# Patient Record
Sex: FEMALE | Race: WHITE | NOT HISPANIC OR LATINO | Employment: OTHER | URBAN - METROPOLITAN AREA
[De-identification: names, ages, dates, MRNs, and addresses within clinical notes are randomized per-mention and may not be internally consistent; named-entity substitution may affect disease eponyms.]

---

## 2022-09-01 ENCOUNTER — MEDICAL CORRESPONDENCE (OUTPATIENT)
Dept: HEALTH INFORMATION MANAGEMENT | Facility: CLINIC | Age: 82
End: 2022-09-01

## 2022-10-28 ENCOUNTER — HOSPITAL ENCOUNTER (EMERGENCY)
Facility: CLINIC | Age: 82
Discharge: NURSING, REHAB OR LONG-TERM CARE | End: 2022-10-28
Attending: EMERGENCY MEDICINE | Admitting: EMERGENCY MEDICINE
Payer: COMMERCIAL

## 2022-10-28 VITALS
OXYGEN SATURATION: 98 % | HEART RATE: 73 BPM | RESPIRATION RATE: 19 BRPM | DIASTOLIC BLOOD PRESSURE: 84 MMHG | TEMPERATURE: 98.8 F | SYSTOLIC BLOOD PRESSURE: 136 MMHG

## 2022-10-28 DIAGNOSIS — F02.818 DEMENTIA DUE TO MEDICAL CONDITION WITH BEHAVIORAL DISTURBANCE (H): ICD-10-CM

## 2022-10-28 DIAGNOSIS — R41.82 ALTERED MENTAL STATUS, UNSPECIFIED ALTERED MENTAL STATUS TYPE: ICD-10-CM

## 2022-10-28 LAB
ALBUMIN SERPL BCG-MCNC: 3.6 G/DL (ref 3.5–5.2)
ALBUMIN UR-MCNC: 30 MG/DL
ALP SERPL-CCNC: 78 U/L (ref 35–104)
ALT SERPL W P-5'-P-CCNC: 28 U/L (ref 10–35)
ANION GAP SERPL CALCULATED.3IONS-SCNC: 10 MMOL/L (ref 7–15)
APPEARANCE UR: CLEAR
AST SERPL W P-5'-P-CCNC: 27 U/L (ref 10–35)
BASOPHILS # BLD AUTO: 0 10E3/UL (ref 0–0.2)
BASOPHILS NFR BLD AUTO: 0 %
BILIRUB SERPL-MCNC: 0.4 MG/DL
BILIRUB UR QL STRIP: NEGATIVE
BUN SERPL-MCNC: 28 MG/DL (ref 8–23)
CALCIUM SERPL-MCNC: 10.4 MG/DL (ref 8.8–10.2)
CHLORIDE SERPL-SCNC: 107 MMOL/L (ref 98–107)
COLOR UR AUTO: YELLOW
CREAT SERPL-MCNC: 1 MG/DL (ref 0.51–0.95)
DEPRECATED HCO3 PLAS-SCNC: 24 MMOL/L (ref 22–29)
EOSINOPHIL # BLD AUTO: 0 10E3/UL (ref 0–0.7)
EOSINOPHIL NFR BLD AUTO: 0 %
ERYTHROCYTE [DISTWIDTH] IN BLOOD BY AUTOMATED COUNT: 12.5 % (ref 10–15)
GFR SERPL CREATININE-BSD FRML MDRD: 56 ML/MIN/1.73M2
GLUCOSE SERPL-MCNC: 112 MG/DL (ref 70–99)
GLUCOSE UR STRIP-MCNC: NEGATIVE MG/DL
HCT VFR BLD AUTO: 36.6 % (ref 35–47)
HGB BLD-MCNC: 12.1 G/DL (ref 11.7–15.7)
HGB UR QL STRIP: NEGATIVE
IMM GRANULOCYTES # BLD: 0 10E3/UL
IMM GRANULOCYTES NFR BLD: 0 %
KETONES UR STRIP-MCNC: ABNORMAL MG/DL
LEUKOCYTE ESTERASE UR QL STRIP: ABNORMAL
LYMPHOCYTES # BLD AUTO: 0.9 10E3/UL (ref 0.8–5.3)
LYMPHOCYTES NFR BLD AUTO: 7 %
MCH RBC QN AUTO: 29.4 PG (ref 26.5–33)
MCHC RBC AUTO-ENTMCNC: 33.1 G/DL (ref 31.5–36.5)
MCV RBC AUTO: 89 FL (ref 78–100)
MONOCYTES # BLD AUTO: 1.7 10E3/UL (ref 0–1.3)
MONOCYTES NFR BLD AUTO: 13 %
MUCOUS THREADS #/AREA URNS LPF: PRESENT /LPF
NEUTROPHILS # BLD AUTO: 10 10E3/UL (ref 1.6–8.3)
NEUTROPHILS NFR BLD AUTO: 80 %
NITRATE UR QL: NEGATIVE
NRBC # BLD AUTO: 0 10E3/UL
NRBC BLD AUTO-RTO: 0 /100
PH UR STRIP: 5 [PH] (ref 5–7)
PLATELET # BLD AUTO: 149 10E3/UL (ref 150–450)
POTASSIUM SERPL-SCNC: 4.5 MMOL/L (ref 3.4–5.3)
PROT SERPL-MCNC: 6.2 G/DL (ref 6.4–8.3)
RBC # BLD AUTO: 4.11 10E6/UL (ref 3.8–5.2)
RBC URINE: 2 /HPF
SODIUM SERPL-SCNC: 141 MMOL/L (ref 136–145)
SP GR UR STRIP: 1.03 (ref 1–1.03)
TROPONIN T SERPL HS-MCNC: 26 NG/L
UROBILINOGEN UR STRIP-MCNC: NORMAL MG/DL
WBC # BLD AUTO: 12.7 10E3/UL (ref 4–11)
WBC URINE: 5 /HPF

## 2022-10-28 PROCEDURE — 84484 ASSAY OF TROPONIN QUANT: CPT | Performed by: EMERGENCY MEDICINE

## 2022-10-28 PROCEDURE — 96361 HYDRATE IV INFUSION ADD-ON: CPT | Performed by: EMERGENCY MEDICINE

## 2022-10-28 PROCEDURE — 80053 COMPREHEN METABOLIC PANEL: CPT | Performed by: EMERGENCY MEDICINE

## 2022-10-28 PROCEDURE — 258N000003 HC RX IP 258 OP 636: Performed by: EMERGENCY MEDICINE

## 2022-10-28 PROCEDURE — 36415 COLL VENOUS BLD VENIPUNCTURE: CPT | Performed by: EMERGENCY MEDICINE

## 2022-10-28 PROCEDURE — 87086 URINE CULTURE/COLONY COUNT: CPT | Performed by: EMERGENCY MEDICINE

## 2022-10-28 PROCEDURE — 85025 COMPLETE CBC W/AUTO DIFF WBC: CPT | Performed by: EMERGENCY MEDICINE

## 2022-10-28 PROCEDURE — 99283 EMERGENCY DEPT VISIT LOW MDM: CPT | Performed by: EMERGENCY MEDICINE

## 2022-10-28 PROCEDURE — 99283 EMERGENCY DEPT VISIT LOW MDM: CPT | Mod: 25 | Performed by: EMERGENCY MEDICINE

## 2022-10-28 PROCEDURE — 96360 HYDRATION IV INFUSION INIT: CPT | Performed by: EMERGENCY MEDICINE

## 2022-10-28 PROCEDURE — 81001 URINALYSIS AUTO W/SCOPE: CPT | Performed by: EMERGENCY MEDICINE

## 2022-10-28 RX ADMIN — SODIUM CHLORIDE 500 ML: 9 INJECTION, SOLUTION INTRAVENOUS at 10:16

## 2022-10-28 ASSESSMENT — ACTIVITIES OF DAILY LIVING (ADL)
ADLS_ACUITY_SCORE: 35

## 2022-10-28 NOTE — ED NOTES
Received a call from LPN at care facility asking for a status update.  Informed him staff will call when have more information.  Callback number [phone].  Primary RN updated.
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
## 2022-10-28 NOTE — ED NOTES
Bed: [facility]  Expected date: 10/28/22  Expected time: 9:34 AM  Means of arrival: Ambulance  Comments:  Sue [id number]    83 y/o Female    Increased confusion    Triaged:  YELLOW

## 2022-10-28 NOTE — DISCHARGE INSTRUCTIONS
Push fluids and eat well  Await urine culture results  Primary care in 1 week  Return if worsening fevers, vomiting, discomfort.

## 2022-10-28 NOTE — ED TRIAGE NOTES
Kim was BIBA today from [facility] for evaluation of increased confusion.  She is mildly confused at baseline however the aid noticed this morning that she was taking off her clothes and repeating statements.  Patient is alert and calm.  She voiced no complaints at this time.       Triage Assessment     Row Name 10/28/22 0948       Triage Assessment (Adult)    Airway WDL WDL       Respiratory WDL    Respiratory WDL WDL       Skin Circulation/Temperature WDL    Skin Circulation/Temperature WDL WDL       Cardiac WDL    Cardiac WDL WDL       Peripheral/Neurovascular WDL    Peripheral Neurovascular WDL WDL       Cognitive/Neuro/Behavioral WDL    Cognitive/Neuro/Behavioral WDL X    Level of Consciousness confused    Orientation disoriented to;place;time       Abdoulaye Coma Scale    Best Eye Response 4-->(E4) spontaneous    Best Verbal Response 4-->(V4) confused

## 2022-10-30 LAB — BACTERIA UR CULT: NORMAL

## 2022-10-31 NOTE — RESULT ENCOUNTER NOTE
Final urine culture report is negative.  Adult Negative Urine culture parameters per protocol: Any # Urogenital single or mixed organism, <10,000 col/ml single organism (cath/midstream), and > 3 organisms (No susceptibilities performed).  [facility] Emergency Dept discharge antibiotic prescribed (If applicable): None  Treatment recommendations per [facility] ED Lab Result Urine Culture protocol.

## 2023-03-11 ENCOUNTER — APPOINTMENT (OUTPATIENT)
Dept: CT IMAGING | Facility: CLINIC | Age: 83
End: 2023-03-11
Attending: EMERGENCY MEDICINE
Payer: COMMERCIAL

## 2023-03-11 ENCOUNTER — APPOINTMENT (OUTPATIENT)
Dept: GENERAL RADIOLOGY | Facility: CLINIC | Age: 83
End: 2023-03-11
Attending: EMERGENCY MEDICINE
Payer: COMMERCIAL

## 2023-03-11 ENCOUNTER — HOSPITAL ENCOUNTER (OUTPATIENT)
Facility: CLINIC | Age: 83
Setting detail: OBSERVATION
Discharge: HOME | End: 2023-03-13
Attending: EMERGENCY MEDICINE | Admitting: PHYSICIAN ASSISTANT
Payer: COMMERCIAL

## 2023-03-11 DIAGNOSIS — E86.0 DEHYDRATION: ICD-10-CM

## 2023-03-11 DIAGNOSIS — N39.0 URINARY TRACT INFECTION WITHOUT HEMATURIA, SITE UNSPECIFIED: Primary | ICD-10-CM
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
## 2023-03-11 DIAGNOSIS — M62.81 GENERALIZED MUSCLE WEAKNESS: ICD-10-CM

## 2023-03-11 DIAGNOSIS — N17.9 ACUTE KIDNEY INJURY (H): ICD-10-CM

## 2023-03-11 LAB
ALBUMIN SERPL BCG-MCNC: 4 G/DL (ref 3.5–5.2)
ALBUMIN UR-MCNC: 20 MG/DL
ALP SERPL-CCNC: 88 U/L (ref 35–104)
ALT SERPL W P-5'-P-CCNC: 25 U/L (ref 10–35)
ANION GAP SERPL CALCULATED.3IONS-SCNC: 13 MMOL/L (ref 7–15)
APPEARANCE UR: ABNORMAL
AST SERPL W P-5'-P-CCNC: 28 U/L (ref 10–35)
BACTERIA #/AREA URNS HPF: ABNORMAL /HPF
BASOPHILS # BLD AUTO: 0.1 10E3/UL (ref 0–0.2)
BASOPHILS NFR BLD AUTO: 1 %
BILIRUB SERPL-MCNC: 0.2 MG/DL
BILIRUB UR QL STRIP: NEGATIVE
BUN SERPL-MCNC: 51.1 MG/DL (ref 8–23)
CALCIUM SERPL-MCNC: 10.1 MG/DL (ref 8.8–10.2)
CHLORIDE SERPL-SCNC: 103 MMOL/L (ref 98–107)
COLOR UR AUTO: YELLOW
CREAT SERPL-MCNC: 1.76 MG/DL (ref 0.51–0.95)
DEPRECATED HCO3 PLAS-SCNC: 22 MMOL/L (ref 22–29)
EOSINOPHIL # BLD AUTO: 0 10E3/UL (ref 0–0.7)
EOSINOPHIL NFR BLD AUTO: 0 %
ERYTHROCYTE [DISTWIDTH] IN BLOOD BY AUTOMATED COUNT: 13.5 % (ref 10–15)
FLUAV RNA SPEC QL NAA+PROBE: NEGATIVE
FLUBV RNA RESP QL NAA+PROBE: NEGATIVE
GFR SERPL CREATININE-BSD FRML MDRD: 29 ML/MIN/1.73M2
GLUCOSE SERPL-MCNC: 118 MG/DL (ref 70–99)
GLUCOSE UR STRIP-MCNC: NEGATIVE MG/DL
HCT VFR BLD AUTO: 40.7 % (ref 35–47)
HGB BLD-MCNC: 12.9 G/DL (ref 11.7–15.7)
HGB UR QL STRIP: NEGATIVE
HOLD SPECIMEN: NORMAL
HYALINE CASTS: 38 /LPF
IMM GRANULOCYTES # BLD: 0 10E3/UL
IMM GRANULOCYTES NFR BLD: 0 %
KETONES UR STRIP-MCNC: 10 MG/DL
LEUKOCYTE ESTERASE UR QL STRIP: ABNORMAL
LYMPHOCYTES # BLD AUTO: 1.4 10E3/UL (ref 0.8–5.3)
LYMPHOCYTES NFR BLD AUTO: 14 %
MAGNESIUM SERPL-MCNC: 2.4 MG/DL (ref 1.7–2.3)
MCH RBC QN AUTO: 28.7 PG (ref 26.5–33)
MCHC RBC AUTO-ENTMCNC: 31.7 G/DL (ref 31.5–36.5)
MCV RBC AUTO: 90 FL (ref 78–100)
MONOCYTES # BLD AUTO: 0.9 10E3/UL (ref 0–1.3)
MONOCYTES NFR BLD AUTO: 10 %
MUCOUS THREADS #/AREA URNS LPF: PRESENT /LPF
NEUTROPHILS # BLD AUTO: 7.3 10E3/UL (ref 1.6–8.3)
NEUTROPHILS NFR BLD AUTO: 75 %
NITRATE UR QL: NEGATIVE
NRBC # BLD AUTO: 0 10E3/UL
NRBC BLD AUTO-RTO: 0 /100
PH UR STRIP: 5 [PH] (ref 5–7)
PLATELET # BLD AUTO: 192 10E3/UL (ref 150–450)
POTASSIUM SERPL-SCNC: 4.6 MMOL/L (ref 3.4–5.3)
PROT SERPL-MCNC: 6.5 G/DL (ref 6.4–8.3)
RBC # BLD AUTO: 4.5 10E6/UL (ref 3.8–5.2)
RBC URINE: 2 /HPF
RSV RNA SPEC NAA+PROBE: NEGATIVE
SARS-COV-2 RNA RESP QL NAA+PROBE: NEGATIVE
SODIUM SERPL-SCNC: 138 MMOL/L (ref 136–145)
SP GR UR STRIP: 1.03 (ref 1–1.03)
SQUAMOUS EPITHELIAL: 1 /HPF
TRANSITIONAL EPI: 1 /HPF
TROPONIN T SERPL HS-MCNC: 38 NG/L
TROPONIN T SERPL HS-MCNC: 46 NG/L
UROBILINOGEN UR STRIP-MCNC: NORMAL MG/DL
WBC # BLD AUTO: 9.7 10E3/UL (ref 4–11)
WBC URINE: 17 /HPF

## 2023-03-11 PROCEDURE — 36415 COLL VENOUS BLD VENIPUNCTURE: CPT | Performed by: EMERGENCY MEDICINE

## 2023-03-11 PROCEDURE — 81001 URINALYSIS AUTO W/SCOPE: CPT | Performed by: EMERGENCY MEDICINE

## 2023-03-11 PROCEDURE — 87086 URINE CULTURE/COLONY COUNT: CPT | Performed by: EMERGENCY MEDICINE

## 2023-03-11 PROCEDURE — 72125 CT NECK SPINE W/O DYE: CPT | Mod: 26 | Performed by: RADIOLOGY

## 2023-03-11 PROCEDURE — 85004 AUTOMATED DIFF WBC COUNT: CPT | Performed by: EMERGENCY MEDICINE

## 2023-03-11 PROCEDURE — 250N000013 HC RX MED GY IP 250 OP 250 PS 637: Performed by: PHYSICIAN ASSISTANT

## 2023-03-11 PROCEDURE — 80053 COMPREHEN METABOLIC PANEL: CPT | Performed by: EMERGENCY MEDICINE

## 2023-03-11 PROCEDURE — 99285 EMERGENCY DEPT VISIT HI MDM: CPT | Mod: 25,CS | Performed by: EMERGENCY MEDICINE

## 2023-03-11 PROCEDURE — 250N000011 HC RX IP 250 OP 636: Performed by: EMERGENCY MEDICINE

## 2023-03-11 PROCEDURE — 93010 ELECTROCARDIOGRAM REPORT: CPT | Performed by: EMERGENCY MEDICINE

## 2023-03-11 PROCEDURE — G0378 HOSPITAL OBSERVATION PER HR: HCPCS

## 2023-03-11 PROCEDURE — 96361 HYDRATE IV INFUSION ADD-ON: CPT | Performed by: EMERGENCY MEDICINE

## 2023-03-11 PROCEDURE — 258N000003 HC RX IP 258 OP 636: Performed by: EMERGENCY MEDICINE

## 2023-03-11 PROCEDURE — 71046 X-RAY EXAM CHEST 2 VIEWS: CPT

## 2023-03-11 PROCEDURE — 87637 SARSCOV2&INF A&B&RSV AMP PRB: CPT | Performed by: EMERGENCY MEDICINE

## 2023-03-11 PROCEDURE — 70450 CT HEAD/BRAIN W/O DYE: CPT | Mod: 26 | Performed by: RADIOLOGY

## 2023-03-11 PROCEDURE — 99285 EMERGENCY DEPT VISIT HI MDM: CPT | Mod: 25 | Performed by: EMERGENCY MEDICINE

## 2023-03-11 PROCEDURE — 83735 ASSAY OF MAGNESIUM: CPT | Performed by: EMERGENCY MEDICINE

## 2023-03-11 PROCEDURE — 96365 THER/PROPH/DIAG IV INF INIT: CPT | Performed by: EMERGENCY MEDICINE

## 2023-03-11 PROCEDURE — 71046 X-RAY EXAM CHEST 2 VIEWS: CPT | Mod: 26 | Performed by: RADIOLOGY

## 2023-03-11 PROCEDURE — C9803 HOPD COVID-19 SPEC COLLECT: HCPCS | Performed by: EMERGENCY MEDICINE

## 2023-03-11 PROCEDURE — 84484 ASSAY OF TROPONIN QUANT: CPT | Performed by: EMERGENCY MEDICINE

## 2023-03-11 PROCEDURE — 93005 ELECTROCARDIOGRAM TRACING: CPT | Performed by: EMERGENCY MEDICINE

## 2023-03-11 PROCEDURE — 70450 CT HEAD/BRAIN W/O DYE: CPT

## 2023-03-11 PROCEDURE — 72125 CT NECK SPINE W/O DYE: CPT

## 2023-03-11 RX ORDER — LOSARTAN POTASSIUM 50 MG/1
50 TABLET ORAL DAILY
Status: ON HOLD | COMMUNITY
Start: 2022-08-24 | End: 2023-06-12

## 2023-03-11 RX ORDER — SODIUM CHLORIDE 9 MG/ML
INJECTION, SOLUTION INTRAVENOUS CONTINUOUS
Status: DISCONTINUED | OUTPATIENT
Start: 2023-03-11 | End: 2023-03-11

## 2023-03-11 RX ORDER — SIMVASTATIN 20 MG
40 TABLET ORAL AT BEDTIME
Status: DISCONTINUED | OUTPATIENT
Start: 2023-03-11 | End: 2023-03-13 | Stop reason: HOSPADM

## 2023-03-11 RX ORDER — LOSARTAN POTASSIUM 25 MG/1
50 TABLET ORAL DAILY
Status: DISCONTINUED | OUTPATIENT
Start: 2023-03-12 | End: 2023-03-13 | Stop reason: HOSPADM

## 2023-03-11 RX ORDER — MULTIPLE VITAMINS W/ MINERALS TAB 9MG-400MCG
1 TAB ORAL DAILY
Status: ON HOLD | COMMUNITY
End: 2023-06-12

## 2023-03-11 RX ORDER — SODIUM CHLORIDE 9 MG/ML
INJECTION, SOLUTION INTRAVENOUS CONTINUOUS
Status: DISCONTINUED | OUTPATIENT
Start: 2023-03-11 | End: 2023-03-13 | Stop reason: HOSPADM

## 2023-03-11 RX ORDER — CALCIUM POLYCARBOPHIL 625 MG
625 TABLET ORAL DAILY
Status: ON HOLD | COMMUNITY
Start: 2022-11-30 | End: 2023-06-12

## 2023-03-11 RX ORDER — SIMVASTATIN 40 MG
40 TABLET ORAL DAILY
Status: ON HOLD | COMMUNITY
Start: 2021-12-15 | End: 2023-06-12

## 2023-03-11 RX ORDER — CALCIUM POLYCARBOPHIL 625 MG 625 MG/1
625 TABLET ORAL DAILY
Status: DISCONTINUED | OUTPATIENT
Start: 2023-03-12 | End: 2023-03-13 | Stop reason: HOSPADM

## 2023-03-11 RX ORDER — ACETAMINOPHEN 500 MG
1000 TABLET ORAL 2 TIMES DAILY
Status: DISCONTINUED | OUTPATIENT
Start: 2023-03-11 | End: 2023-03-13 | Stop reason: HOSPADM

## 2023-03-11 RX ORDER — LISINOPRIL 5 MG/1
5 TABLET ORAL
COMMUNITY
Start: 2021-03-23 | End: 2023-03-11

## 2023-03-11 RX ORDER — CEFTRIAXONE 1 G/1
1 INJECTION, POWDER, FOR SOLUTION INTRAMUSCULAR; INTRAVENOUS EVERY 24 HOURS
Status: DISCONTINUED | OUTPATIENT
Start: 2023-03-11 | End: 2023-03-13

## 2023-03-11 RX ADMIN — SODIUM CHLORIDE: 9 INJECTION, SOLUTION INTRAVENOUS at 22:09

## 2023-03-11 RX ADMIN — SODIUM CHLORIDE 1000 ML: 9 INJECTION, SOLUTION INTRAVENOUS at 17:58

## 2023-03-11 RX ADMIN — SODIUM CHLORIDE: 9 INJECTION, SOLUTION INTRAVENOUS at 19:24

## 2023-03-11 RX ADMIN — CEFTRIAXONE SODIUM 1 G: 1 INJECTION, POWDER, FOR SOLUTION INTRAMUSCULAR; INTRAVENOUS at 19:24

## 2023-03-11 RX ADMIN — SODIUM CHLORIDE 1000 ML: 9 INJECTION, SOLUTION INTRAVENOUS at 20:40

## 2023-03-11 RX ADMIN — ACETAMINOPHEN 1000 MG: 500 TABLET ORAL at 22:13

## 2023-03-11 RX ADMIN — SIMVASTATIN 40 MG: 40 TABLET, FILM COATED ORAL at 22:13

## 2023-03-11 ASSESSMENT — ACTIVITIES OF DAILY LIVING (ADL)
ADLS_ACUITY_SCORE: 35

## 2023-03-11 NOTE — DISCHARGE INSTRUCTIONS
TODAY'S VISIT:  You were seen today for fall  -   - If you had any labs or imaging/radiology tests performed today, you should also discuss these tests with your usual provider.     FOLLOW-UP:  Please make an appointment to follow up with:  - Your Primary Care Provider. If you do not have a PCP, please call the Primary Care Center (phone: [phone] for an appointment    - Have your provider review the results from today's visit with you again to make sure no further follow-up or additional testing is needed based on those results.     RETURN TO THE EMERGENCY DEPARTMENT  Return to the Emergency Department at any time for any new or worsening symptoms or any concerns.

## 2023-03-11 NOTE — ED PROVIDER NOTES
History     Chief Complaint   Patient presents with     Fall     HPI  Kim Ybarra is a 82 year old female with a past medical history of dementia, UTI, dehydration who presents to the emergency department with a chief complaint of fall.  The patient was brought in by private ambulance from her assisted living facility after being found on the floor.  Patient unable to report what happened.  The patient normally ambulates with a walker.  No definite injury or head trauma.  However, the patient was noted to be more weak and with slightly altered mental status compared to her baseline.  She is not on blood thinners.  No focal deficits noted.  Blood sugar 143 per EMS.  The patient denies any pain at this time.    History from the patient is limited due to her history of dementia, however, patient's daughter is also present in the emergency department and provides additional history at this time.  She reports that this is the third time the patient has been seen in the emergency department with similar symptoms.  She reports that in the past she has been found to be dehydrated or have a urinary tract infection.  She states that other than these episodes, the patient has generally been doing well at her assisted living facility.    I have reviewed the Medications, Allergies, Past Medical and Surgical History, and Social History in the Epic system.    No past medical history on file.  No past surgical history on file.  No current facility-administered medications for this encounter.     No current outpatient medications on file.     Allergies   Allergen Reactions     Other Food Allergy Unknown     Beef     Past medical history, past surgical history, medications, and allergies were reviewed with the patient. Additional pertinent items: None    Social History     Socioeconomic History     Marital status: Single     Spouse name: Not on file     Number of children: Not on file     Years of education: Not on file     Highest  education level: Not on file   Occupational History     Not on file   Tobacco Use     Smoking status: Never     Smokeless tobacco: Never   Substance and Sexual Activity     Alcohol use: Not Currently     Drug use: Never     Sexual activity: Not on file   Other Topics Concern     Not on file   Social History Narrative     Not on file     Social Determinants of Health     Financial Resource Strain: Not on file   Food Insecurity: Not on file   Transportation Needs: Not on file   Physical Activity: Not on file   Stress: Not on file   Social Connections: Not on file   Intimate Partner Violence: Not on file   Housing Stability: Not on file     Social history was reviewed with the patient. Additional pertinent items: None    Review of Systems  A medically appropriate review of systems was performed with pertinent positives and negatives noted in the HPI, and all other systems negative.    Physical Exam   BP: 126/70  Pulse: 94  Temp: 98.4  F (36.9  C)  Resp: 17  SpO2: 96 %      General: Well nourished, well developed, NAD  HEENT: EOMI, anicteric. NCAT, MMM  Neck: no jugular venous distension, supple, nl ROM, no midline tenderness or step-off  Cardiac: Regular rate and rhythm. No murmurs, rubs, or gallops. Normal S1, S2.  Intact peripheral pulses  Pulm: CTAB, no stridor, wheezes, rales, rhonchi  Abd: Soft, nontender, nondistended.  No masses palpated.    Skin: Warm and dry to the touch.  No rash  Extremities: No LE edema, no cyanosis, w/w/p  Neuro: Alert, oriented at baseline, moving all extremities    ED Course        Procedures                 ED Course Selections:        EKG Interpretation:      Interpreted by Cheyenne Lara MD  Time reviewed: 1645  Symptoms at time of EKG: weakness   Rhythm: normal sinus   Rate: normal, 87 bpm   Axis: normal  Ectopy: none  Conduction: normal  ST Segments/ T Waves: No ST-T wave changes  Q Waves: septal  Comparison to prior: No old EKG available    Clinical Impression: abnormal  EKG                Labs Ordered and Resulted from Time of ED Arrival to Time of ED Departure   COMPREHENSIVE METABOLIC PANEL - Abnormal       Result Value    Sodium 138      Potassium 4.6      Chloride 103      Carbon Dioxide (CO2) 22      Anion Gap 13      Urea Nitrogen 51.1 (*)     Creatinine 1.76 (*)     Calcium 10.1      Glucose 118 (*)     Alkaline Phosphatase 88      AST 28      ALT 25      Protein Total 6.5      Albumin 4.0      Bilirubin Total 0.2      GFR Estimate 29 (*)    ROUTINE UA WITH MICROSCOPIC REFLEX TO CULTURE - Abnormal    Color Urine Yellow      Appearance Urine Slightly Cloudy (*)     Glucose Urine Negative      Bilirubin Urine Negative      Ketones Urine 10 (*)     Specific Gravity Urine 1.026      Blood Urine Negative      pH Urine 5.0      Protein Albumin Urine 20 (*)     Urobilinogen Urine Normal      Nitrite Urine Negative      Leukocyte Esterase Urine Moderate (*)     Bacteria Urine Moderate (*)     Mucus Urine Present (*)     RBC Urine 2      WBC Urine 17 (*)     Squamous Epithelials Urine 1      Transitional Epithelials Urine 1      Hyaline Casts Urine 38 (*)    MAGNESIUM - Abnormal    Magnesium 2.4 (*)    TROPONIN T, HIGH SENSITIVITY - Abnormal    Troponin T, High Sensitivity 46 (*)    TROPONIN T, HIGH SENSITIVITY - Abnormal    Troponin T, High Sensitivity 38 (*)    INFLUENZA A/B, RSV, & SARS-COV2 PCR - Normal    Influenza A PCR Negative      Influenza B PCR Negative      RSV PCR Negative      SARS CoV2 PCR Negative     CBC WITH PLATELETS AND DIFFERENTIAL    WBC Count 9.7      RBC Count 4.50      Hemoglobin 12.9      Hematocrit 40.7      MCV 90      MCH 28.7      MCHC 31.7      RDW 13.5      Platelet Count 192      % Neutrophils 75      % Lymphocytes 14      % Monocytes 10      % Eosinophils 0      % Basophils 1      % Immature Granulocytes 0      NRBCs per 100 WBC 0      Absolute Neutrophils 7.3      Absolute Lymphocytes 1.4      Absolute Monocytes 0.9      Absolute Eosinophils 0.0       Absolute Basophils 0.1      Absolute Immature Granulocytes 0.0      Absolute NRBCs 0.0     URINE CULTURE            Results for orders placed or performed during the hospital encounter of 03/11/23 (from the past 24 hour(s))   [facility] Draw    Narrative    The following orders were created for panel order [facility] Draw.  Procedure                               Abnormality         Status                     ---------                               -----------         ------                     Extra Blue Top Tube[[id number]]                              Final result               Extra Red Top Tube[[id number]]                               Final result               Extra Green Top (Lithium...[[id number]]                      Final result               Extra Purple Top Tube[[id number]]                            Final result                 Please view results for these tests on the individual orders.   Extra Purple Top Tube   Result Value Ref Range    Hold Specimen JI    CBC with platelets differential    Narrative    The following orders were created for panel order CBC with platelets differential.  Procedure                               Abnormality         Status                     ---------                               -----------         ------                     CBC with platelets and d...[[id number]]                      Final result                 Please view results for these tests on the individual orders.   CBC with platelets and differential   Result Value Ref Range    WBC Count 9.7 4.0 - 11.0 10e3/uL    RBC Count 4.50 3.80 - 5.20 10e6/uL    Hemoglobin 12.9 11.7 - 15.7 g/dL    Hematocrit 40.7 35.0 - 47.0 %    MCV 90 78 - 100 fL    MCH 28.7 26.5 - 33.0 pg    MCHC 31.7 31.5 - 36.5 g/dL    RDW 13.5 10.0 - 15.0 %    Platelet Count 192 150 - 450 10e3/uL    % Neutrophils 75 %    % Lymphocytes 14 %    % Monocytes 10 %    % Eosinophils 0 %    % Basophils 1 %    % Immature Granulocytes 0 %    NRBCs per 100 WBC 0  <1 /100    Absolute Neutrophils 7.3 1.6 - 8.3 10e3/uL    Absolute Lymphocytes 1.4 0.8 - 5.3 10e3/uL    Absolute Monocytes 0.9 0.0 - 1.3 10e3/uL    Absolute Eosinophils 0.0 0.0 - 0.7 10e3/uL    Absolute Basophils 0.1 0.0 - 0.2 10e3/uL    Absolute Immature Granulocytes 0.0 <=0.4 10e3/uL    Absolute NRBCs 0.0 10e3/uL   Extra Blue Top Tube   Result Value Ref Range    Hold Specimen JIC    Extra Red Top Tube   Result Value Ref Range    Hold Specimen JIC    Extra Green Top (Lithium Heparin) Tube   Result Value Ref Range    Hold Specimen JIC    Comprehensive metabolic panel   Result Value Ref Range    Sodium 138 136 - 145 mmol/L    Potassium 4.6 3.4 - 5.3 mmol/L    Chloride 103 98 - 107 mmol/L    Carbon Dioxide (CO2) 22 22 - 29 mmol/L    Anion Gap 13 7 - 15 mmol/L    Urea Nitrogen 51.1 (H) 8.0 - 23.0 mg/dL    Creatinine 1.76 (H) 0.51 - 0.95 mg/dL    Calcium 10.1 8.8 - 10.2 mg/dL    Glucose 118 (H) 70 - 99 mg/dL    Alkaline Phosphatase 88 35 - 104 U/L    AST 28 10 - 35 U/L    ALT 25 10 - 35 U/L    Protein Total 6.5 6.4 - 8.3 g/dL    Albumin 4.0 3.5 - 5.2 g/dL    Bilirubin Total 0.2 <=1.2 mg/dL    GFR Estimate 29 (L) >60 mL/min/1.73m2   Magnesium   Result Value Ref Range    Magnesium 2.4 (H) 1.7 - 2.3 mg/dL   Troponin T, High Sensitivity   Result Value Ref Range    Troponin T, High Sensitivity 46 (H) <=14 ng/L   EKG 12-lead, tracing only   Result Value Ref Range    Systolic Blood Pressure  mmHg    Diastolic Blood Pressure  mmHg    Ventricular Rate 87 BPM    Atrial Rate 87 BPM    TX Interval 140 ms    QRS Duration 82 ms     ms    QTc 433 ms    P Axis 51 degrees    R AXIS 22 degrees    T Axis 48 degrees    Interpretation ECG       Sinus rhythm  Septal infarct , age undetermined  Abnormal ECG     CT Head w/o Contrast    Narrative    EXAM: CT HEAD W/O CONTRAST  3/11/2023 5:17 PM     HISTORY:  fall       COMPARISON:  None available.    TECHNIQUE: Using multidetector thin collimation helical acquisition  technique, axial,  coronal and sagittal CT images from the skull base  to the vertex were obtained without intravenous contrast.   (topogram) image(s) also obtained and reviewed.    FINDINGS:  No intracranial hemorrhage, mass effect, or midline shift. No acute  loss of gray-white matter differentiation in the cerebral hemispheres.  Ventricles are proportionate to the cerebral sulci. Clear basal  cisterns. 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     The bony calvaria and the bones of the skull base are normal. The  visualized portions of the paranasal sinuses and mastoid air cells are  clear. Grossly normal orbits. Bilateral pseudophakia. Vascular  calcifications.      Impression    IMPRESSION:   1. No acute intracranial pathology.   2. 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    I have personally reviewed the examination and initial interpretation  and I agree with the findings.    MEGHANN FAJARDO MD         SYSTEM ID:  [id number]   CT Cervical Spine w/o Contrast    Narrative    EXAM: CT CERVICAL SPINE W/O CONTRAST  3/11/2023 5:18 PM     HISTORY:  fall       COMPARISON:  None available.    TECHNIQUE: Using multidetector thin collimation helical acquisition  technique, axial, coronal and sagittal CT images through the cervical  spine were obtained without intravenous contrast.    FINDINGS:  Trace anterolisthesis of C3 on C4. No acute fracture or traumatic  subluxation. Multilevel disc space narrowing, which is most notable at  C4-5, C5-6 and C6-7. Ankylosis of the right C3-4 facets. Disc  osteophyte complex at C4-5 and C5-6 likely resulting in mild spinal  canal stenosis at C4-5. Moderate right neural foraminal narrowing at  C3-4, otherwise no high-grade neural foraminal narrowing. No  prevertebral edema. No abnormality of the paraspinous soft tissues.  Atrophic appearance of the right thyroid lobe.      Impression    IMPRESSION:  1. No acute fracture or traumatic subluxation.  2. Multilevel cervical spondylosis, which is greatest at C4-5,  C5-6  and C6-7. No high-grade spinal canal or neural foraminal narrowing.    I have personally reviewed the examination and initial interpretation  and I agree with the findings.    MEGHANN FAJARDO MD         SYSTEM ID:  [id number]   Symptomatic Influenza A/B, RSV, & SARS-CoV2 PCR (COVID-19) Nasopharyngeal    Specimen: Nasopharyngeal; Swab   Result Value Ref Range    Influenza A PCR Negative Negative    Influenza B PCR Negative Negative    RSV PCR Negative Negative    SARS CoV2 PCR Negative Negative    Narrative    Testing was performed using the Xpert Xpress CoV2/Flu/RSV Assay on the Cepheid GeneXpert Instrument. This test should be ordered for the detection of SARS-CoV-2, influenza, and RSV viruses in individuals who meet clinical and/or epidemiological criteria. Test performance is unknown in asymptomatic patients. This test is for in vitro diagnostic use under the FDA EUA for laboratories certified under CLIA to perform high or moderate complexity testing. This test has not been FDA cleared or approved. A negative result does not rule out the presence of PCR inhibitors in the specimen or target RNA in concentration below the limit of detection for the assay. If only one viral target is positive but coinfection with multiple targets is suspected, the sample should be re-tested with another FDA cleared, approved, or authorized test, if coinfection would change clinical management. This test was validated by the [facility] indoo.rs. These laboratories are certified under the Clinical Laboratory Improvement Amendments of 1988 (CLIA-88) as qualified to perform high complexity laboratory testing.   XR Chest 2 Views    Narrative    EXAM: XR CHEST 2 VIEWS  3/11/2023 5:23 PM      HISTORY: fall, weakness    COMPARISON: None available.    FINDINGS: Two views of the chest. No significant tracheal deviation.  Normal cardiomediastinal silhouette. No pleural effusion,  consolidation or pneumothorax. No acute osseous  abnormalities.  Atherosclerotic calcification of the aortic knob. Scoliotic curvature  of the thoracic spine.      Impression    IMPRESSION: No focal pulmonary opacity.    I have personally reviewed the examination and initial interpretation  and I agree with the findings.    DERRICK RAMOS MD         SYSTEM ID:  [id number]   UA with Microscopic reflex to Culture    Specimen: Urine, Midstream   Result Value Ref Range    Color Urine Yellow Colorless, Straw, Light Yellow, Yellow    Appearance Urine Slightly Cloudy (A) Clear    Glucose Urine Negative Negative mg/dL    Bilirubin Urine Negative Negative    Ketones Urine 10 (A) Negative mg/dL    Specific Gravity Urine 1.026 1.003 - 1.035    Blood Urine Negative Negative    pH Urine 5.0 5.0 - 7.0    Protein Albumin Urine 20 (A) Negative mg/dL    Urobilinogen Urine Normal Normal, 2.0 mg/dL    Nitrite Urine Negative Negative    Leukocyte Esterase Urine Moderate (A) Negative    Bacteria Urine Moderate (A) None Seen /HPF    Mucus Urine Present (A) None Seen /LPF    RBC Urine 2 <=2 /HPF    WBC Urine 17 (H) <=5 /HPF    Squamous Epithelials Urine 1 <=1 /HPF    Transitional Epithelials Urine 1 <=1 /HPF    Hyaline Casts Urine 38 (H) <=2 /LPF    Narrative    Urine Culture ordered based on laboratory criteria   Troponin T, High Sensitivity   Result Value Ref Range    Troponin T, High Sensitivity 38 (H) <=14 ng/L       Labs, vital signs, and imaging studies were reviewed by me.    Medications   0.9% sodium chloride BOLUS (0 mLs Intravenous Stopped 3/11/23 1905)     Followed by   sodium chloride 0.9% infusion (0 mLs Intravenous Stopped 3/11/23 2040)   cefTRIAXone (ROCEPHIN) 1 g vial to attach to  mL bag for ADULTS or NS 50 mL bag for PEDS (1 g Intravenous $New Bag 3/11/23 1924)   0.9% sodium chloride BOLUS (1,000 mLs Intravenous $New Bag 3/11/23 2040)     Followed by   sodium chloride 0.9% infusion (has no administration in time range)       Assessments & Plan (with Medical  Decision Making)   Kim Ybarra is a 82 year old female who presents to the emergency department with generalized weakness and altered mental status after a fall.  No obvious traumatic injuries on exam, however, given unclear circumstances of the fall, CT head, CT C-spine, and chest x-ray were ordered to rule out acute traumatic injury.  Differential diagnosis for patient's AMS/weakness would include ICH, CVA, electrolyte abnormality, underlying infectious process (UTI, COVID, flu, pneumonia, etc.), dehydration, ACS.  Labs, EKG, chest x-ray ordered to further evaluate the patient.  IV fluids were ordered.    EKG shows normal sinus rhythm    Laboratory work-up is remarkable for elevated creatinine compared to patient's baseline (1.76 today compared to 1.04 months ago), consistent with dehydration.  IV fluids were given.    Patient's troponin level was also slightly elevated at 46, stable at 38 on 2-hour recheck.  It does appear patient's troponin has been elevated in the past (4 months ago it was 26)    Urinalysis is consistent with UTI.  Antibiotics were ordered.  Most recent urine culture on 10/28/2022 showed urogenital magi only.    COVID-19/influenza/RSV testing is negative.    Option of admission to ED observation unit for further laboratory monitoring and IV fluids/antibiotic administration versus discharge home with close follow-up was discussed with the patient and her daughter.  Patient's daughter is concerned that she will have difficulty arranging outpatient follow-up for the patient as the physician who visits her care facility comes on a schedule of every 3 months and was just at their facility.  She does not think that there is a way to recheck labs at her facility and she is uncertain if she would be able to get her to an appointment/lab draw outside of her care facility within the next week or so.  We will plan to admit patient to ED observation unit for further monitoring and to ensure that patient's  creatinine improves with IV hydration.    Critical care was not performed.     Medical Decision Making  The patient's presentation was of moderate complexity (a chronic illness mild to moderate exacerbation, progression, or side effect of treatment).    The patient's evaluation involved:  an assessment requiring an independent historian (pt's daughter)  ordering and/or review of 3+ test(s) in this encounter (see separate area of note for details)  review of 3+ test result(s) ordered prior to this encounter (prior labs for comparison)  independent interpretation of testing performed by another health professional (CTs, CXR)  discussion of management or test interpretation with another health professional (see separate area of note for details)    The patient's management necessitated moderate risk (prescription drug management including medications given in the ED) and high risk (a decision regarding hospitalization)      I have reviewed the nursing notes.    I have reviewed the findings, diagnosis, plan and need for follow up with the patient.    Patient discussed with ED observation unit, to be admitted to their service for further management. Plan was discussed with patient who understands and agrees with plan.    New Prescriptions    No medications on file       Final diagnoses:   Fall at nursing home, initial encounter   Acute kidney injury (H)   Dehydration   Generalized muscle weakness       Cheyenne Lara MD  3/11/2023   [facility] EMERGENCY DEPARTMENT     Cheyenne Lara MD  03/11/23 2055

## 2023-03-11 NOTE — ED TRIAGE NOTES
travisa from NH for fall.  Normally ambulates with FWW, aides found her this morning on the ground. Unsure of head strike. More altered and weak. Not on thinners  No focal deficits  Denies pain.     VSS  Dementia at baseline     Triage Assessment     Row Name 03/11/23 1522       Triage Assessment (Adult)    Airway WDL WDL       Respiratory WDL    Respiratory WDL WDL       Skin Circulation/Temperature WDL    Skin Circulation/Temperature WDL WDL       Cardiac WDL    Cardiac WDL WDL       Peripheral/Neurovascular WDL    Peripheral Neurovascular WDL WDL       Cognitive/Neuro/Behavioral WDL    Cognitive/Neuro/Behavioral WDL X    Level of Consciousness confused    Orientation disoriented to;place;time;situation       Pupils (CN II)    Pupil Size Left 2 mm    Pupil Size Right 2 mm       [place] Coma Scale    Best Eye Response 4-->(E4) spontaneous    Best Motor Response 6-->(M6) obeys commands    Best Verbal Response 5-->(V5) oriented    [place] Coma Scale Score 15

## 2023-03-11 NOTE — LETTER
[facility] [address] OBSERVATION [address] [address]  [phone]    FACSIMILE TRANSMITTAL SHEET    TO: Admissions/Nursing  COMPANY: [facility]  FAX NUMBER: [phone]  PHONE NUMBER: [phone]     FROM: SHAHRZAD Prakash, MONIQUE  ED/OBS   [facility]  PHONE: [phone]  FAX: [phone]  DATE: 3/13/2023     _____URGENT _____REVIEW ONLY _____PLEASE COMMENT____PLEASE REPLY    NOTES/COMMENTS: Discharge orders for [facility] ( 1940)                              IF YOU DID NOT RECEIVE THE CORRECT NUMBER OF PAGES OR THE FAX DID NOT COME THROUGH CLEARLY, PLEASE CALL THE SENDER     CONFIDENTIALITY STATEMENT: Confidential information that may accompany this transmission contains protected health information under state and federal law and is legally privileged. This information is intended only for the use of the individual or entity named above and may be used only for carrying out treatment, payment or other healthcare operations. The recipient or person responsible for delivering this information is prohibited by law from disclosing this information without proper authorization to any other party, unless required to do so by law or regulation. If you are not the intended recipient, you are hereby notified that any review, dissemination, distribution, or copying of this message is strictly prohibited. If you have received this communication in error, please destroy the materials and contact us immediately by calling the number listed above. No response indicates that the information was received by the appropriate authorized party

## 2023-03-12 ENCOUNTER — APPOINTMENT (OUTPATIENT)
Dept: GENERAL RADIOLOGY | Facility: CLINIC | Age: 83
End: 2023-03-12
Attending: NURSE PRACTITIONER
Payer: COMMERCIAL

## 2023-03-12 LAB
ALBUMIN SERPL BCG-MCNC: 3.1 G/DL (ref 3.5–5.2)
ALP SERPL-CCNC: 69 U/L (ref 35–104)
ALT SERPL W P-5'-P-CCNC: 16 U/L (ref 10–35)
ANION GAP SERPL CALCULATED.3IONS-SCNC: 7 MMOL/L (ref 7–15)
AST SERPL W P-5'-P-CCNC: 20 U/L (ref 10–35)
BASOPHILS # BLD AUTO: 0.1 10E3/UL (ref 0–0.2)
BASOPHILS NFR BLD AUTO: 1 %
BILIRUB SERPL-MCNC: 0.3 MG/DL
BUN SERPL-MCNC: 30.3 MG/DL (ref 8–23)
CALCIUM SERPL-MCNC: 8.6 MG/DL (ref 8.8–10.2)
CHLORIDE SERPL-SCNC: 115 MMOL/L (ref 98–107)
CREAT SERPL-MCNC: 0.95 MG/DL (ref 0.51–0.95)
CRP SERPL-MCNC: <3 MG/L
CRP SERPL-MCNC: <3 MG/L
DEPRECATED HCO3 PLAS-SCNC: 19 MMOL/L (ref 22–29)
EOSINOPHIL # BLD AUTO: 0.1 10E3/UL (ref 0–0.7)
EOSINOPHIL NFR BLD AUTO: 2 %
ERYTHROCYTE [DISTWIDTH] IN BLOOD BY AUTOMATED COUNT: 13.5 % (ref 10–15)
GFR SERPL CREATININE-BSD FRML MDRD: 60 ML/MIN/1.73M2
GLUCOSE SERPL-MCNC: 87 MG/DL (ref 70–99)
HCT VFR BLD AUTO: 36.1 % (ref 35–47)
HGB BLD-MCNC: 11.1 G/DL (ref 11.7–15.7)
IMM GRANULOCYTES # BLD: 0 10E3/UL
IMM GRANULOCYTES NFR BLD: 0 %
LYMPHOCYTES # BLD AUTO: 1.4 10E3/UL (ref 0.8–5.3)
LYMPHOCYTES NFR BLD AUTO: 27 %
MCH RBC QN AUTO: 28.5 PG (ref 26.5–33)
MCHC RBC AUTO-ENTMCNC: 30.7 G/DL (ref 31.5–36.5)
MCV RBC AUTO: 93 FL (ref 78–100)
MONOCYTES # BLD AUTO: 0.6 10E3/UL (ref 0–1.3)
MONOCYTES NFR BLD AUTO: 12 %
NEUTROPHILS # BLD AUTO: 3.1 10E3/UL (ref 1.6–8.3)
NEUTROPHILS NFR BLD AUTO: 58 %
NRBC # BLD AUTO: 0 10E3/UL
NRBC BLD AUTO-RTO: 0 /100
PLATELET # BLD AUTO: 142 10E3/UL (ref 150–450)
POTASSIUM SERPL-SCNC: 4.4 MMOL/L (ref 3.4–5.3)
PROT SERPL-MCNC: 5.1 G/DL (ref 6.4–8.3)
RBC # BLD AUTO: 3.9 10E6/UL (ref 3.8–5.2)
SODIUM SERPL-SCNC: 141 MMOL/L (ref 136–145)
TROPONIN T SERPL HS-MCNC: 41 NG/L
WBC # BLD AUTO: 5.3 10E3/UL (ref 4–11)

## 2023-03-12 PROCEDURE — 36415 COLL VENOUS BLD VENIPUNCTURE: CPT | Performed by: PHYSICIAN ASSISTANT

## 2023-03-12 PROCEDURE — 73560 X-RAY EXAM OF KNEE 1 OR 2: CPT | Mod: 50

## 2023-03-12 PROCEDURE — 250N000011 HC RX IP 250 OP 636: Performed by: EMERGENCY MEDICINE

## 2023-03-12 PROCEDURE — 73560 X-RAY EXAM OF KNEE 1 OR 2: CPT | Mod: 26 | Performed by: RADIOLOGY

## 2023-03-12 PROCEDURE — 84484 ASSAY OF TROPONIN QUANT: CPT | Performed by: PHYSICIAN ASSISTANT

## 2023-03-12 PROCEDURE — 250N000013 HC RX MED GY IP 250 OP 250 PS 637: Performed by: PHYSICIAN ASSISTANT

## 2023-03-12 PROCEDURE — 86140 C-REACTIVE PROTEIN: CPT | Performed by: PHYSICIAN ASSISTANT

## 2023-03-12 PROCEDURE — G0378 HOSPITAL OBSERVATION PER HR: HCPCS

## 2023-03-12 PROCEDURE — 258N000003 HC RX IP 258 OP 636: Performed by: EMERGENCY MEDICINE

## 2023-03-12 PROCEDURE — 80053 COMPREHEN METABOLIC PANEL: CPT | Performed by: PHYSICIAN ASSISTANT

## 2023-03-12 PROCEDURE — 96376 TX/PRO/DX INJ SAME DRUG ADON: CPT

## 2023-03-12 PROCEDURE — 93005 ELECTROCARDIOGRAM TRACING: CPT

## 2023-03-12 PROCEDURE — 85004 AUTOMATED DIFF WBC COUNT: CPT | Performed by: PHYSICIAN ASSISTANT

## 2023-03-12 PROCEDURE — 93010 ELECTROCARDIOGRAM REPORT: CPT | Performed by: INTERNAL MEDICINE

## 2023-03-12 RX ORDER — ONDANSETRON 2 MG/ML
4 INJECTION INTRAMUSCULAR; INTRAVENOUS EVERY 6 HOURS PRN
Status: DISCONTINUED | OUTPATIENT
Start: 2023-03-12 | End: 2023-03-13 | Stop reason: HOSPADM

## 2023-03-12 RX ORDER — ONDANSETRON 4 MG/1
4 TABLET, ORALLY DISINTEGRATING ORAL EVERY 6 HOURS PRN
Status: DISCONTINUED | OUTPATIENT
Start: 2023-03-12 | End: 2023-03-13 | Stop reason: HOSPADM

## 2023-03-12 RX ADMIN — SODIUM CHLORIDE: 9 INJECTION, SOLUTION INTRAVENOUS at 01:50

## 2023-03-12 RX ADMIN — CALCIUM POLYCARBOPHIL 625 MG: 625 TABLET, FILM COATED ORAL at 08:15

## 2023-03-12 RX ADMIN — SIMVASTATIN 40 MG: 40 TABLET, FILM COATED ORAL at 22:10

## 2023-03-12 RX ADMIN — SODIUM CHLORIDE: 9 INJECTION, SOLUTION INTRAVENOUS at 10:44

## 2023-03-12 RX ADMIN — SODIUM CHLORIDE: 9 INJECTION, SOLUTION INTRAVENOUS at 18:53

## 2023-03-12 RX ADMIN — ACETAMINOPHEN 1000 MG: 500 TABLET ORAL at 19:57

## 2023-03-12 RX ADMIN — ACETAMINOPHEN 1000 MG: 500 TABLET ORAL at 08:14

## 2023-03-12 RX ADMIN — CEFTRIAXONE SODIUM 1 G: 1 INJECTION, POWDER, FOR SOLUTION INTRAMUSCULAR; INTRAVENOUS at 19:57

## 2023-03-12 ASSESSMENT — ACTIVITIES OF DAILY LIVING (ADL)
ADLS_ACUITY_SCORE: 40

## 2023-03-12 NOTE — PROGRESS NOTES
[facility]    Medicine Progress Note - Emergency Department Observation Unit  Date of Admission:  3/11/2023    Assessment & Plan      Kim Ybarra is a 82 year old female admitted on 3/11/2023. She has a past medical history of Alzheimer's dementia, cerebral amyloid angiopathy, HTN, HLD, osteoporosis, osteoarthritis who presented to the [facility] ED via EMS after being found down in her [facility] ([facility]) suite.     ##. Weakness  ##. UTI  ##. JAYLEN  ##. H/o Gait Impairment  ##. Chronic bilateral knee pain Left >right.  Patient's daughter visited with patient on 3/11 at 12:00PM, left ~ 1:30PM, received a call ~ 3:00PM stating patient was found down by her couch. Patients daughter states she sat with patient for lunch on 3/11 and noticed patients gait was very slow with her walker, dragging her feet; in retrospect patient had changed from her baseline when she saw her 2 days ago. Patient unable to report what happened. Patient was noted to be more weak and slightly altered mental status compared to her baseline. No definite head trauma.  Patient denies any pain. Per patients daughter, this is the third time patient has been seen in the ED with similar episode. Was at [facility] [place] 12/29/22 with similar event, found to have JAYLEN/UTI.  UA with 10 ketones, 20 protein, moderate LE, 17 WBC, moderate bacteria, 38 hyaline casts. Urine culture pending Covid 19 PCR/Flu A/B/RSV negative. Troponin HS 46 -> 38. EKG NSR. CXR negative. CT Head negative. CT C Spine reports  no acute fracture or traumatic subluxation; multilevel cervical spondylosis, greatest at C4-5, C5-6 and C6-7.  CRP <5.00 Xrays of both knees negative. Suspect patient's difficulty ambulating likely due to acute exacerbation of chronic osteoarthritis pain.   -Continue Rocephin 1gm IV daily  -Follow UCx  -NS 125ml/hr  -Avoid Nephrotoxic drugs  -PT consult     ##. Troponemia: Troponin HS 46 -> 38. EKG NSR. Denies chest pain.  Previously elevated at previous admission. EKG normal.   -Telemetry monitoring    ##. Alzheimer's dementia  ##. Cerebral Amyloid Angiopathy  Patient was seen at  Neurology clinic 6/8/22. Noted MoCA 15/30. FAQ= 28 and met criteria for dementia. She was started on Aricept 6/8/22 however patients daughter states this was discontinued as there was no noticeable benefit considering side effects patient was experiencing. Per patients daughter patients cognitive function continues to decline noticeably  - OT assessment for repeat testing. Concerned patient will need a higher level of care at facility if there is ongoing decline.  - SW/CC consult    ##. HTN: 's-140's/60's-80's  - continue PTA Losartan      ##. HLD: Continue PTA Simvastatin 40mg daily    ##. Osteoarthritis: -Continue Tylenol 1000mg BID    ##. Osteoporosis: No known fractures. Per primary Geriatrician note, discussed possible Dexa and consideration of repeat treatment as she failed previous bisphosphonate d/t gastritis but unclear benefit after initial treatment, more if she is very high risk for fracture. Per primary, Dexa to be considered if continues to have falls. Ordered Vitamin D level but not done yet.         Diet: Regular Diet Adult    DVT Prophylaxis: Ambulate every shift  Dooley Catheter: Not present  Lines: None     Cardiac Monitoring: ACTIVE order. Indication: Chest pain/ ACS rule out (24 hours)  Code Status: No CPR- Do NOT Intubate      Clinically Significant Risk Factors Present on Admission                  # Hypertension: home medication list includes antihypertensive(s)              Disposition Plan      Expected Discharge Date: 03/12/2023                The patient's care was discussed with the Attending Physician, Dr. Trevizo, Bedside Nurse and Patient.    JERONIMO Guerrero CNP  ______________________________________________________________________    Interval History   Admitted overnight     Physical Exam   Vital Signs:  Temp: 98.6  F (37  C) Temp src: Oral BP: (!) 153/80 Pulse: 84   Resp: 12 SpO2: 97 % O2 Device: None (Room air)    Weight: 147 lbs 14.86 oz    Constitutional: awake, alert, cooperative, no apparent distress, and appears stated age  Eyes: Lids and lashes normal, pupils equal, round and reactive to light, extra ocular muscles intact, sclera clear, conjunctiva normal  ENT: Normocephalic, without obvious abnormality, atraumatic, sinuses nontender on palpation, external ears without lesions, oral pharynx with moist mucous membranes, tonsils without erythema or exudates, gums normal and good dentition.  Hematologic / Lymphatic: no cervical lymphadenopathy  Respiratory: No increased work of breathing, good air exchange, clear to auscultation bilaterally, no crackles or wheezing  Cardiovascular: Normal apical impulse, regular rate and rhythm, normal S1 and S2, no S3 or S4, and no murmur noted  GI: No scars, normal bowel sounds, soft, non-distended, non-tender, no masses palpated, no hepatosplenomegally  Skin: no bruising or bleeding  Musculoskeletal: There is no redness, warmth, or swelling of the joints.  Full range of motion noted.  Motor strength is 5 out of 5 all extremities bilaterally.  Tone is normal.  Neurologic: Awake, alert, oriented to name, place; no oriented to time or situation. Is able to state the president; knows her children/grandchildren and names but misses details.  Cranial nerves II-XII are grossly intact.  Motor is 5 out of 5 bilaterally. Sensory is intact.  Babinski down going.  Neuropsychiatric: General: normal eye contact; tangential conversation and perseverates on one issue.     Medical Decision Making       30 MINUTES SPENT BY ME on the date of service doing chart review, history, exam, documentation & further activities per the note.      Data   ------------------------- PAST 24 HR DATA REVIEWED -----------------------------------------------

## 2023-03-12 NOTE — PROGRESS NOTES
Patient interviewed and examined. Labs, imaging and chart notes reviewed.  Kim Ybarra presented to the ED to the ED after being found on the floor at her [facility] center. She was found to have elevated creatinine and possible UTI. She has had a couple of similar episodes in the past with dehydration and UTI.. She has history of dementia, cerebral amyloid angiopathy, UTI, HTN osteoporosis and osteoarthritis. She had been last seen well about 1.5 hours prior to being found on the floor next to her couch and had been appearing weaker than normal for a couple of days. In the ED troponin was mildly elevated (chronic), blood sugar was normal, EKG has NSR with rate of 87, vital signs were normal. Electrolytes were notable for creatinine of 1.76 with baseline around 1. CBC was normal. Covid, RSV and influenza were negative. CT of the head had no acute findings. CT of the cervical spine had multilevel spondylosis without stenosis or acute traumatic injury. CXR was normal. AMS and weakness was felt to be related to dehydration and possible UTI. She was treated with IV hydration and antibiotics and admitted to ED observation.       PMH:  Alzheimer's dementia.  Cerebral amyloid angiopathy  HTN  Osteoarthritis  UTIs    No past surgical history on file.    No family history on file.    Social History     Tobacco Use     Smoking status: Never     Smokeless tobacco: Never   Substance Use Topics     Alcohol use: Not Currently   Lives at [facility].    Physical Exam:  Elderly female in NAD.  /72 (BP Location: Left arm)   Pulse 71   Temp 98.6  F (37  C) (Oral)   Resp 16   Wt 67.1 kg (147 lb 14.9 oz)   SpO2 97%   HEENT: PERRLA . EOMI. Anicteric.  Neck: No mass or bruit. Non tender.  Back: Non tender.  Lungs: Clear.   Cardiac: RRR. Normal S1 and S2. No JVD.  Abdomen: Soft, non tender.  Extrem: No edema. Mild tender both knees. No deformity.  Neuro: Alert. Oriented to person. CN intact. Motor strength  intact.     Results for orders placed or performed during the hospital encounter of 03/11/23 (from the past 48 hour(s))   [place] Draw    Narrative    The following orders were created for panel order [place] Draw.  Procedure                               Abnormality         Status                     ---------                               -----------         ------                     Extra Blue Top Tube[[id number]]                              Final result               Extra Red Top Tube[[id number]]                               Final result               Extra Green Top (Lithium...[[id number]]                      Final result               Extra Purple Top Tube[[id number]]                            Final result                 Please view results for these tests on the individual orders.   Extra Purple Top Tube   Result Value Ref Range    Hold Specimen JI    CBC with platelets differential    Narrative    The following orders were created for panel order CBC with platelets differential.  Procedure                               Abnormality         Status                     ---------                               -----------         ------                     CBC with platelets and d...[[id number]]                      Final result                 Please view results for these tests on the individual orders.   CBC with platelets and differential   Result Value Ref Range    WBC Count 9.7 4.0 - 11.0 10e3/uL    RBC Count 4.50 3.80 - 5.20 10e6/uL    Hemoglobin 12.9 11.7 - 15.7 g/dL    Hematocrit 40.7 35.0 - 47.0 %    MCV 90 78 - 100 fL    MCH 28.7 26.5 - 33.0 pg    MCHC 31.7 31.5 - 36.5 g/dL    RDW 13.5 10.0 - 15.0 %    Platelet Count 192 150 - 450 10e3/uL    % Neutrophils 75 %    % Lymphocytes 14 %    % Monocytes 10 %    % Eosinophils 0 %    % Basophils 1 %    % Immature Granulocytes 0 %    NRBCs per 100 WBC 0 <1 /100    Absolute Neutrophils 7.3 1.6 - 8.3 10e3/uL    Absolute Lymphocytes 1.4 0.8 - 5.3 10e3/uL     Absolute Monocytes 0.9 0.0 - 1.3 10e3/uL    Absolute Eosinophils 0.0 0.0 - 0.7 10e3/uL    Absolute Basophils 0.1 0.0 - 0.2 10e3/uL    Absolute Immature Granulocytes 0.0 <=0.4 10e3/uL    Absolute NRBCs 0.0 10e3/uL   Extra Blue Top Tube   Result Value Ref Range    Hold Specimen JIC    Extra Red Top Tube   Result Value Ref Range    Hold Specimen JIC    Extra Green Top (Lithium Heparin) Tube   Result Value Ref Range    Hold Specimen JIC    Comprehensive metabolic panel   Result Value Ref Range    Sodium 138 136 - 145 mmol/L    Potassium 4.6 3.4 - 5.3 mmol/L    Chloride 103 98 - 107 mmol/L    Carbon Dioxide (CO2) 22 22 - 29 mmol/L    Anion Gap 13 7 - 15 mmol/L    Urea Nitrogen 51.1 (H) 8.0 - 23.0 mg/dL    Creatinine 1.76 (H) 0.51 - 0.95 mg/dL    Calcium 10.1 8.8 - 10.2 mg/dL    Glucose 118 (H) 70 - 99 mg/dL    Alkaline Phosphatase 88 35 - 104 U/L    AST 28 10 - 35 U/L    ALT 25 10 - 35 U/L    Protein Total 6.5 6.4 - 8.3 g/dL    Albumin 4.0 3.5 - 5.2 g/dL    Bilirubin Total 0.2 <=1.2 mg/dL    GFR Estimate 29 (L) >60 mL/min/1.73m2   Magnesium   Result Value Ref Range    Magnesium 2.4 (H) 1.7 - 2.3 mg/dL   Troponin T, High Sensitivity   Result Value Ref Range    Troponin T, High Sensitivity 46 (H) <=14 ng/L   EKG 12-lead, tracing only   Result Value Ref Range    Systolic Blood Pressure  mmHg    Diastolic Blood Pressure  mmHg    Ventricular Rate 87 BPM    Atrial Rate 87 BPM    OR Interval 140 ms    QRS Duration 82 ms     ms    QTc 433 ms    P Axis 51 degrees    R AXIS 22 degrees    T Axis 48 degrees    Interpretation ECG       Sinus rhythm  Septal infarct , age undetermined  Abnormal ECG     CT Head w/o Contrast    Narrative    EXAM: CT HEAD W/O CONTRAST  3/11/2023 5:17 PM     HISTORY:  fall       COMPARISON:  None available.    TECHNIQUE: Using multidetector thin collimation helical acquisition  technique, axial, coronal and sagittal CT images from the skull base  to the vertex were obtained without intravenous  contrast.   (topogram) image(s) also obtained and reviewed.    FINDINGS:  No intracranial hemorrhage, mass effect, or midline shift. No acute  loss of gray-white matter differentiation in the cerebral hemispheres.  Ventricles are proportionate to the cerebral sulci. Clear basal  cisterns. 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     The bony calvaria and the bones of the skull base are normal. The  visualized portions of the paranasal sinuses and mastoid air cells are  clear. Grossly normal orbits. Bilateral pseudophakia. Vascular  calcifications.      Impression    IMPRESSION:   1. No acute intracranial pathology.   2. 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    I have personally reviewed the examination and initial interpretation  and I agree with the findings.    MEGHANN FAJARDO MD         SYSTEM ID:  [id number]   CT Cervical Spine w/o Contrast    Narrative    EXAM: CT CERVICAL SPINE W/O CONTRAST  3/11/2023 5:18 PM     HISTORY:  fall       COMPARISON:  None available.    TECHNIQUE: Using multidetector thin collimation helical acquisition  technique, axial, coronal and sagittal CT images through the cervical  spine were obtained without intravenous contrast.    FINDINGS:  Trace anterolisthesis of C3 on C4. No acute fracture or traumatic  subluxation. Multilevel disc space narrowing, which is most notable at  C4-5, C5-6 and C6-7. Ankylosis of the right C3-4 facets. Disc  osteophyte complex at C4-5 and C5-6 likely resulting in mild spinal  canal stenosis at C4-5. Moderate right neural foraminal narrowing at  C3-4, otherwise no high-grade neural foraminal narrowing. No  prevertebral edema. No abnormality of the paraspinous soft tissues.  Atrophic appearance of the right thyroid lobe.      Impression    IMPRESSION:  1. No acute fracture or traumatic subluxation.  2. Multilevel cervical spondylosis, which is greatest at C4-5, C5-6  and C6-7. No high-grade spinal canal or neural foraminal narrowing.    I have personally reviewed  the examination and initial interpretation  and I agree with the findings.    MEGHANN FAJARDO MD         SYSTEM ID:  [id number]   Symptomatic Influenza A/B, RSV, & SARS-CoV2 PCR (COVID-19) Nasopharyngeal    Specimen: Nasopharyngeal; Swab   Result Value Ref Range    Influenza A PCR Negative Negative    Influenza B PCR Negative Negative    RSV PCR Negative Negative    SARS CoV2 PCR Negative Negative    Narrative    Testing was performed using the Xpert Xpress CoV2/Flu/RSV Assay on the [employer] Gene[employer]pert Instrument. This test should be ordered for the detection of SARS-CoV-2, influenza, and RSV viruses in individuals who meet clinical and/or epidemiological criteria. Test performance is unknown in asymptomatic patients. This test is for in vitro diagnostic use under the FDA EUA for laboratories certified under CLIA to perform high or moderate complexity testing. This test has not been FDA cleared or approved. A negative result does not rule out the presence of PCR inhibitors in the specimen or target RNA in concentration below the limit of detection for the assay. If only one viral target is positive but coinfection with multiple targets is suspected, the sample should be re-tested with another FDA cleared, approved, or authorized test, if coinfection would change clinical management. This test was validated by the [facility]. These laboratories are certified under the Clinical Laboratory Improvement Amendments of 1988 (CLIA-88) as qualified to perform high complexity laboratory testing.   XR Chest 2 Views    Narrative    EXAM: XR CHEST 2 VIEWS  3/11/2023 5:23 PM      HISTORY: fall, weakness    COMPARISON: None available.    FINDINGS: Two views of the chest. No significant tracheal deviation.  Normal cardiomediastinal silhouette. No pleural effusion,  consolidation or pneumothorax. No acute osseous abnormalities.  Atherosclerotic calcification of the aortic knob. Scoliotic curvature  of the thoracic  spine.      Impression    IMPRESSION: No focal pulmonary opacity.    I have personally reviewed the examination and initial interpretation  and I agree with the findings.    DERRICK RAMOS MD         SYSTEM ID:  [id number]   UA with Microscopic reflex to Culture    Specimen: Urine, Midstream   Result Value Ref Range    Color Urine Yellow Colorless, Straw, Light Yellow, Yellow    Appearance Urine Slightly Cloudy (A) Clear    Glucose Urine Negative Negative mg/dL    Bilirubin Urine Negative Negative    Ketones Urine 10 (A) Negative mg/dL    Specific Gravity Urine 1.026 1.003 - 1.035    Blood Urine Negative Negative    pH Urine 5.0 5.0 - 7.0    Protein Albumin Urine 20 (A) Negative mg/dL    Urobilinogen Urine Normal Normal, 2.0 mg/dL    Nitrite Urine Negative Negative    Leukocyte Esterase Urine Moderate (A) Negative    Bacteria Urine Moderate (A) None Seen /HPF    Mucus Urine Present (A) None Seen /LPF    RBC Urine 2 <=2 /HPF    WBC Urine 17 (H) <=5 /HPF    Squamous Epithelials Urine 1 <=1 /HPF    Transitional Epithelials Urine 1 <=1 /HPF    Hyaline Casts Urine 38 (H) <=2 /LPF    Narrative    Urine Culture ordered based on laboratory criteria   Troponin T, High Sensitivity   Result Value Ref Range    Troponin T, High Sensitivity 38 (H) <=14 ng/L   CRP inflammation   Result Value Ref Range    CRP Inflammation <3.00 <5.00 mg/L   EKG 12-lead, complete   Result Value Ref Range    Systolic Blood Pressure  mmHg    Diastolic Blood Pressure  mmHg    Ventricular Rate 76 BPM    Atrial Rate 76 BPM    AZ Interval 154 ms    QRS Duration 90 ms     ms    QTc 429 ms    P Axis 44 degrees    R AXIS 0 degrees    T Axis 28 degrees    Interpretation ECG       Sinus rhythm  Normal ECG  When compared with ECG of 11-MAR-2023 16:41, (unconfirmed)  Criteria for Septal infarct are no longer Present     CBC with Platelets & Differential    Narrative    The following orders were created for panel order CBC with Platelets &  Differential.  Procedure                               Abnormality         Status                     ---------                               -----------         ------                     CBC with platelets and d...[[id number]]  Abnormal            Final result                 Please view results for these tests on the individual orders.   Troponin T, High Sensitivity   Result Value Ref Range    Troponin T, High Sensitivity 41 (H) <=14 ng/L   Comprehensive metabolic panel   Result Value Ref Range    Sodium 141 136 - 145 mmol/L    Potassium 4.4 3.4 - 5.3 mmol/L    Chloride 115 (H) 98 - 107 mmol/L    Carbon Dioxide (CO2) 19 (L) 22 - 29 mmol/L    Anion Gap 7 7 - 15 mmol/L    Urea Nitrogen 30.3 (H) 8.0 - 23.0 mg/dL    Creatinine 0.95 0.51 - 0.95 mg/dL    Calcium 8.6 (L) 8.8 - 10.2 mg/dL    Glucose 87 70 - 99 mg/dL    Alkaline Phosphatase 69 35 - 104 U/L    AST 20 10 - 35 U/L    ALT 16 10 - 35 U/L    Protein Total 5.1 (L) 6.4 - 8.3 g/dL    Albumin 3.1 (L) 3.5 - 5.2 g/dL    Bilirubin Total 0.3 <=1.2 mg/dL    GFR Estimate 60 (L) >60 mL/min/1.73m2   CRP inflammation   Result Value Ref Range    CRP Inflammation <3.00 <5.00 mg/L   CBC with platelets and differential   Result Value Ref Range    WBC Count 5.3 4.0 - 11.0 10e3/uL    RBC Count 3.90 3.80 - 5.20 10e6/uL    Hemoglobin 11.1 (L) 11.7 - 15.7 g/dL    Hematocrit 36.1 35.0 - 47.0 %    MCV 93 78 - 100 fL    MCH 28.5 26.5 - 33.0 pg    MCHC 30.7 (L) 31.5 - 36.5 g/dL    RDW 13.5 10.0 - 15.0 %    Platelet Count 142 (L) 150 - 450 10e3/uL    % Neutrophils 58 %    % Lymphocytes 27 %    % Monocytes 12 %    % Eosinophils 2 %    % Basophils 1 %    % Immature Granulocytes 0 %    NRBCs per 100 WBC 0 <1 /100    Absolute Neutrophils 3.1 1.6 - 8.3 10e3/uL    Absolute Lymphocytes 1.4 0.8 - 5.3 10e3/uL    Absolute Monocytes 0.6 0.0 - 1.3 10e3/uL    Absolute Eosinophils 0.1 0.0 - 0.7 10e3/uL    Absolute Basophils 0.1 0.0 - 0.2 10e3/uL    Absolute Immature Granulocytes 0.0 <=0.4 10e3/uL     Absolute NRBCs 0.0 10e3/uL     Impression:  Elderly woman with Alzheimer's dementia presents with unwitnessed fall at her assisted living facility. Her creatinine was significantly elevated consistent with dehydration. She has no significant traumatic injuries. UA is suggestive of  Possible UTI. EKG was normal. Vital signs remain stable. CT of head and cervical spine were negative for stroke, hemorrhage or acute traumatic injuries. Creatinine has improved to 0.95 from 1.76 with hydration. UC is pending. Knee XR pending. Exam suggests DJD but will check XR given recent fall. It appears that dehydration triggering AMS changes has become a recurring problem. She will need ongoing encouragement/reminders at her residence to keep hydrated and encouraged to drink water.     Plan:  Check bilateral knee XR and await preliminary UC.   Anticipate discharge back to current residence with daughter in AM.    Mikael Trevizo MD

## 2023-03-12 NOTE — PLAN OF CARE
Goal Outcome Evaluation:    -diagnostic tests and consults completed and resulted. Not met    -vital signs normal or at patient baseline. Met. /76 (BP Location: Left arm, Patient Position: Supine, Cuff Size: Adult Regular)   Pulse 79   Temp 98  F (36.7  C) (Oral)   Resp 15   SpO2 98%    -tolerating oral intake to maintain hydration. Not met, infusing 125 mL/hr NS  -adequate pain control on oral analgesics. Met    -tolerating oral antibiotics or has plans for home infusion setup. In progress   -infection is improving. In progress   -returns to baseline functional status. Not met    -safe disposition plan has been identified. In progress    -OT/PT evaluation completed. Not met

## 2023-03-12 NOTE — PLAN OF CARE
Outpatient/Observation goals to be met before discharge home:  Goal Outcome Evaluation:  -diagnostic tests and consults completed and resulted. Not met    -vital signs normal or at patient baseline. Met.  BP (!) 153/80 (BP Location: Left arm, Patient Position: Prone, Cuff Size: Adult Regular)   Pulse 84   Temp 98.6  F (37  C) (Oral)   Resp 12   Wt 67.1 kg (147 lb 14.9 oz)   SpO2 97%   -tolerating oral intake to maintain hydration. Not met, IVF NS infusing @ 125 mL/hr  -adequate pain control on oral analgesics. Met, pt on scheduled Tylenol, denies pain at this time  -tolerating oral antibiotics or has plans for home infusion setup. In progress   -infection is improving. In progress, pt on IV Rocephin Q24hrs  -returns to baseline functional status. Not met    -safe disposition plan has been identified. In progress    -OT/PT evaluation completed. Not met, walker ordered and at bedside

## 2023-03-12 NOTE — PROGRESS NOTES
Care Management Follow Up    Length of Stay (days): 0    Expected Discharge Date: 03/12/2023     Concerns to be Addressed:  Faustin document/ Discharge planning.     Patient plan of care discussed at interdisciplinary rounds: Yes    Anticipated Discharge Disposition:  TBD     Anticipated Discharge Services:    Anticipated Discharge DME:      Patient/family educated on Medicare website which has current facility and service quality ratings:  N/A  Education Provided on the Discharge Plan:  N/A  Patient/Family in Agreement with the Plan:  N/A    Referrals Placed by CM/SW:  None  Private pay costs discussed: insurance costs out of pocket expenses, co-pays and deductibles.     Additional Information:  Chart reviewed. Case discussed with bedside RN and medical provider. Pt is from [facility]. PT will meet with pt this afternoon.     0980  Due to pt's Observation status, SW met with patient at bedside and introduced self. SW explain role and reviewed the Medicare Outpatient Observation Notice (MOON) with pt. SW addressed questions and concerns. SW encouraged patient to follow up with their insurance plan directly to receive the most accurate information. Patient signed FAUSTIN form. SW provide a copy for pt and placed the signed form in patient's chart.    3550  SW call pt's daughter, Alex to confirm address of [facility] facility: [address] [place] [address]. Alex report pt has been there for 6 months. Pt lives in their care suite section of the facility which provides a higher level of care and wellness checks are done every couple of hours. Alex believes it's safe for pt to return back to her apartment. Alex has thought about placing pt in SNF facility where pt can get a higher level of cares to meet her needs.     SW called [facility] [phone] and left VM for Elva Herzog in housing and .     SW found information from pt's previous hospitalization discharge note. SHAY Stacy, ([phone]) works  closely with patient from [facility]. SW called the number and reached Vanesa an aid on the floor. Confirm pt is from facility. HEIDE asked who to call if pt discharge from hospital. Do they need discharge orders? Vanesa report if there are any new medication discharge orders can just be sent with pt. Otherwise there is a nurse line [phone] to call and report any changes.      available and will continue to follow for discharge planning and supports as needed.     _______________________    BRANDEN Childers, LSW  ED/OBS   [facility]  Phone: [phone]  Pager: [phone]  Fax: [phone]     On-call pager, [phone], 4:00 pm to midnight

## 2023-03-12 NOTE — H&P
[facility]    History and Physical - ED Observation Service      Date of Admission:  3/11/2023    Assessment & Plan      Kim Ybarra is a 82 year old female admitted on 3/11/2023. She has a past medical history of Alzheimer's dementia, cerebral amyloid angiopathy, HTN, HLD, osteoporosis, osteoarthritis who presented to the [facility] ED via EMS after being found down in her correction ([facility]) suite.     ##. Weakness  ##. UTI  ##. JAYLEN  ##. H/o Gait Impairment  Patient's daughter visited with patient today ~ 12:00PM, left ~ 1:30PM, received a call ~ 3:00PM stating patient was found down by her couch. Patients daughter states she sat with patient for lunch and noticed patients gait was very slow today with her walker, dragging her feet; in retrospect patient had changed from her baseline when she saw her 2 days ago. Patient unable to report what happened. Patient was noted to be more weak and slightly altered mental status compared to her baseline. No definite head trauma. Blood sugar 143 per EMS. Patient denies any pain. Per patients daughter, this is the third time patient has been seen in the ED with similar episode. [place] [facility] [place] similar event, found to have JAYLEN/UTI. Today in ED, HR 70's-90's, 's-140's/60's-80's, RR 15-17, SaO2 96-98% on RA, Temp 98.4  F. Labs show CMP with BUN 51, Cr 1.76, GFR 29 (10/2022- BUN 28, Cr 1.0, GFR 56) otherwise unremarkable. Normal CBC. UA with 10 ketones, 20 protein, moderate LE, 17 WBC, moderate bacteria, 38 hyaline casts. Urine culture sent and pending. Covid 19 PCR/Flu A/B/RSV negative. Troponin HS 46 -> 38. EKG NSR. CXR negative. CT Head negative. CT C Spine reports  no acute fracture or traumatic subluxation; multilevel cervical spondylosis, greatest at C4-5, C5-6 and C6-7. In ED patient was given 2L NS bolus, NS 125ml/hr, rocephin 1gm IV x 1.   -Continue Rocephin 1gm IV daily  -Follow UCx  -Add CRP to labs  -Repeat  CBC, CMP, CRP, Troponin HS in AM  -NS 125ml/hr  -Avoid Nephrotoxic drugs  -PT consult    ##. Troponemia: Troponin HS 46 -> 38. EKG NSR. Denies chest pain. Previously elevated at previous admission. Tele Lead II appears different from EKG.   -Repeat EKG in AM  -Repeat Troponin HS in AM  -Telemetry monitoring    ##. Alzheimer's dementia  ##. Cerebral Amyloid Angiopathy  Patient was seen at  Neurology clinic 6/8/22. Noted MoCA 15/30. FAQ= 28 and met criteria for dementia. She was started on Aricept 6/8/22 however patients daughter states this was discontinued as there was no noticeable benefit considering side effects patient was experiencing. Per patients daughter patients cognitive function continues to decline noticeably  - OT assessment for repeat testing. Concerned patient will need a higher level of care at facility if there is ongoing decline.  - SW/CC consult    ##. HTN: 's-140's/60's-80's  - Hold PTA Losartan 50mg daily due to JAYLEN. If Cr improved in AM then resume.     ##. HLD: Continue PTA Simvastatin 40mg daily    ##. Osteoarthritis: -Continue Tylenol 1000mg BID    ##. Osteoporosis: No known fractures. Per primary Geriatrician note, discussed possible Dexa and consideration of repeat treatment as she failed previous bisphosphonate d/t gastritis but unclear benefit after initial treatment, more if she is very high risk for fracture. Per primary, Dexa to be considered if continues to have falls. Ordered Vitamin D level but not done yet.         Diet: Regular Diet Adult    DVT Prophylaxis: Pneumatic Compression Devices  Dooley Catheter: Not present  Lines: None     Cardiac Monitoring: None  Code Status:   DNR/DNI    Clinically Significant Risk Factors Present on Admission                  # Hypertension: home medication list includes antihypertensive(s)              Disposition Plan      Expected Discharge Date: 03/12/2023                The patient's care was discussed with the Attending Physician,   Santhosh.    VIANNEY Hsu   ED Observation Service   [facility]  Securely message with [employer] (more info)  Text page via [facility] Paging/Directory     ______________________________________________________________________    Chief Complaint   Fall    History is obtained from the patient's daughter    History of Present Illness   Kim Ybarra is a 82 year old female with a past medical history of Alzheimer's dementia, cerebral amyloid angiopathy, HTN, HLD, osteoporosis, osteoarthritis who presented to the [facility] ED via EMS after being found down. Patient's daughter states patient lives at [facility] in a Care Suite which provides a higher level of care where patient is checked on every 2 hours, toileted every 2 hours and has assistance with ADLs and medications; meals brought to her or assisted to the dining area. Patient's daughter states she had just visited with patient today ~ 12:00PM and left at ~ 1:30PM. She received a call at ~ 3:00PM stating patient was found down by her couch and confused. Patients daughters states she sat with patient for lunch. She noticed that patients gait was very slow today with her walker and was dragging her feet and in retrospect patient had changed from her baseline when she saw her 2 days ago. Patient unable to report what happened. Patient was noted to be more weak and slightly altered mental status compared to her baseline. No definite head trauma. Blood sugar 143 per EMS. Patient denies any pain anywhere. Per patients daughter, this is the third time patient has been seen in the emergency department with similar symptoms. She reports that in the past she has been found to be dehydrated or have a UTI. Per chart review patient was at [facility] [place] with similar event. Suspected metabolic encephalopathy due to dehydration and/or UTI. UCx returned with 3k E. Coli. She was started on Rocephin. She was found to have JAYLEN  suspected to be due to dehydration as patient had not been drinking fluids and eating like she should. Per daughter, patient uses a walker and walks independently. Patient was seen by her Geriatrician on 3/9/23 and no changes were made to her management.     In the ED, HR 70's-90's, 's-140's/60's-80's, RR 15-17, SaO2 96-98% on RA, Temp 98.4  F. Labs show CMP with BUN 51, Cr 1.76, GFR 29 otherwise unremarkable. Normal CBC. UA with 10 ketones, 20 protein, moderate LE, 17 WBC, moderate bacteria, 38 hyaline casts. Urine culture sent and pending. Covid 19 PCR/Flu A/B/RSV negative. Troponin HS 46 -> 38. EKG NSR. CXR negative. CT Head negative. CT C Spine reports  no acute fracture or traumatic subluxation; multilevel cervical spondylosis, greatest at C4-5, C5-6 and C6-7. In the ED the patient was given 2L NS bolus, NS 125ml/hr, rocephin 1gm IV x 1. Patient is being admitted for further monitoring and to ensure that patient's creatinine improves with IV hydration.      Past Medical History    No past medical history on file.    Past Surgical History   No past surgical history on file.    Prior to Admission Medications   Prior to Admission Medications   Prescriptions Last Dose Informant Patient Reported? Taking?   Calcium Polycarbophil (FIBER) 625 MG tablet   Yes Yes   Sig: Take 625 mg by mouth daily   losartan (COZAAR) 50 MG tablet   Yes Yes   Sig: Take 50 mg by mouth daily   multivitamin w/minerals (CERTAVITE/ANTIOXIDANTS) tablet   Yes No   Sig: Take 1 tablet by mouth daily   simvastatin (ZOCOR) 80 MG tablet   Yes Yes   Sig: Take 40 mg by mouth daily      Facility-Administered Medications: None        Review of Systems    All other ROS negative except those mentioned in above note.        Physical Exam   Vital Signs: Temp: 98.4  F (36.9  C) Temp src: Oral BP: 126/70 Pulse: 94   Resp: 17 SpO2: 97 % O2 Device: None (Room air)    Weight: 0 lbs 0 oz    Constitutional: awake, alert, cooperative, no apparent distress, and  appears stated age  Eyes: Lids and lashes normal, pupils equal, round and reactive to light, extra ocular muscles intact, sclera clear, conjunctiva normal  ENT: Normocephalic, without obvious abnormality, atraumatic, sinuses nontender on palpation, external ears without lesions, oral pharynx with moist mucous membranes, tonsils without erythema or exudates, gums normal and good dentition.  Hematologic / Lymphatic: no cervical lymphadenopathy  Respiratory: No increased work of breathing, good air exchange, clear to auscultation bilaterally, no crackles or wheezing  Cardiovascular: Normal apical impulse, regular rate and rhythm, normal S1 and S2, no S3 or S4, and no murmur noted  GI: No scars, normal bowel sounds, soft, non-distended, non-tender, no masses palpated, no hepatosplenomegally  Skin: no bruising or bleeding  Musculoskeletal: There is no redness, warmth, or swelling of the joints.  Full range of motion noted.  Motor strength is 5 out of 5 all extremities bilaterally.  Tone is normal.  Neurologic: Awake, alert, oriented to name, place; no oriented to time or situation. Is able to state the president; knows her children/grandchildren and names but misses details.  Cranial nerves II-XII are grossly intact.  Motor is 5 out of 5 bilaterally. Sensory is intact.  Babinski down going.  Neuropsychiatric: General: normal eye contact; tangential conversation and perseverates on one issue.     Medical Decision Making       **CLEAR ALL SELECTIONS**      Data     I have personally reviewed the following data over the past 24 hrs:    9.7  \   12.9   / 192     138 103 51.1 (H) /  118 (H)   4.6 22 1.76 (H) \       ALT: 25 AST: 28 AP: 88 TBILI: 0.2   ALB: 4.0 TOT PROTEIN: 6.5 LIPASE: N/A       Trop: 38 (H) BNP: N/A       Imaging results reviewed over the past 24 hrs:   Recent Results (from the past 24 hour(s))   CT Head w/o Contrast    Narrative    EXAM: CT HEAD W/O CONTRAST  3/11/2023 5:17 PM     HISTORY:  fall        COMPARISON:  None available.    TECHNIQUE: Using multidetector thin collimation helical acquisition  technique, axial, coronal and sagittal CT images from the skull base  to the vertex were obtained without intravenous contrast.   (topogram) image(s) also obtained and reviewed.    FINDINGS:  No intracranial hemorrhage, mass effect, or midline shift. No acute  loss of gray-white matter differentiation in the cerebral hemispheres.  Ventricles are proportionate to the cerebral sulci. Clear basal  cisterns. 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     The bony calvaria and the bones of the skull base are normal. The  visualized portions of the paranasal sinuses and mastoid air cells are  clear. Grossly normal orbits. Bilateral pseudophakia. Vascular  calcifications.      Impression    IMPRESSION:   1. No acute intracranial pathology.   2. 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    I have personally reviewed the examination and initial interpretation  and I agree with the findings.    MEGHANN FAJARDO MD         SYSTEM ID:  [id number]   CT Cervical Spine w/o Contrast    Narrative    EXAM: CT CERVICAL SPINE W/O CONTRAST  3/11/2023 5:18 PM     HISTORY:  fall       COMPARISON:  None available.    TECHNIQUE: Using multidetector thin collimation helical acquisition  technique, axial, coronal and sagittal CT images through the cervical  spine were obtained without intravenous contrast.    FINDINGS:  Trace anterolisthesis of C3 on C4. No acute fracture or traumatic  subluxation. Multilevel disc space narrowing, which is most notable at  C4-5, C5-6 and C6-7. Ankylosis of the right C3-4 facets. Disc  osteophyte complex at C4-5 and C5-6 likely resulting in mild spinal  canal stenosis at C4-5. Moderate right neural foraminal narrowing at  C3-4, otherwise no high-grade neural foraminal narrowing. No  prevertebral edema. No abnormality of the paraspinous soft tissues.  Atrophic appearance of the right thyroid lobe.      Impression     IMPRESSION:  1. No acute fracture or traumatic subluxation.  2. Multilevel cervical spondylosis, which is greatest at C4-5, C5-6  and C6-7. No high-grade spinal canal or neural foraminal narrowing.    I have personally reviewed the examination and initial interpretation  and I agree with the findings.    MEGHANN FAJARDO MD         SYSTEM ID:  [id number]   XR Chest 2 Views    Narrative    EXAM: XR CHEST 2 VIEWS  3/11/2023 5:23 PM      HISTORY: fall, weakness    COMPARISON: None available.    FINDINGS: Two views of the chest. No significant tracheal deviation.  Normal cardiomediastinal silhouette. No pleural effusion,  consolidation or pneumothorax. No acute osseous abnormalities.  Atherosclerotic calcification of the aortic knob. Scoliotic curvature  of the thoracic spine.      Impression    IMPRESSION: No focal pulmonary opacity.    I have personally reviewed the examination and initial interpretation  and I agree with the findings.    DERRICK RAMOS MD         SYSTEM ID:  [id number]

## 2023-03-12 NOTE — PLAN OF CARE
Outpatient/Observation goals to be met before discharge home:  Goal Outcome Evaluation:  -diagnostic tests and consults completed and resulted. Not met    -vital signs normal or at patient baseline. Met.  /77 (BP Location: Left arm)   Pulse 78   Temp 98.5  F (36.9  C) (Oral)   Resp 16   Wt 67.1 kg (147 lb 14.9 oz)   SpO2 94%   -tolerating oral intake to maintain hydration. In process, IVF NS infusing @ 125 mL/hr, tolerating po fluids  -adequate pain control on oral analgesics. Met, pain well managed on scheduled Tylenol  -tolerating oral antibiotics or has plans for home infusion setup. In progress, pt on IV Rocephin Q24hrs  -infection is improving. In progress   -returns to baseline functional status. Not met , ambulated to bathroom /c Assist x 1, gait belt and walker. Pt slow to move, shuffles feet, needs repeat cuing and reminders.   -safe disposition plan has been identified. In progress    -OT/PT evaluation completed. Not met, walker ordered and at bedside

## 2023-03-13 VITALS
OXYGEN SATURATION: 96 % | HEART RATE: 83 BPM | WEIGHT: 147.93 LBS | SYSTOLIC BLOOD PRESSURE: 166 MMHG | RESPIRATION RATE: 27 BRPM | TEMPERATURE: 98.5 F | DIASTOLIC BLOOD PRESSURE: 86 MMHG

## 2023-03-13 LAB
ANION GAP SERPL CALCULATED.3IONS-SCNC: 9 MMOL/L (ref 7–15)
ATRIAL RATE - MUSE: 76 BPM
ATRIAL RATE - MUSE: 87 BPM
BACTERIA UR CULT: NORMAL
BUN SERPL-MCNC: 19 MG/DL (ref 8–23)
CALCIUM SERPL-MCNC: 9.3 MG/DL (ref 8.8–10.2)
CHLORIDE SERPL-SCNC: 112 MMOL/L (ref 98–107)
CREAT SERPL-MCNC: 0.85 MG/DL (ref 0.51–0.95)
DEPRECATED HCO3 PLAS-SCNC: 18 MMOL/L (ref 22–29)
DIASTOLIC BLOOD PRESSURE - MUSE: NORMAL MMHG
DIASTOLIC BLOOD PRESSURE - MUSE: NORMAL MMHG
GFR SERPL CREATININE-BSD FRML MDRD: 68 ML/MIN/1.73M2
GLUCOSE SERPL-MCNC: 97 MG/DL (ref 70–99)
INTERPRETATION ECG - MUSE: NORMAL
INTERPRETATION ECG - MUSE: NORMAL
P AXIS - MUSE: 44 DEGREES
P AXIS - MUSE: 51 DEGREES
POTASSIUM SERPL-SCNC: 4.7 MMOL/L (ref 3.4–5.3)
PR INTERVAL - MUSE: 140 MS
PR INTERVAL - MUSE: 154 MS
QRS DURATION - MUSE: 82 MS
QRS DURATION - MUSE: 90 MS
QT - MUSE: 360 MS
QT - MUSE: 382 MS
QTC - MUSE: 429 MS
QTC - MUSE: 433 MS
R AXIS - MUSE: 0 DEGREES
R AXIS - MUSE: 22 DEGREES
SODIUM SERPL-SCNC: 139 MMOL/L (ref 136–145)
SYSTOLIC BLOOD PRESSURE - MUSE: NORMAL MMHG
SYSTOLIC BLOOD PRESSURE - MUSE: NORMAL MMHG
T AXIS - MUSE: 28 DEGREES
T AXIS - MUSE: 48 DEGREES
VENTRICULAR RATE- MUSE: 76 BPM
VENTRICULAR RATE- MUSE: 87 BPM

## 2023-03-13 PROCEDURE — 250N000013 HC RX MED GY IP 250 OP 250 PS 637: Performed by: PHYSICIAN ASSISTANT

## 2023-03-13 PROCEDURE — 36415 COLL VENOUS BLD VENIPUNCTURE: CPT | Performed by: PHYSICIAN ASSISTANT

## 2023-03-13 PROCEDURE — 80048 BASIC METABOLIC PNL TOTAL CA: CPT | Performed by: PHYSICIAN ASSISTANT

## 2023-03-13 PROCEDURE — G0378 HOSPITAL OBSERVATION PER HR: HCPCS

## 2023-03-13 RX ORDER — CEFDINIR 300 MG/1
300 CAPSULE ORAL 2 TIMES DAILY
Qty: 6 CAPSULE | Refills: 0 | Status: ON HOLD | OUTPATIENT
Start: 2023-03-13 | End: 2023-05-23

## 2023-03-13 RX ADMIN — CALCIUM POLYCARBOPHIL 625 MG: 625 TABLET, FILM COATED ORAL at 10:34

## 2023-03-13 RX ADMIN — LOSARTAN POTASSIUM 50 MG: 25 TABLET, FILM COATED ORAL at 10:34

## 2023-03-13 RX ADMIN — ACETAMINOPHEN 1000 MG: 500 TABLET ORAL at 10:32

## 2023-03-13 ASSESSMENT — ACTIVITIES OF DAILY LIVING (ADL)
ADLS_ACUITY_SCORE: 44
ADLS_ACUITY_SCORE: 44
ADLS_ACUITY_SCORE: 40
ADLS_ACUITY_SCORE: 44
ADLS_ACUITY_SCORE: 44
DEPENDENT_IADLS:: CLEANING;COOKING;LAUNDRY;SHOPPING;MEAL PREPARATION;MEDICATION MANAGEMENT;MONEY MANAGEMENT;TRANSPORTATION
ADLS_ACUITY_SCORE: 44
ADLS_ACUITY_SCORE: 44

## 2023-03-13 NOTE — PROGRESS NOTES
Patient interviewed and examined. Labs, imaging and chart notes reviewed.  Kim Ybarra has a history of Alzheimer's dementia, amyloid cerebral angiopathy, HTN, UTIs, osteoporosis and osteoarthritis. She presented to the ED 2 days ago after being found on the floor in front of her couch at her assisted living facility. She had last been seen about 1.5 hours prior. She had been noted to have some generalized weakness and decline in mental status for several days prior. She has had a couple of past admissions for similar symptoms associated with dehydration and possible UTI. She had no obvious trauma. She has some pain and stiffness in her knees without ecchymosis. There was a small effusion in her right knee on imaging. The left knee was painful on varus stress. XR of the knees was otherwise negative for traumatic injuries. In the ED EKG was normal as were vital signs. Electrolytes notable for creatinine of 1.76 compared to baseline of 1 when well hydrated. CBC Covid, RSV and influenza were negative. CT of the head was unremarkable.CXR was clear. CT of the cervical spine had multilevel sponylosis without stenosis. UA had elevated WBC. Urine culture grew 50-100K mixed magi. She was treated with IV fluids and antibiotic for UTI with improvement in strength and mental status.    PMH: Alzheimer's dementia.  Amyloid angiopathy  HTN  UTIs  Osteoarthritis    SH:  Lives in [facility] assisted living/ memory care. She is checked on by staff every 2 hours, but is not in highly monitored or skilled care facility.    Physical Exam:  Elderly female in [facility]. Somewhat confabulatory.  BP (!) 152/81 (BP Location: Left arm)   Pulse 79   Temp 98.5  F (36.9  C) (Oral)   Resp 16   Wt 67.1 kg (147 lb 14.9 oz)   SpO2 96%   HEENT: PERRLA  Lungs: Clear.  Cardiac: RRR. Normal S1 and S2.  Neck: Non tender.  Back: Non tender.  Abdomen: Soft, non tender.  Extrem:No edema.  Neuro: Oriented x 2. CN, motor, coordination intact. Speech with  limited range of stereotyped expressions.  Psych: Blunt affect.    Results for orders placed or performed during the hospital encounter of 03/11/23 (from the past 48 hour(s))   Hector Draw    Narrative    The following orders were created for panel order Hector Draw.  Procedure                               Abnormality         Status                     ---------                               -----------         ------                     Extra Blue Top Tube[[id number]]                              Final result               Extra Red Top Tube[[id number]]                               Final result               Extra Green Top (Lithium...[[id number]]                      Final result               Extra Purple Top Tube[[id number]]                            Final result                 Please view results for these tests on the individual orders.   Extra Purple Top Tube   Result Value Ref Range    Hold Specimen JI    CBC with platelets differential    Narrative    The following orders were created for panel order CBC with platelets differential.  Procedure                               Abnormality         Status                     ---------                               -----------         ------                     CBC with platelets and d...[[id number]]                      Final result                 Please view results for these tests on the individual orders.   CBC with platelets and differential   Result Value Ref Range    WBC Count 9.7 4.0 - 11.0 10e3/uL    RBC Count 4.50 3.80 - 5.20 10e6/uL    Hemoglobin 12.9 11.7 - 15.7 g/dL    Hematocrit 40.7 35.0 - 47.0 %    MCV 90 78 - 100 fL    MCH 28.7 26.5 - 33.0 pg    MCHC 31.7 31.5 - 36.5 g/dL    RDW 13.5 10.0 - 15.0 %    Platelet Count 192 150 - 450 10e3/uL    % Neutrophils 75 %    % Lymphocytes 14 %    % Monocytes 10 %    % Eosinophils 0 %    % Basophils 1 %    % Immature Granulocytes 0 %    NRBCs per 100 WBC 0 <1 /100    Absolute Neutrophils 7.3 1.6 - 8.3 10e3/uL     Absolute Lymphocytes 1.4 0.8 - 5.3 10e3/uL    Absolute Monocytes 0.9 0.0 - 1.3 10e3/uL    Absolute Eosinophils 0.0 0.0 - 0.7 10e3/uL    Absolute Basophils 0.1 0.0 - 0.2 10e3/uL    Absolute Immature Granulocytes 0.0 <=0.4 10e3/uL    Absolute NRBCs 0.0 10e3/uL   Extra Blue Top Tube   Result Value Ref Range    Hold Specimen JIC    Extra Red Top Tube   Result Value Ref Range    Hold Specimen JIC    Extra Green Top (Lithium Heparin) Tube   Result Value Ref Range    Hold Specimen JIC    Comprehensive metabolic panel   Result Value Ref Range    Sodium 138 136 - 145 mmol/L    Potassium 4.6 3.4 - 5.3 mmol/L    Chloride 103 98 - 107 mmol/L    Carbon Dioxide (CO2) 22 22 - 29 mmol/L    Anion Gap 13 7 - 15 mmol/L    Urea Nitrogen 51.1 (H) 8.0 - 23.0 mg/dL    Creatinine 1.76 (H) 0.51 - 0.95 mg/dL    Calcium 10.1 8.8 - 10.2 mg/dL    Glucose 118 (H) 70 - 99 mg/dL    Alkaline Phosphatase 88 35 - 104 U/L    AST 28 10 - 35 U/L    ALT 25 10 - 35 U/L    Protein Total 6.5 6.4 - 8.3 g/dL    Albumin 4.0 3.5 - 5.2 g/dL    Bilirubin Total 0.2 <=1.2 mg/dL    GFR Estimate 29 (L) >60 mL/min/1.73m2   Magnesium   Result Value Ref Range    Magnesium 2.4 (H) 1.7 - 2.3 mg/dL   Troponin T, High Sensitivity   Result Value Ref Range    Troponin T, High Sensitivity 46 (H) <=14 ng/L   EKG 12-lead, tracing only   Result Value Ref Range    Systolic Blood Pressure  mmHg    Diastolic Blood Pressure  mmHg    Ventricular Rate 87 BPM    Atrial Rate 87 BPM    DE Interval 140 ms    QRS Duration 82 ms     ms    QTc 433 ms    P Axis 51 degrees    R AXIS 22 degrees    T Axis 48 degrees    Interpretation ECG       Sinus rhythm  Septal infarct , age undetermined  Abnormal ECG     CT Head w/o Contrast    Narrative    EXAM: CT HEAD W/O CONTRAST  3/11/2023 5:17 PM     HISTORY:  fall       COMPARISON:  None available.    TECHNIQUE: Using multidetector thin collimation helical acquisition  technique, axial, coronal and sagittal CT images from the skull base  to  the vertex were obtained without intravenous contrast.   (topogram) image(s) also obtained and reviewed.    FINDINGS:  No intracranial hemorrhage, mass effect, or midline shift. No acute  loss of gray-white matter differentiation in the cerebral hemispheres.  Ventricles are proportionate to the cerebral sulci. Clear basal  cisterns. 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     The bony calvaria and the bones of the skull base are normal. The  visualized portions of the paranasal sinuses and mastoid air cells are  clear. Grossly normal orbits. Bilateral pseudophakia. Vascular  calcifications.      Impression    IMPRESSION:   1. No acute intracranial pathology.   2. 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    I have personally reviewed the examination and initial interpretation  and I agree with the findings.    MEGHANN FAJARDO MD         SYSTEM ID:  [id number]   CT Cervical Spine w/o Contrast    Narrative    EXAM: CT CERVICAL SPINE W/O CONTRAST  3/11/2023 5:18 PM     HISTORY:  fall       COMPARISON:  None available.    TECHNIQUE: Using multidetector thin collimation helical acquisition  technique, axial, coronal and sagittal CT images through the cervical  spine were obtained without intravenous contrast.    FINDINGS:  Trace anterolisthesis of C3 on C4. No acute fracture or traumatic  subluxation. Multilevel disc space narrowing, which is most notable at  C4-5, C5-6 and C6-7. Ankylosis of the right C3-4 facets. Disc  osteophyte complex at C4-5 and C5-6 likely resulting in mild spinal  canal stenosis at C4-5. Moderate right neural foraminal narrowing at  C3-4, otherwise no high-grade neural foraminal narrowing. No  prevertebral edema. No abnormality of the paraspinous soft tissues.  Atrophic appearance of the right thyroid lobe.      Impression    IMPRESSION:  1. No acute fracture or traumatic subluxation.  2. Multilevel cervical spondylosis, which is greatest at C4-5, C5-6  and C6-7. No high-grade spinal canal or neural  foraminal narrowing.    I have personally reviewed the examination and initial interpretation  and I agree with the findings.    MEGHANN FAJARDO MD         SYSTEM ID:  [id number]   Symptomatic Influenza A/B, RSV, & SARS-CoV2 PCR (COVID-19) Nasopharyngeal    Specimen: Nasopharyngeal; Swab   Result Value Ref Range    Influenza A PCR Negative Negative    Influenza B PCR Negative Negative    RSV PCR Negative Negative    SARS CoV2 PCR Negative Negative    Narrative    Testing was performed using the Xpert Xpress CoV2/Flu/RSV Assay on the Cepheid GeneXpert Instrument. This test should be ordered for the detection of SARS-CoV-2, influenza, and RSV viruses in individuals who meet clinical and/or epidemiological criteria. Test performance is unknown in asymptomatic patients. This test is for in vitro diagnostic use under the FDA EUA for laboratories certified under CLIA to perform high or moderate complexity testing. This test has not been FDA cleared or approved. A negative result does not rule out the presence of PCR inhibitors in the specimen or target RNA in concentration below the limit of detection for the assay. If only one viral target is positive but coinfection with multiple targets is suspected, the sample should be re-tested with another FDA cleared, approved, or authorized test, if coinfection would change clinical management. This test was validated by the [facility]. These laboratories are certified under the Clinical Laboratory Improvement Amendments of 1988 (CLIA-88) as qualified to perform high complexity laboratory testing.   XR Chest 2 Views    Narrative    EXAM: XR CHEST 2 VIEWS  3/11/2023 5:23 PM      HISTORY: fall, weakness    COMPARISON: None available.    FINDINGS: Two views of the chest. No significant tracheal deviation.  Normal cardiomediastinal silhouette. No pleural effusion,  consolidation or pneumothorax. No acute osseous abnormalities.  Atherosclerotic calcification of the  aortic knob. Scoliotic curvature  of the thoracic spine.      Impression    IMPRESSION: No focal pulmonary opacity.    I have personally reviewed the examination and initial interpretation  and I agree with the findings.    DERRICK RAMOS MD         SYSTEM ID:  [id number]   UA with Microscopic reflex to Culture    Specimen: Urine, Midstream   Result Value Ref Range    Color Urine Yellow Colorless, Straw, Light Yellow, Yellow    Appearance Urine Slightly Cloudy (A) Clear    Glucose Urine Negative Negative mg/dL    Bilirubin Urine Negative Negative    Ketones Urine 10 (A) Negative mg/dL    Specific Gravity Urine 1.026 1.003 - 1.035    Blood Urine Negative Negative    pH Urine 5.0 5.0 - 7.0    Protein Albumin Urine 20 (A) Negative mg/dL    Urobilinogen Urine Normal Normal, 2.0 mg/dL    Nitrite Urine Negative Negative    Leukocyte Esterase Urine Moderate (A) Negative    Bacteria Urine Moderate (A) None Seen /HPF    Mucus Urine Present (A) None Seen /LPF    RBC Urine 2 <=2 /HPF    WBC Urine 17 (H) <=5 /HPF    Squamous Epithelials Urine 1 <=1 /HPF    Transitional Epithelials Urine 1 <=1 /HPF    Hyaline Casts Urine 38 (H) <=2 /LPF    Narrative    Urine Culture ordered based on laboratory criteria   Troponin T, High Sensitivity   Result Value Ref Range    Troponin T, High Sensitivity 38 (H) <=14 ng/L   Urine Culture    Specimen: Urine, Midstream   Result Value Ref Range    Culture 50,000-100,000 CFU/mL Mixture of urogenital magi    CRP inflammation   Result Value Ref Range    CRP Inflammation <3.00 <5.00 mg/L   EKG 12-lead, complete   Result Value Ref Range    Systolic Blood Pressure  mmHg    Diastolic Blood Pressure  mmHg    Ventricular Rate 76 BPM    Atrial Rate 76 BPM    VT Interval 154 ms    QRS Duration 90 ms     ms    QTc 429 ms    P Axis 44 degrees    R AXIS 0 degrees    T Axis 28 degrees    Interpretation ECG       Sinus rhythm  Normal ECG  When compared with ECG of 11-MAR-2023 16:41,  (unconfirmed)  Criteria for Septal infarct are no longer Present     CBC with Platelets & Differential    Narrative    The following orders were created for panel order CBC with Platelets & Differential.  Procedure                               Abnormality         Status                     ---------                               -----------         ------                     CBC with platelets and d...[[id number]]  Abnormal            Final result                 Please view results for these tests on the individual orders.   Troponin T, High Sensitivity   Result Value Ref Range    Troponin T, High Sensitivity 41 (H) <=14 ng/L   Comprehensive metabolic panel   Result Value Ref Range    Sodium 141 136 - 145 mmol/L    Potassium 4.4 3.4 - 5.3 mmol/L    Chloride 115 (H) 98 - 107 mmol/L    Carbon Dioxide (CO2) 19 (L) 22 - 29 mmol/L    Anion Gap 7 7 - 15 mmol/L    Urea Nitrogen 30.3 (H) 8.0 - 23.0 mg/dL    Creatinine 0.95 0.51 - 0.95 mg/dL    Calcium 8.6 (L) 8.8 - 10.2 mg/dL    Glucose 87 70 - 99 mg/dL    Alkaline Phosphatase 69 35 - 104 U/L    AST 20 10 - 35 U/L    ALT 16 10 - 35 U/L    Protein Total 5.1 (L) 6.4 - 8.3 g/dL    Albumin 3.1 (L) 3.5 - 5.2 g/dL    Bilirubin Total 0.3 <=1.2 mg/dL    GFR Estimate 60 (L) >60 mL/min/1.73m2   CRP inflammation   Result Value Ref Range    CRP Inflammation <3.00 <5.00 mg/L   CBC with platelets and differential   Result Value Ref Range    WBC Count 5.3 4.0 - 11.0 10e3/uL    RBC Count 3.90 3.80 - 5.20 10e6/uL    Hemoglobin 11.1 (L) 11.7 - 15.7 g/dL    Hematocrit 36.1 35.0 - 47.0 %    MCV 93 78 - 100 fL    MCH 28.5 26.5 - 33.0 pg    MCHC 30.7 (L) 31.5 - 36.5 g/dL    RDW 13.5 10.0 - 15.0 %    Platelet Count 142 (L) 150 - 450 10e3/uL    % Neutrophils 58 %    % Lymphocytes 27 %    % Monocytes 12 %    % Eosinophils 2 %    % Basophils 1 %    % Immature Granulocytes 0 %    NRBCs per 100 WBC 0 <1 /100    Absolute Neutrophils 3.1 1.6 - 8.3 10e3/uL    Absolute Lymphocytes 1.4 0.8 - 5.3 10e3/uL     Absolute Monocytes 0.6 0.0 - 1.3 10e3/uL    Absolute Eosinophils 0.1 0.0 - 0.7 10e3/uL    Absolute Basophils 0.1 0.0 - 0.2 10e3/uL    Absolute Immature Granulocytes 0.0 <=0.4 10e3/uL    Absolute NRBCs 0.0 10e3/uL   XR Knee Port Right 1/2 Views    Narrative    EXAM: XR KNEE PORT RIGHT 1/2 VIEWS  LOCATION: [facility]  DATE/TIME: 3/12/2023 12:41 PM    INDICATION: Increased knee pain after a fall.  COMPARISON: None.      Impression    IMPRESSION: Anatomic alignment right knee. No acute displaced right knee fracture identified. Small right knee joint effusion. Patellar spurring. Arterial calcification.   XR Knee Port Left 1/2 Views    Narrative    EXAM: XR KNEE PORTABLE LEFT 1/2 VIEWS  LOCATION: [facility]  DATE/TIME: 03/12/2023, 12:42 PM    INDICATION: Increased knee pain after a fall.  COMPARISON: None.      Impression    IMPRESSION: Anatomic alignment left knee. No acute displaced left knee fracture. No left knee joint effusion. Arterial calcification. No significant anterior knee soft tissue swelling.         Impression:  1. Alzheimers dementia.  2. Repeated visits for AMS and physical decline associated with dehydration. She needs ongoing frequent reminders to consume fluids.  3. Pyuria. No symptoms other than AMS. Would complete a 5 day course of antibiotic.  4. Stiff gait associated with knee osteoarthritis. Recommend walker for gait stability.  5. OK to discharge back to prior living situation with daughter this morning.    Mikael Trevizo MD

## 2023-03-13 NOTE — CONSULTS
.  Care Management Initial Consult    General Information  Assessment completed with: Children,  (daughter Alex)  Type of CM/SW Visit: Initial Assessment    Primary Care Provider verified and updated as needed: Yes (Jenae Sveta Patiñone [phone] 1 [address] [address])   Readmission within the last 30 days: no previous admission in last 30 days      Reason for Consult: discharge planning  Advance Care Planning: Advance Care Planning Reviewed: no concerns identified    Alex reported that she has her mother's POA which includes healthcare. Copy requested        Communication Assessment  Patient's communication style: spoken language (English or Bilingual)    Hearing Difficulty or Deaf: no      Cognitive  Cognitive/Neuro/Behavioral: .WDL except, orientation  Level of Consciousness: confused  Arousal Level: opens eyes spontaneously  Orientation: disoriented to, time, situation  Mood/Behavior: calm, cooperative  Best Language: 0 - No aphasia  Speech: rambling    Living Environment:   People in home: facility resident     Current living Arrangements: assisted living  Name of Facility:  ([facility])   Able to return to prior arrangements: yes    Family/Social Support:  Care provided by: other (see comments) (facility staff)  Provides care for: no one, unable/limited ability to care for self  Marital Status: Single  Children, Facility resident(s)/Staff          Description of Support System: Supportive, Involved    Support Assessment: Adequate family and caregiver support, Adequate social supports    Current Resources:   Patient receiving home care services: No     Community Resources:    Equipment currently used at home: walker, standard, grab bar, toilet, grab bar, tub/shower  Supplies currently used at home: None    Employment/Financial:  Employment Status: retired        Financial Concerns: No concerns identified   Referral to Financial Worker: No     Lifestyle & Psychosocial  Needs:  Social Determinants of Health     Tobacco Use: Low Risk      Smoking Tobacco Use: Never     Smokeless Tobacco Use: Never     Passive Exposure: Not on file   Alcohol Use: Not on file   Financial Resource Strain: Not on file   Food Insecurity: Not on file   Transportation Needs: Not on file   Physical Activity: Not on file   Stress: Not on file   Social Connections: Not on file   Intimate Partner Violence: Not on file   Depression: Not on file   Housing Stability: Not on file     Functional Status:  Prior to admission patient needed assistance:   Dependent ADLs:: Ambulation-walker, Bathing  Dependent IADLs:: Cleaning, Cooking, Laundry, Shopping, Meal Preparation, Medication Management, Money Management, Transportation     Mental Health Status:  Mental Health Status: No Current Concerns       Chemical Dependency Status:  Chemical Dependency Status: No Current Concerns           Values/Beliefs:  Spiritual, Cultural Beliefs, Congregation Practices, Values that affect care:            Values/Beliefs Comment:  (Kim is Worship)    Additional Information:    Kim Ybarra is a 82 year old female admitted on 3/11/2023. She has a past medical history of Alzheimer's dementia, cerebral amyloid angiopathy, HTN, HLD, osteoporosis, osteoarthritis who presented to the [facility] ED via EMS after being found down in her [facility] suite.     Care Management/Social Work Consult placed for discharge planning. HEIDE performed chart review to begin assessment    1015 Due to Kim's history of dementia, HEIDE called her daughter Alex ([phone]) to complete assessment. HEIDE introduced self and explained the reason for the visit. Alex agreed to speak with HEIDE    Kim lives in a care suite at [facility] ([address] [address]). She uses a walker to ambulate around the facility. Alex reports that Kim's cognitive abilities have been declining so she has engaged additional services for her mother.  " Staff accompany her to/from the dining room and help her bathe 3x/week, as well as checking in with her several times daily; the facility provides all housekeeping and laundry services and medication management. Although staff will assist her with dressing, Kim usually dresses/undresses without assistance. Alex reports that she and her sister (who lives out of state) share their mother's POA and that it includes being her HCA. HEIDE requested a copy of the POA/HCD. Alex also manages Kim's finances.    Alex reported that Kim has a history of UTIs, and when they occur, she often falls, as well. When this happens, she is usually hospitalized for 2-3 days for a lot of testing. Alex expressed concerns about whether it was time to increase her mother's level of care. She reported that the facility is not a \"locked unit\" and that if her mother should leave it or wander out of it, they would need to move her to a memory care unit which her facility does not have.  HEIDE offered several resources for LTC/memory care search, which Alex accepted. HEIDE confirmed Alex's email ([email or web address]).  No additional questions or concerns were reported. HEIDE provided availability and contact information and the call was ended.    4951 HEIDE called nurse line [phone] and spoke with SHAY Uribe. SW provided update that pt was able to return to facility today and that her daughter would be transporting her [phone]. LPN asked that discharge orders be faxed to (Fax: [phone])    1118 Discharge orders faxed to facility via [facility]    SW will continue to follow as needed.    SHAHRZAD Prakash, [facility]  ED/OBS   [facility]  Phone: [phone]  Pager: [phone]  Fax: [phone]     On-call pager, [phone], 4:00 pm to midnight            "

## 2023-03-13 NOTE — DISCHARGE SUMMARY
[facility]  ED Observation Discharge Summary      Date of Admission:  3/11/2023  Date of Discharge:  3/13/2023  Discharging Provider: Peggy Erickson PA-C  Discharge Service: ED Observation Service    Discharge Diagnoses   Generalized weakness  Possible UTI  JAYLEN  History of gait impairment  Chronic bilateral knee pain L>R  Troponemia  Alzheimer's dementia  Cerebral amyloid angiopathy  HTN  HLD  Osteoarthritis  Osteoporosis    Follow-ups Needed After Discharge   Follow up with PCP in 1 week, repeat BMP    Unresulted Labs Ordered in the Past 30 Days of this Admission     Date and Time Order Name Status Description    3/13/2023  7:07 AM Basic metabolic panel In process         Discharge Disposition   Discharged to assisted living  Condition at discharge: Stable    Hospital Course   Kim Ybarra is a 82 year old female admitted on 3/11/2023. She has a past medical history of Alzheimer's dementia, cerebral amyloid angiopathy, HTN, HLD, osteoporosis, osteoarthritis who presented to the [facility] ED via EMS after being found down in her long-term ([facility]) suite.      # Generalized weakness  # Possible UTI  # JAYLEN, resolved  # H/o Gait Impairment  # Chronic bilateral knee pain Left >right  Patient's daughter visited with patient on 3/11 at 12:00PM, left ~ 1:30PM, received a call ~ 3:00PM stating patient was found down by her couch. Patients daughter states she sat with patient for lunch on 3/11 and noticed patients gait was very slow with her walker, dragging her feet; in retrospect patient had changed from her baseline when she saw her 2 days ago. Patient unable to report what happened. Patient was noted to be more weak and slightly altered mental status compared to her baseline. No definite head trauma.  Patient denies any pain. Per patients daughter, this is the third time patient has been seen in the ED with similar episode. [place] [facility] [place] with similar event, found  to have JAYLEN/UTI.  UA with 10 ketones, 20 protein, moderate LE, 17 WBC, moderate bacteria, 38 hyaline casts. Urine culture pending Covid 19 PCR/Flu A/B/RSV negative. Troponin HS 46 -> 38. EKG NSR. CXR negative. CT Head negative. CT C Spine reports  no acute fracture or traumatic subluxation; multilevel cervical spondylosis, greatest at C4-5, C5-6 and C6-7.  CRP <5.00 Xrays of both knees negative. Suspect patient's difficulty ambulating likely due to acute exacerbation of chronic osteoarthritis pain.     Creatinine improved to 0.9 from 1.7 with the help of IV fluids. Patient encouraged to stay well hydrated. Prescribed Omnicef on discharge for possible UTI. Should follow up with PCP in 1 week with repeat BMP. She does have a walker at home for her bad knees.     # Troponemia: Troponin HS 46 -> 38. EKG NSR. Denies chest pain. Previously elevated at previous admission. EKG normal. No events on telemetry.     # Alzheimer's dementia  # Cerebral Amyloid Angiopathy  Patient was seen at  Neurology clinic 6/8/22. Noted MoCA 15/30. FAQ= 28 and met criteria for dementia. She was started on Aricept 6/8/22 however patients daughter states this was discontinued as there was no noticeable benefit considering side effects patient was experiencing.  - Follow up with PCP    # HTN  - Continue PTA Losartan      # HLD  - Continue PTA Simvastatin 40mg daily     # Osteoarthritis   -Continue Tylenol 1000mg BID     # Osteoporosis: No known fractures. Per primary Geriatrician note, discussed possible Dexa and consideration of repeat treatment as she failed previous bisphosphonate d/t gastritis but unclear benefit after initial treatment, more if she is very high risk for fracture. Per primary, Dexa to be considered if continues to have falls. Ordered Vitamin D level but not done yet.      Consultations This Hospital Stay   CARE MANAGEMENT / SOCIAL WORK IP CONSULT  PHYSICAL THERAPY ADULT IP CONSULT  OCCUPATIONAL THERAPY ADULT IP  CONSULT    Code Status   No CPR- Do NOT Intubate    Time Spent on this Encounter   I, Peggy Erickson PA-C, personally saw the patient today and spent greater than 30 minutes discharging this patient.     Peggy Erickson PA-C  [facility] [address] [address]  Phone: [phone]  Fax: [phone]  ______________________________________________________________________    Physical Exam   Vital Signs: Temp: 98.5  F (36.9  C) Temp src: Oral BP: (!) 152/81 Pulse: 79   Resp: 16 SpO2: 96 % O2 Device: None (Room air)    Weight: 147 lbs 14.86 oz  Exam:  Constitutional: alert, no distress, and cooperative  Head: normocephalic, atraumatic  Neck: no asymmetry, masses, or scars  ENT: throat normal without erythema or exudate  Cardiovascular: RRR  Respiratory: diminished, respirations unlabored  Gastrointestinal: (+) BS, non tender, soft  Musculoskeletal: normal muscle tone, no pitting edema  Skin: no suspicious lesions or rashes  Neurologic: oriented to self and knew she was in the hospital but not which, disoriented to date, moves all extremities, no slurred speech  Psychiatric: normal affect and mood, forgetful    Primary Care Physician   Provider Not In System    Discharge Orders      Reason for your hospital stay    You were hospitalized for generalized weakness and knee pain. There were no fractures noted on imaging. You were treated for a possible urinary tract infection with antibiotics, and we have prescribed you Omnicef on discharge. You were dehydrated and given IV fluids with improvement of your kidney function. Please stay well hydrated at home.     Activity    Your activity upon discharge: activity as tolerated     Adult [facility] Follow-up and recommended labs and tests    Follow up with primary care provider, Provider Not In System, within 7 days for hospital follow- up. Repeat BMP.    Appointments on [place] and/or [facility]  (with [facility] or [facility] provider or service). Call [phone] if you haven't heard regarding these appointments within 7 days of discharge.     When to contact your care team    Return to the ED for burning with urination, flank pain, fevers, nausea/vomiting, new/acute worsening confusion, fall with injury, or any other new or worsening problems.     Diet    Follow this diet upon discharge: Regular       Significant Results and Procedures   Results for orders placed or performed during the hospital encounter of 03/11/23   CT Head w/o Contrast    Narrative    EXAM: CT HEAD W/O CONTRAST  3/11/2023 5:17 PM     HISTORY:  fall       COMPARISON:  None available.    TECHNIQUE: Using multidetector thin collimation helical acquisition  technique, axial, coronal and sagittal CT images from the skull base  to the vertex were obtained without intravenous contrast.   (topogram) image(s) also obtained and reviewed.    FINDINGS:  No intracranial hemorrhage, mass effect, or midline shift. No acute  loss of gray-white matter differentiation in the cerebral hemispheres.  Ventricles are proportionate to the cerebral sulci. Clear basal  cisterns. 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     The bony calvaria and the bones of the skull base are normal. The  visualized portions of the paranasal sinuses and mastoid air cells are  clear. Grossly normal orbits. Bilateral pseudophakia. Vascular  calcifications.      Impression    IMPRESSION:   1. No acute intracranial pathology.   2. 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    I have personally reviewed the examination and initial interpretation  and I agree with the findings.    MEGHANN FAJARDO MD         SYSTEM ID:  [id number]   CT Cervical Spine w/o Contrast    Narrative    EXAM: CT CERVICAL SPINE W/O CONTRAST  3/11/2023 5:18 PM     HISTORY:  fall       COMPARISON:  None available.    TECHNIQUE: Using multidetector thin collimation helical acquisition  technique, axial, coronal and sagittal CT images  through the cervical  spine were obtained without intravenous contrast.    FINDINGS:  Trace anterolisthesis of C3 on C4. No acute fracture or traumatic  subluxation. Multilevel disc space narrowing, which is most notable at  C4-5, C5-6 and C6-7. Ankylosis of the right C3-4 facets. Disc  osteophyte complex at C4-5 and C5-6 likely resulting in mild spinal  canal stenosis at C4-5. Moderate right neural foraminal narrowing at  C3-4, otherwise no high-grade neural foraminal narrowing. No  prevertebral edema. No abnormality of the paraspinous soft tissues.  Atrophic appearance of the right thyroid lobe.      Impression    IMPRESSION:  1. No acute fracture or traumatic subluxation.  2. Multilevel cervical spondylosis, which is greatest at C4-5, C5-6  and C6-7. No high-grade spinal canal or neural foraminal narrowing.    I have personally reviewed the examination and initial interpretation  and I agree with the findings.    MEGHANN FAJARDO MD         SYSTEM ID:  [id number]   XR Chest 2 Views    Narrative    EXAM: XR CHEST 2 VIEWS  3/11/2023 5:23 PM      HISTORY: fall, weakness    COMPARISON: None available.    FINDINGS: Two views of the chest. No significant tracheal deviation.  Normal cardiomediastinal silhouette. No pleural effusion,  consolidation or pneumothorax. No acute osseous abnormalities.  Atherosclerotic calcification of the aortic knob. Scoliotic curvature  of the thoracic spine.      Impression    IMPRESSION: No focal pulmonary opacity.    I have personally reviewed the examination and initial interpretation  and I agree with the findings.    DERRICK RAMOS MD         SYSTEM ID:  [id number]   XR Knee Port Left 1/2 Views    Narrative    EXAM: XR KNEE PORTABLE LEFT 1/2 VIEWS  LOCATION: [facility]  DATE/TIME: 03/12/2023, 12:42 PM    INDICATION: Increased knee pain after a fall.  COMPARISON: None.      Impression    IMPRESSION: Anatomic alignment left knee. No acute  displaced left knee fracture. No left knee joint effusion. Arterial calcification. No significant anterior knee soft tissue swelling.       XR Knee Port Right 1/2 Views    Narrative    EXAM: XR KNEE PORT RIGHT 1/2 VIEWS  LOCATION: [facility]  DATE/TIME: 3/12/2023 12:41 PM    INDICATION: Increased knee pain after a fall.  COMPARISON: None.      Impression    IMPRESSION: Anatomic alignment right knee. No acute displaced right knee fracture identified. Small right knee joint effusion. Patellar spurring. Arterial calcification.       Discharge Medications   Current Discharge Medication List      START taking these medications    Details   cefdinir (OMNICEF) 300 MG capsule Take 1 capsule (300 mg) by mouth 2 times daily  Qty: 6 capsule, Refills: 0    Associated Diagnoses: Urinary tract infection without hematuria, site unspecified         CONTINUE these medications which have NOT CHANGED    Details   Calcium Polycarbophil (FIBER) 625 MG tablet Take 625 mg by mouth daily      losartan (COZAAR) 50 MG tablet Take 50 mg by mouth daily      simvastatin (ZOCOR) 80 MG tablet Take 40 mg by mouth daily      multivitamin w/minerals (CERTAVITE/ANTIOXIDANTS) tablet Take 1 tablet by mouth daily           Allergies   Allergies   Allergen Reactions     Other Food Allergy Unknown     Beef

## 2023-03-13 NOTE — PLAN OF CARE
Outpatient/Observation goals to be met before discharge home:  Goal Outcome Evaluation:  -diagnostic tests and consults completed and resulted. Met    -vital signs normal or at patient baseline. Met.  -tolerating oral intake to maintain hydration. In process, IVF NS infusing @ 125 mL/hr, tolerating regular diet  -adequate pain control on oral analgesics. Met, pain well managed on scheduled Tylenol  -tolerating oral antibiotics or has plans for home infusion setup. In progress, pt on IV Rocephin Q24hrs  -infection is improving. In progress   -returns to baseline functional status. Not met   -safe disposition plan has been identified. In progress    -OT/PT evaluation completed. Not met

## 2023-03-13 NOTE — PLAN OF CARE
Outpatient/Observation goals to be met before discharge home:  Goal Outcome Evaluation:  -diagnostic tests and consults completed and resulted. Not met    -vital signs normal or at patient baseline. Met.  /72 (BP Location: Left arm)   Pulse 85   Temp 98.5  F (36.9  C) (Oral)   Resp 16   Wt 67.1 kg (147 lb 14.9 oz)   SpO2 96%   -tolerating oral intake to maintain hydration. In process, IVF NS infusing @ 125 mL/hr, tolerating po fluids  -adequate pain control on oral analgesics. Met, pain well managed on scheduled Tylenol  -tolerating oral antibiotics or has plans for home infusion setup. In progress, pt on IV Rocephin Q24hrs  -infection is improving. In progress   -returns to baseline functional status. Not met , ambulated to bathroom /c Assist x 1, gait belt and walker. Pt slow to move, shuffles feet, needs repeat cuing and reminders.   -safe disposition plan has been identified. In progress    -OT/PT evaluation completed. Not met, walker ordered and at bedside

## 2023-03-13 NOTE — PROGRESS NOTES
Discharge instruction reviewed with daughter.  Patient and daughter verbalized understanding. PIV removed, patient ambulated to w/c then assisted the main lobby. Family awaiting at main lobby. Patient discharged with Rx Antibiotic and all belongings.

## 2023-03-14 ENCOUNTER — PATIENT OUTREACH (OUTPATIENT)
Dept: CARE COORDINATION | Facility: CLINIC | Age: 83
End: 2023-03-14
Payer: COMMERCIAL

## 2023-03-14 NOTE — PROGRESS NOTES
Contact   Chart Review     Situation: Patient chart reviewed by .    Background: Patient discharged from hospital and Care Coordination referral was placed at discharge.     Assessment: Patient lives in assisted living facility.    Plan/Recommendations: Per protocol, no outreach made.   Luz Morgan,   [facility]  [phone]

## 2023-03-14 NOTE — PLAN OF CARE
Physical Therapy Discharge Summary    Reason for therapy discharge:    Discharged to Assisted living facility    Progress towards therapy goal(s). See goals on Care Plan in [facility] electronic health record for goal details.  Goals partially met.  Barriers to achieving goals:   discharge from facility.    Therapy recommendation(s):    No further therapy is recommended.

## 2023-04-07 ENCOUNTER — TRANSCRIBE ORDERS (OUTPATIENT)
Dept: OTHER | Age: 83
End: 2023-04-07

## 2023-04-07 DIAGNOSIS — C44.319 BASAL CELL CARCINOMA (BCC) OF SKIN OF OTHER PART OF FACE: Primary | ICD-10-CM

## 2023-04-10 ENCOUNTER — TELEPHONE (OUTPATIENT)
Dept: DERMATOLOGY | Facility: CLINIC | Age: 83
End: 2023-04-10
Payer: COMMERCIAL

## 2023-04-10 NOTE — TELEPHONE ENCOUNTER
Left patient a voicemail to schedule a consult & mohs for   BCC of L central cheek    with Dr. Cole. Provided my direct phone number.    Chanel Cooleyied on 4/10/2023 at 11:57 AM

## 2023-04-12 NOTE — TELEPHONE ENCOUNTER
Called patient to schedule surgery with Dr. Cole    Date of Surgery: 06/15    Surgery type: mohs    Consult scheduled: Yes    Has patient had mohs with us before? No    Additional comments: hu Bosch on 4/12/2023 at 10:38 AM

## 2023-04-26 NOTE — TELEPHONE ENCOUNTER
FUTURE VISIT INFORMATION      FUTURE VISIT INFORMATION:    Date: 6.7.23    Time: 1:45    Location: Telephone  REFERRAL INFORMATION:    Referring provider:  Juan    Referring providers clinic:  [facility]    Reason for visit/diagnosis  BCC of L central cheek    RECORDS REQUESTED FROM:       Clinic name Comments Records Status Imaging Status   [facility] 3.31.23  Path # [id number] CE Received

## 2023-05-20 ENCOUNTER — APPOINTMENT (OUTPATIENT)
Dept: GENERAL RADIOLOGY | Facility: CLINIC | Age: 83
DRG: 481 | End: 2023-05-20
Attending: EMERGENCY MEDICINE
Payer: COMMERCIAL

## 2023-05-20 ENCOUNTER — APPOINTMENT (OUTPATIENT)
Dept: CT IMAGING | Facility: CLINIC | Age: 83
DRG: 481 | End: 2023-05-20
Attending: EMERGENCY MEDICINE
Payer: COMMERCIAL

## 2023-05-20 ENCOUNTER — HOSPITAL ENCOUNTER (INPATIENT)
Facility: CLINIC | Age: 83
LOS: 24 days | Discharge: HOME HEALTH | DRG: 481 | End: 2023-06-13
Attending: EMERGENCY MEDICINE | Admitting: ORTHOPAEDIC SURGERY
Payer: COMMERCIAL

## 2023-05-20 ENCOUNTER — ANESTHESIA EVENT (OUTPATIENT)
Dept: SURGERY | Facility: CLINIC | Age: 83
DRG: 481 | End: 2023-05-20
Payer: COMMERCIAL

## 2023-05-20 ENCOUNTER — ANESTHESIA (OUTPATIENT)
Dept: SURGERY | Facility: CLINIC | Age: 83
DRG: 481 | End: 2023-05-20
Payer: COMMERCIAL

## 2023-05-20 ENCOUNTER — APPOINTMENT (OUTPATIENT)
Dept: GENERAL RADIOLOGY | Facility: CLINIC | Age: 83
DRG: 481 | End: 2023-05-20
Attending: ORTHOPAEDIC SURGERY
Payer: COMMERCIAL

## 2023-05-20 DIAGNOSIS — E78.5 HYPERLIPIDEMIA LDL GOAL <100: ICD-10-CM

## 2023-05-20 DIAGNOSIS — S72.002A HIP FRACTURE, LEFT, CLOSED, INITIAL ENCOUNTER (H): ICD-10-CM

## 2023-05-20 DIAGNOSIS — Z11.52 ENCOUNTER FOR SCREENING LABORATORY TESTING FOR SEVERE ACUTE RESPIRATORY SYNDROME CORONAVIRUS 2 (SARS-COV-2): ICD-10-CM

## 2023-05-20 DIAGNOSIS — I10 HYPERTENSION, UNSPECIFIED TYPE: Primary | ICD-10-CM

## 2023-05-20 LAB
ABO/RH(D): NORMAL
ALBUMIN SERPL BCG-MCNC: 3.9 G/DL (ref 3.5–5.2)
ALP SERPL-CCNC: 76 U/L (ref 35–104)
ALT SERPL W P-5'-P-CCNC: 33 U/L (ref 10–35)
ANION GAP SERPL CALCULATED.3IONS-SCNC: 13 MMOL/L (ref 7–15)
ANTIBODY SCREEN: NEGATIVE
AST SERPL W P-5'-P-CCNC: 25 U/L (ref 10–35)
BASOPHILS # BLD AUTO: 0 10E3/UL (ref 0–0.2)
BASOPHILS NFR BLD AUTO: 1 %
BILIRUB DIRECT SERPL-MCNC: <0.2 MG/DL (ref 0–0.3)
BILIRUB SERPL-MCNC: 0.3 MG/DL
BUN SERPL-MCNC: 20.3 MG/DL (ref 8–23)
CALCIUM SERPL-MCNC: 9.8 MG/DL (ref 8.8–10.2)
CHLORIDE SERPL-SCNC: 120 MMOL/L (ref 98–107)
CK SERPL-CCNC: 83 U/L (ref 26–192)
CREAT SERPL-MCNC: 1.02 MG/DL (ref 0.51–0.95)
DEPRECATED HCO3 PLAS-SCNC: 22 MMOL/L (ref 22–29)
EOSINOPHIL # BLD AUTO: 0.1 10E3/UL (ref 0–0.7)
EOSINOPHIL NFR BLD AUTO: 2 %
ERYTHROCYTE [DISTWIDTH] IN BLOOD BY AUTOMATED COUNT: 13.4 % (ref 10–15)
GFR SERPL CREATININE-BSD FRML MDRD: 55 ML/MIN/1.73M2
GLUCOSE SERPL-MCNC: 97 MG/DL (ref 70–99)
HCT VFR BLD AUTO: 41.5 % (ref 35–47)
HGB BLD-MCNC: 13.4 G/DL (ref 11.7–15.7)
IMM GRANULOCYTES # BLD: 0.1 10E3/UL
IMM GRANULOCYTES NFR BLD: 1 %
INR PPP: 1.1 (ref 0.85–1.15)
LYMPHOCYTES # BLD AUTO: 1.3 10E3/UL (ref 0.8–5.3)
LYMPHOCYTES NFR BLD AUTO: 24 %
MAGNESIUM SERPL-MCNC: 2.1 MG/DL (ref 1.7–2.3)
MCH RBC QN AUTO: 28.6 PG (ref 26.5–33)
MCHC RBC AUTO-ENTMCNC: 32.3 G/DL (ref 31.5–36.5)
MCV RBC AUTO: 89 FL (ref 78–100)
MONOCYTES # BLD AUTO: 0.5 10E3/UL (ref 0–1.3)
MONOCYTES NFR BLD AUTO: 10 %
NEUTROPHILS # BLD AUTO: 3.5 10E3/UL (ref 1.6–8.3)
NEUTROPHILS NFR BLD AUTO: 62 %
NRBC # BLD AUTO: 0 10E3/UL
NRBC BLD AUTO-RTO: 0 /100
PHOSPHATE SERPL-MCNC: 3 MG/DL (ref 2.5–4.5)
PLATELET # BLD AUTO: 165 10E3/UL (ref 150–450)
POTASSIUM SERPL-SCNC: 4.3 MMOL/L (ref 3.4–5.3)
PROT SERPL-MCNC: 6 G/DL (ref 6.4–8.3)
RBC # BLD AUTO: 4.69 10E6/UL (ref 3.8–5.2)
SARS-COV-2 RNA RESP QL NAA+PROBE: NEGATIVE
SODIUM SERPL-SCNC: 140 MMOL/L (ref 136–145)
SODIUM SERPL-SCNC: 140 MMOL/L (ref 136–145)
SODIUM SERPL-SCNC: 141 MMOL/L (ref 136–145)
SODIUM SERPL-SCNC: 155 MMOL/L (ref 136–145)
SPECIMEN EXPIRATION DATE: NORMAL
WBC # BLD AUTO: 5.6 10E3/UL (ref 4–11)

## 2023-05-20 PROCEDURE — 72125 CT NECK SPINE W/O DYE: CPT | Mod: 26 | Performed by: RADIOLOGY

## 2023-05-20 PROCEDURE — 27235 TREAT THIGH FRACTURE: CPT | Mod: LT | Performed by: ORTHOPAEDIC SURGERY

## 2023-05-20 PROCEDURE — 999N000141 HC STATISTIC PRE-PROCEDURE NURSING ASSESSMENT: Performed by: ORTHOPAEDIC SURGERY

## 2023-05-20 PROCEDURE — 83735 ASSAY OF MAGNESIUM: CPT | Performed by: PHYSICIAN ASSISTANT

## 2023-05-20 PROCEDURE — 99223 1ST HOSP IP/OBS HIGH 75: CPT

## 2023-05-20 PROCEDURE — 272N000002 HC OR SUPPLY OTHER OPNP: Performed by: ORTHOPAEDIC SURGERY

## 2023-05-20 PROCEDURE — 99285 EMERGENCY DEPT VISIT HI MDM: CPT | Mod: 25

## 2023-05-20 PROCEDURE — 250N000009 HC RX 250: Performed by: NURSE ANESTHETIST, CERTIFIED REGISTERED

## 2023-05-20 PROCEDURE — 85610 PROTHROMBIN TIME: CPT | Performed by: EMERGENCY MEDICINE

## 2023-05-20 PROCEDURE — 258N000003 HC RX IP 258 OP 636: Performed by: NURSE ANESTHETIST, CERTIFIED REGISTERED

## 2023-05-20 PROCEDURE — 99285 EMERGENCY DEPT VISIT HI MDM: CPT | Mod: 25 | Performed by: EMERGENCY MEDICINE

## 2023-05-20 PROCEDURE — 71250 CT THORAX DX C-: CPT | Mod: 26 | Performed by: STUDENT IN AN ORGANIZED HEALTH CARE EDUCATION/TRAINING PROGRAM

## 2023-05-20 PROCEDURE — 36415 COLL VENOUS BLD VENIPUNCTURE: CPT | Performed by: PHYSICIAN ASSISTANT

## 2023-05-20 PROCEDURE — 70450 CT HEAD/BRAIN W/O DYE: CPT

## 2023-05-20 PROCEDURE — 86850 RBC ANTIBODY SCREEN: CPT | Performed by: EMERGENCY MEDICINE

## 2023-05-20 PROCEDURE — 82248 BILIRUBIN DIRECT: CPT | Performed by: PHYSICIAN ASSISTANT

## 2023-05-20 PROCEDURE — 250N000009 HC RX 250: Performed by: ORTHOPAEDIC SURGERY

## 2023-05-20 PROCEDURE — 0QH734Z INSERTION OF INTERNAL FIXATION DEVICE INTO LEFT UPPER FEMUR, PERCUTANEOUS APPROACH: ICD-10-PCS | Performed by: ORTHOPAEDIC SURGERY

## 2023-05-20 PROCEDURE — 999N000180 XR SURGERY CARM FLUORO LESS THAN 5 MIN

## 2023-05-20 PROCEDURE — 370N000017 HC ANESTHESIA TECHNICAL FEE, PER MIN: Performed by: ORTHOPAEDIC SURGERY

## 2023-05-20 PROCEDURE — 36415 COLL VENOUS BLD VENIPUNCTURE: CPT | Performed by: EMERGENCY MEDICINE

## 2023-05-20 PROCEDURE — 99221 1ST HOSP IP/OBS SF/LOW 40: CPT | Mod: 57 | Performed by: ORTHOPAEDIC SURGERY

## 2023-05-20 PROCEDURE — 710N000010 HC RECOVERY PHASE 1, LEVEL 2, PER MIN: Performed by: ORTHOPAEDIC SURGERY

## 2023-05-20 PROCEDURE — C1713 ANCHOR/SCREW BN/BN,TIS/BN: HCPCS | Performed by: ORTHOPAEDIC SURGERY

## 2023-05-20 PROCEDURE — 93010 ELECTROCARDIOGRAM REPORT: CPT | Performed by: EMERGENCY MEDICINE

## 2023-05-20 PROCEDURE — 250N000025 HC SEVOFLURANE, PER MIN: Performed by: ORTHOPAEDIC SURGERY

## 2023-05-20 PROCEDURE — 250N000013 HC RX MED GY IP 250 OP 250 PS 637: Performed by: EMERGENCY MEDICINE

## 2023-05-20 PROCEDURE — 272N000001 HC OR GENERAL SUPPLY STERILE: Performed by: ORTHOPAEDIC SURGERY

## 2023-05-20 PROCEDURE — 250N000011 HC RX IP 250 OP 636: Performed by: STUDENT IN AN ORGANIZED HEALTH CARE EDUCATION/TRAINING PROGRAM

## 2023-05-20 PROCEDURE — 360N000084 HC SURGERY LEVEL 4 W/ FLUORO, PER MIN: Performed by: ORTHOPAEDIC SURGERY

## 2023-05-20 PROCEDURE — 85004 AUTOMATED DIFF WBC COUNT: CPT | Performed by: EMERGENCY MEDICINE

## 2023-05-20 PROCEDURE — 93005 ELECTROCARDIOGRAM TRACING: CPT

## 2023-05-20 PROCEDURE — 71250 CT THORAX DX C-: CPT

## 2023-05-20 PROCEDURE — 87635 SARS-COV-2 COVID-19 AMP PRB: CPT | Performed by: EMERGENCY MEDICINE

## 2023-05-20 PROCEDURE — 73502 X-RAY EXAM HIP UNI 2-3 VIEWS: CPT | Mod: 26 | Performed by: RADIOLOGY

## 2023-05-20 PROCEDURE — 72125 CT NECK SPINE W/O DYE: CPT

## 2023-05-20 PROCEDURE — 70450 CT HEAD/BRAIN W/O DYE: CPT | Mod: 26 | Performed by: RADIOLOGY

## 2023-05-20 PROCEDURE — 84295 ASSAY OF SERUM SODIUM: CPT | Performed by: PHYSICIAN ASSISTANT

## 2023-05-20 PROCEDURE — C1894 INTRO/SHEATH, NON-LASER: HCPCS | Performed by: ORTHOPAEDIC SURGERY

## 2023-05-20 PROCEDURE — 74176 CT ABD & PELVIS W/O CONTRAST: CPT | Mod: 26 | Performed by: STUDENT IN AN ORGANIZED HEALTH CARE EDUCATION/TRAINING PROGRAM

## 2023-05-20 PROCEDURE — 80053 COMPREHEN METABOLIC PANEL: CPT | Performed by: EMERGENCY MEDICINE

## 2023-05-20 PROCEDURE — 84100 ASSAY OF PHOSPHORUS: CPT | Performed by: PHYSICIAN ASSISTANT

## 2023-05-20 PROCEDURE — 73700 CT LOWER EXTREMITY W/O DYE: CPT | Mod: 26 | Performed by: RADIOLOGY

## 2023-05-20 PROCEDURE — 250N000011 HC RX IP 250 OP 636: Performed by: NURSE ANESTHETIST, CERTIFIED REGISTERED

## 2023-05-20 PROCEDURE — 96360 HYDRATION IV INFUSION INIT: CPT

## 2023-05-20 PROCEDURE — 258N000003 HC RX IP 258 OP 636: Performed by: EMERGENCY MEDICINE

## 2023-05-20 PROCEDURE — 73502 X-RAY EXAM HIP UNI 2-3 VIEWS: CPT

## 2023-05-20 PROCEDURE — 73700 CT LOWER EXTREMITY W/O DYE: CPT | Mod: LT

## 2023-05-20 PROCEDURE — 82550 ASSAY OF CK (CPK): CPT | Performed by: EMERGENCY MEDICINE

## 2023-05-20 PROCEDURE — 120N000002 HC R&B MED SURG/OB UMMC

## 2023-05-20 DEVICE — IMPLANTABLE DEVICE: Type: IMPLANTABLE DEVICE | Site: HIP | Status: FUNCTIONAL

## 2023-05-20 RX ORDER — LIDOCAINE 40 MG/G
CREAM TOPICAL
Status: DISCONTINUED | OUTPATIENT
Start: 2023-05-20 | End: 2023-06-13 | Stop reason: HOSPADM

## 2023-05-20 RX ORDER — FLUMAZENIL 0.1 MG/ML
0.2 INJECTION, SOLUTION INTRAVENOUS
Status: DISCONTINUED | OUTPATIENT
Start: 2023-05-20 | End: 2023-05-20 | Stop reason: HOSPADM

## 2023-05-20 RX ORDER — AMOXICILLIN 250 MG
1 CAPSULE ORAL 2 TIMES DAILY PRN
Status: DISCONTINUED | OUTPATIENT
Start: 2023-05-20 | End: 2023-06-13 | Stop reason: HOSPADM

## 2023-05-20 RX ORDER — BUPIVACAINE HYDROCHLORIDE 2.5 MG/ML
INJECTION, SOLUTION EPIDURAL; INFILTRATION; INTRACAUDAL
Status: COMPLETED | OUTPATIENT
Start: 2023-05-20 | End: 2023-05-20

## 2023-05-20 RX ORDER — NALOXONE HYDROCHLORIDE 0.4 MG/ML
0.4 INJECTION, SOLUTION INTRAMUSCULAR; INTRAVENOUS; SUBCUTANEOUS
Status: DISCONTINUED | OUTPATIENT
Start: 2023-05-20 | End: 2023-05-20 | Stop reason: HOSPADM

## 2023-05-20 RX ORDER — PROPOFOL 10 MG/ML
INJECTION, EMULSION INTRAVENOUS PRN
Status: DISCONTINUED | OUTPATIENT
Start: 2023-05-20 | End: 2023-05-20

## 2023-05-20 RX ORDER — SODIUM CHLORIDE, SODIUM LACTATE, POTASSIUM CHLORIDE, CALCIUM CHLORIDE 600; 310; 30; 20 MG/100ML; MG/100ML; MG/100ML; MG/100ML
INJECTION, SOLUTION INTRAVENOUS CONTINUOUS
Status: DISCONTINUED | OUTPATIENT
Start: 2023-05-20 | End: 2023-05-20 | Stop reason: HOSPADM

## 2023-05-20 RX ORDER — DEXAMETHASONE SODIUM PHOSPHATE 4 MG/ML
INJECTION, SOLUTION INTRA-ARTICULAR; INTRALESIONAL; INTRAMUSCULAR; INTRAVENOUS; SOFT TISSUE PRN
Status: DISCONTINUED | OUTPATIENT
Start: 2023-05-20 | End: 2023-05-20

## 2023-05-20 RX ORDER — CEFAZOLIN SODIUM/WATER 2 G/20 ML
SYRINGE (ML) INTRAVENOUS PRN
Status: DISCONTINUED | OUTPATIENT
Start: 2023-05-20 | End: 2023-05-20

## 2023-05-20 RX ORDER — ASPIRIN 81 MG/1
162 TABLET, CHEWABLE ORAL DAILY
Status: DISCONTINUED | OUTPATIENT
Start: 2023-05-21 | End: 2023-06-13 | Stop reason: HOSPADM

## 2023-05-20 RX ORDER — NALOXONE HYDROCHLORIDE 0.4 MG/ML
0.2 INJECTION, SOLUTION INTRAMUSCULAR; INTRAVENOUS; SUBCUTANEOUS
Status: DISCONTINUED | OUTPATIENT
Start: 2023-05-20 | End: 2023-06-13 | Stop reason: HOSPADM

## 2023-05-20 RX ORDER — POLYETHYLENE GLYCOL 3350 17 G/17G
17 POWDER, FOR SOLUTION ORAL DAILY PRN
Status: DISCONTINUED | OUTPATIENT
Start: 2023-05-20 | End: 2023-06-13 | Stop reason: HOSPADM

## 2023-05-20 RX ORDER — SIMVASTATIN 40 MG
40 TABLET ORAL AT BEDTIME
Status: DISCONTINUED | OUTPATIENT
Start: 2023-05-21 | End: 2023-06-13 | Stop reason: HOSPADM

## 2023-05-20 RX ORDER — FENTANYL CITRATE 50 UG/ML
INJECTION, SOLUTION INTRAMUSCULAR; INTRAVENOUS PRN
Status: DISCONTINUED | OUTPATIENT
Start: 2023-05-20 | End: 2023-05-20

## 2023-05-20 RX ORDER — NALOXONE HYDROCHLORIDE 0.4 MG/ML
0.4 INJECTION, SOLUTION INTRAMUSCULAR; INTRAVENOUS; SUBCUTANEOUS
Status: DISCONTINUED | OUTPATIENT
Start: 2023-05-20 | End: 2023-06-13 | Stop reason: HOSPADM

## 2023-05-20 RX ORDER — HYDROMORPHONE HYDROCHLORIDE 1 MG/ML
0.2 INJECTION, SOLUTION INTRAMUSCULAR; INTRAVENOUS; SUBCUTANEOUS EVERY 5 MIN PRN
Status: DISCONTINUED | OUTPATIENT
Start: 2023-05-20 | End: 2023-05-20 | Stop reason: HOSPADM

## 2023-05-20 RX ORDER — ACETAMINOPHEN 325 MG/1
650 TABLET ORAL EVERY 6 HOURS PRN
Status: DISCONTINUED | OUTPATIENT
Start: 2023-05-20 | End: 2023-06-13 | Stop reason: HOSPADM

## 2023-05-20 RX ORDER — MAGNESIUM HYDROXIDE 1200 MG/15ML
LIQUID ORAL PRN
Status: DISCONTINUED | OUTPATIENT
Start: 2023-05-20 | End: 2023-05-20 | Stop reason: HOSPADM

## 2023-05-20 RX ORDER — ONDANSETRON 4 MG/1
4 TABLET, ORALLY DISINTEGRATING ORAL EVERY 6 HOURS PRN
Status: DISCONTINUED | OUTPATIENT
Start: 2023-05-20 | End: 2023-06-13 | Stop reason: HOSPADM

## 2023-05-20 RX ORDER — ONDANSETRON 2 MG/ML
4 INJECTION INTRAMUSCULAR; INTRAVENOUS EVERY 30 MIN PRN
Status: DISCONTINUED | OUTPATIENT
Start: 2023-05-20 | End: 2023-05-20 | Stop reason: HOSPADM

## 2023-05-20 RX ORDER — ONDANSETRON 2 MG/ML
4 INJECTION INTRAMUSCULAR; INTRAVENOUS EVERY 6 HOURS PRN
Status: DISCONTINUED | OUTPATIENT
Start: 2023-05-20 | End: 2023-06-13 | Stop reason: HOSPADM

## 2023-05-20 RX ORDER — FENTANYL CITRATE 50 UG/ML
50 INJECTION, SOLUTION INTRAMUSCULAR; INTRAVENOUS EVERY 5 MIN PRN
Status: DISCONTINUED | OUTPATIENT
Start: 2023-05-20 | End: 2023-05-20 | Stop reason: HOSPADM

## 2023-05-20 RX ORDER — LOSARTAN POTASSIUM 50 MG/1
50 TABLET ORAL DAILY
Status: DISCONTINUED | OUTPATIENT
Start: 2023-05-20 | End: 2023-05-27

## 2023-05-20 RX ORDER — HYDROMORPHONE HCL IN WATER/PF 6 MG/30 ML
0.2 PATIENT CONTROLLED ANALGESIA SYRINGE INTRAVENOUS
Status: DISCONTINUED | OUTPATIENT
Start: 2023-05-20 | End: 2023-06-06

## 2023-05-20 RX ORDER — ONDANSETRON 2 MG/ML
INJECTION INTRAMUSCULAR; INTRAVENOUS PRN
Status: DISCONTINUED | OUTPATIENT
Start: 2023-05-20 | End: 2023-05-20

## 2023-05-20 RX ORDER — NALOXONE HYDROCHLORIDE 0.4 MG/ML
0.2 INJECTION, SOLUTION INTRAMUSCULAR; INTRAVENOUS; SUBCUTANEOUS
Status: DISCONTINUED | OUTPATIENT
Start: 2023-05-20 | End: 2023-05-20 | Stop reason: HOSPADM

## 2023-05-20 RX ORDER — CEFAZOLIN SODIUM 2 G/100ML
2 INJECTION, SOLUTION INTRAVENOUS EVERY 8 HOURS
Status: COMPLETED | OUTPATIENT
Start: 2023-05-21 | End: 2023-05-21

## 2023-05-20 RX ORDER — FENTANYL CITRATE 50 UG/ML
25-50 INJECTION, SOLUTION INTRAMUSCULAR; INTRAVENOUS
Status: DISCONTINUED | OUTPATIENT
Start: 2023-05-20 | End: 2023-05-20 | Stop reason: HOSPADM

## 2023-05-20 RX ORDER — ONDANSETRON 4 MG/1
4 TABLET, ORALLY DISINTEGRATING ORAL EVERY 30 MIN PRN
Status: DISCONTINUED | OUTPATIENT
Start: 2023-05-20 | End: 2023-05-20 | Stop reason: HOSPADM

## 2023-05-20 RX ORDER — ACETAMINOPHEN 325 MG/1
650 TABLET ORAL ONCE
Status: COMPLETED | OUTPATIENT
Start: 2023-05-20 | End: 2023-05-20

## 2023-05-20 RX ORDER — LIDOCAINE HYDROCHLORIDE 20 MG/ML
INJECTION, SOLUTION INFILTRATION; PERINEURAL PRN
Status: DISCONTINUED | OUTPATIENT
Start: 2023-05-20 | End: 2023-05-20

## 2023-05-20 RX ORDER — SODIUM CHLORIDE, SODIUM LACTATE, POTASSIUM CHLORIDE, CALCIUM CHLORIDE 600; 310; 30; 20 MG/100ML; MG/100ML; MG/100ML; MG/100ML
INJECTION, SOLUTION INTRAVENOUS CONTINUOUS PRN
Status: DISCONTINUED | OUTPATIENT
Start: 2023-05-20 | End: 2023-05-20

## 2023-05-20 RX ORDER — SODIUM CHLORIDE 450 MG/100ML
INJECTION, SOLUTION INTRAVENOUS CONTINUOUS
Status: DISCONTINUED | OUTPATIENT
Start: 2023-05-20 | End: 2023-05-20

## 2023-05-20 RX ORDER — AMOXICILLIN 250 MG
2 CAPSULE ORAL 2 TIMES DAILY PRN
Status: DISCONTINUED | OUTPATIENT
Start: 2023-05-20 | End: 2023-06-13 | Stop reason: HOSPADM

## 2023-05-20 RX ORDER — HYDROMORPHONE HYDROCHLORIDE 1 MG/ML
0.4 INJECTION, SOLUTION INTRAMUSCULAR; INTRAVENOUS; SUBCUTANEOUS EVERY 5 MIN PRN
Status: DISCONTINUED | OUTPATIENT
Start: 2023-05-20 | End: 2023-05-20 | Stop reason: HOSPADM

## 2023-05-20 RX ORDER — FENTANYL CITRATE 50 UG/ML
25 INJECTION, SOLUTION INTRAMUSCULAR; INTRAVENOUS EVERY 5 MIN PRN
Status: DISCONTINUED | OUTPATIENT
Start: 2023-05-20 | End: 2023-05-20 | Stop reason: HOSPADM

## 2023-05-20 RX ORDER — SODIUM CHLORIDE, SODIUM LACTATE, POTASSIUM CHLORIDE, CALCIUM CHLORIDE 600; 310; 30; 20 MG/100ML; MG/100ML; MG/100ML; MG/100ML
INJECTION, SOLUTION INTRAVENOUS CONTINUOUS
Status: DISCONTINUED | OUTPATIENT
Start: 2023-05-20 | End: 2023-05-21

## 2023-05-20 RX ADMIN — ACETAMINOPHEN 650 MG: 325 TABLET, FILM COATED ORAL at 11:14

## 2023-05-20 RX ADMIN — PHENYLEPHRINE HYDROCHLORIDE 100 MCG: 10 INJECTION INTRAVENOUS at 20:20

## 2023-05-20 RX ADMIN — Medication 40 MG: at 20:15

## 2023-05-20 RX ADMIN — PHENYLEPHRINE HYDROCHLORIDE 100 MCG: 10 INJECTION INTRAVENOUS at 20:41

## 2023-05-20 RX ADMIN — SODIUM CHLORIDE 1000 ML: 9 INJECTION, SOLUTION INTRAVENOUS at 09:09

## 2023-05-20 RX ADMIN — Medication 2 G: at 20:05

## 2023-05-20 RX ADMIN — BUPIVACAINE HYDROCHLORIDE 20 ML: 2.5 INJECTION, SOLUTION EPIDURAL; INFILTRATION; INTRACAUDAL at 06:09

## 2023-05-20 RX ADMIN — PHENYLEPHRINE HYDROCHLORIDE 100 MCG: 10 INJECTION INTRAVENOUS at 20:28

## 2023-05-20 RX ADMIN — DEXAMETHASONE SODIUM PHOSPHATE 4 MG: 4 INJECTION, SOLUTION INTRA-ARTICULAR; INTRALESIONAL; INTRAMUSCULAR; INTRAVENOUS; SOFT TISSUE at 20:42

## 2023-05-20 RX ADMIN — SUGAMMADEX 140 MG: 100 INJECTION, SOLUTION INTRAVENOUS at 21:23

## 2023-05-20 RX ADMIN — SODIUM CHLORIDE, POTASSIUM CHLORIDE, SODIUM LACTATE AND CALCIUM CHLORIDE: 600; 310; 30; 20 INJECTION, SOLUTION INTRAVENOUS at 20:02

## 2023-05-20 RX ADMIN — PHENYLEPHRINE HYDROCHLORIDE 100 MCG: 10 INJECTION INTRAVENOUS at 20:53

## 2023-05-20 RX ADMIN — SODIUM CHLORIDE, POTASSIUM CHLORIDE, SODIUM LACTATE AND CALCIUM CHLORIDE: 600; 310; 30; 20 INJECTION, SOLUTION INTRAVENOUS at 21:05

## 2023-05-20 RX ADMIN — PHENYLEPHRINE HYDROCHLORIDE 200 MCG: 10 INJECTION INTRAVENOUS at 20:25

## 2023-05-20 RX ADMIN — PHENYLEPHRINE HYDROCHLORIDE 0.3 MCG/KG/MIN: 10 INJECTION INTRAVENOUS at 20:21

## 2023-05-20 RX ADMIN — LIDOCAINE HYDROCHLORIDE 60 MG: 20 INJECTION, SOLUTION INFILTRATION; PERINEURAL at 20:15

## 2023-05-20 RX ADMIN — FENTANYL CITRATE 25 MCG: 50 INJECTION, SOLUTION INTRAMUSCULAR; INTRAVENOUS at 21:02

## 2023-05-20 RX ADMIN — FENTANYL CITRATE 50 MCG: 50 INJECTION, SOLUTION INTRAMUSCULAR; INTRAVENOUS at 20:15

## 2023-05-20 RX ADMIN — PHENYLEPHRINE HYDROCHLORIDE 100 MCG: 10 INJECTION INTRAVENOUS at 21:20

## 2023-05-20 RX ADMIN — PROPOFOL 150 MG: 10 INJECTION, EMULSION INTRAVENOUS at 20:15

## 2023-05-20 RX ADMIN — ONDANSETRON 4 MG: 2 INJECTION INTRAMUSCULAR; INTRAVENOUS at 21:26

## 2023-05-20 RX ADMIN — PHENYLEPHRINE HYDROCHLORIDE 100 MCG: 10 INJECTION INTRAVENOUS at 20:22

## 2023-05-20 ASSESSMENT — ACTIVITIES OF DAILY LIVING (ADL)
ADLS_ACUITY_SCORE: 35
ADLS_ACUITY_SCORE: 39
ADLS_ACUITY_SCORE: 35

## 2023-05-20 NOTE — ED TRIAGE NOTES
Pt BIBA from memory care unit w/ c/o fall. Per ems, pt found on floor, unable to get up on own. Pt c/o left hip pain. No shortening, rotation. Pt reports that she was getting dressed when feet slipped from under her. Denies LOC

## 2023-05-20 NOTE — LETTER
Health Information Management Services               Recipient:  [facility]          Sender:  DANIA Blanco  [phone]          Date: June 13, 2023  Patient Name:  Kim Ybarra  Patient YOB: 1940  Routing Message:  UPDATED discharge orders for D. H.          The documents accompanying this notice contain confidential information belonging to the sender.  This information is intended only for the use of the individual or entity named above.  The authorized recipient of this information is prohibited from disclosing this information to any other party and is required to destroy the information after its stated need has been fulfilled, unless otherwise required by state law.      If you are not the intended recipient, you are hereby notified that any disclosure, copy, distribution or action taken in reliance on the contents of these documents is strictly prohibited.  If you have received this document in error, please return it by fax to [phone] with a note on the cover sheet explaining why you are returning it (e.g. not your patient, not your provider, etc.).  If you need further assistance, please call [facility] Centralized Transcription at [phone].  Documents may also be returned by mail to [employer], , [facility] [address] [place], [place] [address].

## 2023-05-20 NOTE — CONSULTS
[facility]  Orthopedic Surgery Consult    Name: Kim Ybarra  Age: 83 year old  MRN: [id number]  YOB: 1940    Reason for Consult: L hip pain    Requesting Provider: Dr. Kauffman    Assessment and Plan:     Assessment:  83-year-old female with closed left valgus impacted femoral neck fracture.    PATIENT IS DNR. NO COMPRESSIONS IF INTRA-OP CARDIAC ARREST.     Medical Decision Making:  Risks and benefits of percutaneous fixation with cannulated screws, versus arthroplasty option discussed at length.  After thorough discussion regarding the risks and benefits of the various procedures, the patient's daughter (POA) elected to proceed with percutaneous fixation with cannulated screws of her left valgus impacted femoral neck fracture.    Plan:  Percutaneous screw fixation L hip      -NPO  -Strict Bedrest  -Type and Screen, INR  -Hold anticoagulation  -Fascia Iliaca block if patient having severe pain     [facility]&P/clearance/cardiac optimization appreciated  Transfer to [facility]    Staff: MD Phillip Rey MD  Orthopedic Surgery PGY4  [phone]    Please page me directly with any questions/concerns during regular weekday hours before 5 pm. If there is no response, if it is a weekend, or if it is during evening hours then please page the orthopedic surgery resident on call.    History of Present Illness:     Patient was seen and examined by me. History, PMH, Meds, SH, complete ROS (10 organ systems) and PE reviewed with patient and prior medical records.      83-year-old female, brought in by ambulance from her memory care unit.  Patient sustained a ground-level fall.  This was unwitnessed, and the patient was found on the floor.  The patient reports that she was getting dressed, when her feet slipped out from underneath her.    Patient denies any head strike, loss of consciousness, or pain/injury elsewhere.    Patient is DNR, would  "like to continue DNR status during surgery.    Daughter present. She is POA.     Patient lives in assisted living. Walks with Walker at baseline. No hip pain prior.     NPO 9-10AM, [place], water.      Past Medical History:     Past Medical History:   Diagnosis Date     Poor historian        Past Surgical History:     History reviewed. No pertinent surgical history.    Social History:     Social History     Socioeconomic History     Marital status: Single     Spouse name: None     Number of children: None     Years of education: None     Highest education level: None   Tobacco Use     Smoking status: Never     Smokeless tobacco: Never   Substance and Sexual Activity     Alcohol use: Not Currently     Drug use: Never       Family History:     History reviewed. No pertinent family history.    Medications:     No current facility-administered medications for this encounter.     Current Outpatient Medications   Medication Sig     Calcium Polycarbophil (FIBER) 625 MG tablet Take 625 mg by mouth daily     cefdinir (OMNICEF) 300 MG capsule Take 1 capsule (300 mg) by mouth 2 times daily     losartan (COZAAR) 50 MG tablet Take 50 mg by mouth daily     multivitamin w/minerals (CERTAVITE/ANTIOXIDANTS) tablet Take 1 tablet by mouth daily     simvastatin (ZOCOR) 80 MG tablet Take 40 mg by mouth daily       Allergies:     Allergies   Allergen Reactions     Other Food Allergy Unknown     Beef       Review of Systems:     A comprehensive 10 point review of systems (constitutional, ENT, cardiac, peripheral vascular, respiratory, GI, , musculoskeletal, skin, neurological) was performed and found to be negative except as described in this note.      Physical Exam:     Vital Signs: BP (!) 170/80   Pulse 64   Temp 98.2  F (36.8  C) (Oral)   Resp 16   Ht 1.626 m (5' 4\")   Wt 61.2 kg (135 lb)   LMP  (LMP Unknown)   SpO2 95%   BMI 23.17 kg/m    General: Resting comfortably in bed, awake, alert, cooperative, no apparent distress, " appears stated age.  HEENT: Normocephalic, atraumatic, EOMI, no scleral icterus.  Cardiovascular: RRR assessed by peripheral pulse.  Respiratory: Breathing comfortably on room air, no wheezing.  Skin: No rashes or lesions.  Neurologic: A&Ox3, CN II-XII grossly intact  Musculoskeletal:  RUE: No gross deformity. Skin intact. Full active/passive ROM w/o pain or crepitus. Fires deltoid, biceps, triceps, wrist extension/flexion, , EPL, intrinsics, FPL with 5/5 strength. SILT in axillary, musculocutaneous, radial, ulnar, and median nerve distribution. Radial pulse 2+ and fingers warm and well-perfused with <2 seconds capillary refill.  RLE: No gross deformity. Skin intact. Full active/passive ROM of all joints w/o pain or crepitus. 5/5 SLR. Fires TA/Gastroc/EHL/FHL with 5/5 strength. SILT in femoral, sural, saphenous, deep peroneal, superficial peroneal, and tibial nerve distributions. Dorsalis pedis and posterior tibial arteries 2+ and foot warm and well-perfused with <2 seconds capillary refill.  LUE: No gross deformity. Skin intact. Full active/passive ROM w/o pain or crepitus. Fires deltoid, biceps, triceps, wrist extension/flexion, , EPL, intrinsics, FPL with 5/5 strength. SILT in axillary, musculocutaneous, radial, ulnar, and median nerve distribution. Radial pulse 2+ and fingers warm and well-perfused with <2 seconds capillary refill.  LLE: No gross deformity. Skin intact.  Pain with logroll, groin, left hip.  Full range of motion of the left hip deferred secondary to known injury.  Full active/passive ROM of knee/ankle w/o pain or crepitus. 5/5 SLR. Fires TA/Gastroc/EHL/FHL with 5/5 strength. SILT in femoral, sural, saphenous, deep peroneal, superficial peroneal, and tibial nerve distributions. Dorsalis pedis and posterior tibial arteries 2+ and foot warm and well-perfused with <2 seconds capillary refill.     Imaging:     CT scan available for my independent review demonstrates left valgus impacted  femoral neck fracture.  No other acute fracture or dislocation    AP pelvis, crosstable lateral of the left hip available for my dependent review demonstrates valgus impacted left femoral neck fracture.  On crosstable lateral, no evidence of angulation >20 degrees on lateral radiograph.    Data:     CBC:  Lab Results   Component Value Date    WBC 5.6 05/20/2023    HGB 13.4 05/20/2023     05/20/2023     BMP:  Lab Results   Component Value Date     (H) 05/20/2023    POTASSIUM 4.3 05/20/2023    CHLORIDE 120 (H) 05/20/2023    CO2 22 05/20/2023    BUN 20.3 05/20/2023    CR 1.02 (H) 05/20/2023    ANIONGAP 13 05/20/2023    SUZY 9.8 05/20/2023    GLC 97 05/20/2023     Inflammatory Markers:  Lab Results   Component Value Date    WBC 5.6 05/20/2023    WBC 5.3 03/12/2023    WBC 9.7 03/11/2023

## 2023-05-20 NOTE — ED PROVIDER NOTES
"ED Provider Note  [facility]      History     Chief Complaint   Patient presents with     Fall     HPI  Kim Ybarra is a 83 year old female with PMH significant for Alzheimer's dementia, cerebral amyloid angiopathy, HTN, HLD, osteoporosis, and osteoarthritis who presents to the ED via EMS after a fall in her memory care unit. Per EMS patient found down by staff, unable to get up. Patient reports she was getting dressed when her feet slipped causing her to fall over. She believes she was on the floor for \"only a few minutes\". She denies LOC. She is complaining of left hip pain. No shortening or rotation noted. She otherwise denies confusion, headache, neck pain, abdomoinal pain, or back pian.    Past Medical History  Past Medical History:   Diagnosis Date     Poor historian      History reviewed. No pertinent surgical history.  Calcium Polycarbophil (FIBER) 625 MG tablet  cefdinir (OMNICEF) 300 MG capsule  losartan (COZAAR) 50 MG tablet  multivitamin w/minerals (CERTAVITE/ANTIOXIDANTS) tablet  simvastatin (ZOCOR) 80 MG tablet      Allergies   Allergen Reactions     Other Food Allergy Unknown     Beef     Family History  History reviewed. No pertinent family history.  Social History   Social History     Tobacco Use     Smoking status: Never     Smokeless tobacco: Never   Substance Use Topics     Alcohol use: Not Currently     Drug use: Never         A medically appropriate review of systems was performed with pertinent positives and negatives noted in the HPI, and all other systems negative.    Physical Exam      Physical Exam  Vitals reviewed.   Constitutional:       General: She is not in acute distress.     Appearance: Normal appearance. She is normal weight. She is not ill-appearing.   HENT:      Head: Normocephalic and atraumatic.      Right Ear: External ear normal.      Left Ear: External ear normal.      Nose: Nose normal.      Mouth/Throat:      Mouth: Mucous membranes are moist. "   Eyes:      Extraocular Movements: Extraocular movements intact.      Pupils: Pupils are equal, round, and reactive to light.   Cardiovascular:      Rate and Rhythm: Normal rate and regular rhythm.      Pulses: Normal pulses.   Pulmonary:      Effort: Pulmonary effort is normal. No respiratory distress.   Abdominal:      General: Abdomen is flat. There is no distension.      Palpations: Abdomen is soft.      Tenderness: There is no abdominal tenderness.   Musculoskeletal:      Cervical back: Normal range of motion.      Comments: Moderate tenderness over left greater trochanter region.  No clear deformity.  No rotational deformity of the left lower extremity.   Skin:     Findings: No bruising or erythema.   Neurological:      Mental Status: She is alert.           ED Course, Procedures, & Data     ED Course as of 05/20/23 1236   Sat May 20, 2023   0928 CK Total: 83   1031 On reevaluation, patient remains in no acute distress.  Discussed with daughter regarding plan, and she agrees that if imaging is otherwise unremarkable she is comfortable with patient returning to her care facility.   1038 Discussed case with radiology, there is concern of possible left hip fracture.  They recommend left hip CT for further evaluation.  We will order this and reevaluate.   1227 Repeat CT of left hip shows mildly impacted femoral neck fracture.  Discussed this case with orthopedics who plan to transfer her to the [facility] for surgical evaluation.  Patient and family updated.     Procedures                 Results for orders placed or performed during the hospital encounter of 05/20/23   CBC with platelets differential     Status: None (In process)    Narrative    The following orders were created for panel order CBC with platelets differential.  Procedure                               Abnormality         Status                     ---------                               -----------         ------                     CBC with  platelets and d...[[id number]]                      In process                   Please view results for these tests on the individual orders.     Medications - No data to display  Labs Ordered and Resulted from Time of ED Arrival to Time of ED Departure - No data to display  CT Head w/o Contrast    (Results Pending)   CT Cervical Spine w/o Contrast    (Results Pending)   CT Chest w/o Contrast    (Results Pending)   CT Abdomen Pelvis w/o Contrast    (Results Pending)          Critical care was not performed.     Medical Decision Making  The patient's presentation was of straightforward complexity (a clearly self-limited or minor problem).    The patient's evaluation involved:  history and exam without other MDM data elements    The patient's management necessitated only low risk treatment.      Assessment & Plan    Pleasant 83-year-old female presenting for evaluation of unwitnessed fall at her nursing facility.  Patient states she was trying to get out of bed and slipped on the floor, landing on her left side.  Denies hitting her head.  Patient cannot confirm or deny how long she was on the floor for work.  Currently complaining of left hip pain.  Denies chest pain, back pain, headache, or neck pain.    Due to unwitnessed nature of fall, will obtain CT scan to assess for any other possible injuries.  We will continue to reassess as results return.  Patient denies any headache, chest pain, or syncopal leg symptoms prior to the fall.  She remembers the entire event.    12:37 PM  Initial CT showed no intracranial or abdominal pathology.  Discussed with radiologist regarding left hip, and he recommended a dedicated hip CT.  This revealed a left fracture of the femoral neck.  Discussed case with orthopedics who agreed for transfer to [facility] for surgical evaluation.  Patient and family updated.  Patient provided Tylenol, not complaining of any significant pain at this point.    I have reviewed the nursing notes. I have  reviewed the findings, diagnosis, plan and need for follow up with the patient.    New Prescriptions    No medications on file       Final diagnoses:   None       DO MINERVA Dykes [facility] EMERGENCY DEPARTMENT  5/20/2023

## 2023-05-20 NOTE — ANESTHESIA PROCEDURE NOTES
"Femoral Procedure Note    Pre-Procedure   Staff -        Anesthesiologist:  Tom Alcala DO       Performed By: anesthesiologist       Procedure Start/Stop Times: 5/20/2023 6:09 AM and 5/20/2023 6:12 AM       Pre-Anesthestic Checklist: patient identified, IV checked, site marked, risks and benefits discussed, informed consent, monitors and equipment checked, pre-op evaluation, at physician/surgeon's request and post-op pain management  Timeout:       Correct Patient: Yes        Correct Procedure: Yes        Correct Site: Yes        Correct Position: Yes        Correct Laterality: Yes        Site Marked: Yes  Procedure Documentation  Procedure: Femoral       Diagnosis: POST OP PAIN       Laterality: left       Patient Position: supine       Skin prep: Chloraprep       Needle Type: short bevel       Needle Gauge: 21.        Needle Length (millimeters): 100        Ultrasound guided       1. Ultrasound was used to identify targeted nerve, plexus, vascular marker, or fascial plane and place a needle adjacent to it in real-time.       2. Ultrasound was used to visualize the spread of anesthetic in close proximity to the above referenced structure.       3. A permanent image is entered into the patient's record.    Assessment/Narrative         The placement was negative for: blood aspirated, painful injection and site bleeding       Paresthesias: No.       Bolus given via needle..        Secured via.        Insertion/Infusion Method: Single Shot       Complications: none    Medication(s) Administered   Bupivacaine 0.25% PF (Infiltration) - Infiltration   20 mL - 5/20/2023 6:09:00 AM  Medication Administration Time: 5/20/2023 6:09 AM     Comments:  Left Femoral Nerve Block      FOR [facility] ([facility]) ONLY:   Pain Team Contact information: please page the Pain Team Via [facility]. Search \"Pain\". During daytime hours, please page the attending first. At night please page the resident first.      "

## 2023-05-20 NOTE — H&P
[facility]    History and Physical - Hospitalist Service, GOLD TEAM        Date of Admission:  5/20/2023    Assessment & Plan      Kim Ybarra is a 83 year old female patient with a past medical history significant for HTN, HLD, overactive bladder, GERD, basal cell carcinoma, osteoporosis, frequent falls, recurrent UTIs, cervical spondylosis, Alzheimer's dementia who presented from [facility] care assisted living to [facility] ED after a mechanical fall at home with left hip pain; found to have femoral neck fracture.    Mechanical fall  Mildly impacted subcapital left femoral neck fracture  Trauma work up in ED included CT C/A/P, CT cervical spine, CT head, CT L Hip and XR pelvis with L hip. Findings notable for mildly impacted subcapital left femoral neck fracture, small-moderate deep subcutaneous hematoma posterolateral left hip compatible with soft tissue contusion. Plan for transfer to [facility] for operative repair; pre-op hemoglobin 13.4, platelets 165, INR 1.10. No hx of coagulopathy.   - For pre-op clearance, revised cardiac index score 0 points with 3.9% 30 day risk of death, MI or cardiac arrest. Trevino perioperative risk 0.4% of MI or cardiac arrest intra-op or up to 30 days post-op.    - No medical contraindications to surgery at this time. EKG still pending. Pt scheduled for surgery in AM of 5/21.    Hypernatremia, resolved  Sodium noted to be 155 at admission in setting of poor PO and fall. She received 1L NS in ED.   - Sodium recheck: 140   - IVF while NPO, would likely choose D5 or 1/2 NS    JAYLEN  Creatinine at admission noted to be 1.02. Patient received 1L NS in ED.   - IVF hydration overnight   - Hold ARB   - Recheck BMP in am    Alzheimer's dementia   - Delirium precautions   - PT, OT and social work consults   - No longer on Aricept    HTN   - Hold PTA Cozaar in the immediate operative setting. Consider resume in 1-2 days.    HLD   - Continue PTA  statin    Lung nodules  Few sub 6mm pulmonary nodules noted incidentally on CT C/A/P at admission.   - Will need repeat Chest CT in 12 months    Fluid attenuation anterior mediastinum  Noted incidentally on CT C/A/P at admission.   - Consider MR Chest for better characterization once able to reliably remain still and once femoral fracture repaired.       Diet: NPO for Medical/Clinical Reasons Except for: Meds  DVT Prophylaxis: Pneumatic Compression Devices pre-op  Dooley Catheter: Not present  Lines: None     Cardiac Monitoring: None  Code Status: No CPR- Do NOT Intubate      Clinically Significant Risk Factors Present on Admission         # Hypernatremia: Highest Na = 155 mmol/L in last 2 days, will monitor as appropriate          # Hypertension: Home medication list includes antihypertensive(s)               Disposition Plan      Expected Discharge Date: 05/22/2023                The patient's care was discussed with the Attending Physician, Dr. Menjivar.    Isauro Coleman PA-C  Hospitalist Service, [facility]  Securely message with [employer] (more info)  Text page via [facility] Paging/Directory   See signed in provider for up to date coverage information    ______________________________________________________________________    Chief Complaint   Fall, L hip fracture    History of Present Illness   Kim Ybarra is an 83 year old female patient with a past medical history significant for HTN, HLD, overactive bladder, GERD, basal cell carcinoma, osteoporosis, frequent falls, recurrent UTIs, cervical spondylosis, Alzheimer's dementia who presented from memory care assisted living to [facility] ED after a mechanical fall at home with left hip pain; found to have femoral neck fracture.    Patient daughter, Alex, who is medical POA present and provided collateral history. Regarding medical history, other than HTN, pt has no other cardiac history (heart failure,  MI) and no pulmonary disease. Pt did smoke historically, but quit over 40 years ago. Had previous unknown surgery on thyroid while in her 50s, otherwise no other major surgeries. No known adverse reactions to anesthesia in the patient or family members, no medication allergies, no bleeding disorders, and no fake/implanted teeth.     Patient denies chest pain, SOB, abdominal pain, urinary symptoms, and other infectious symptoms. Does endorse some urinary incontinence, which is not new.     History from patient is limited 2/2 dementia, so most history obtained from daughter and EMR.      Past Medical History    Past Medical History:   Diagnosis Date     Poor historian      Past Surgical History  Thyroid surgery  Ethel teeth extraction    Prior to Admission Medications   Prior to Admission Medications   Prescriptions Last Dose Informant Patient Reported? Taking?   Calcium Polycarbophil (FIBER) 625 MG tablet   Yes No   Sig: Take 625 mg by mouth daily   cefdinir (OMNICEF) 300 MG capsule   No No   Sig: Take 1 capsule (300 mg) by mouth 2 times daily   losartan (COZAAR) 50 MG tablet   Yes No   Sig: Take 50 mg by mouth daily   multivitamin w/minerals (CERTAVITE/ANTIOXIDANTS) tablet   Yes No   Sig: Take 1 tablet by mouth daily   simvastatin (ZOCOR) 80 MG tablet   Yes No   Sig: Take 40 mg by mouth daily      Facility-Administered Medications: None        Review of Systems    The 10 point Review of Systems is negative other than noted in the HPI or here.    Social History   I have reviewed this patient's social history and updated it with pertinent information if needed.  Social History     Tobacco Use     Smoking status: Never     Smokeless tobacco: Never   Substance Use Topics     Alcohol use: Not Currently     Drug use: Never       Allergies   Allergies   Allergen Reactions     Other Food Allergy Unknown     Beef        Physical Exam   Vital Signs: Temp: 98.5  F (36.9  C) Temp src: Oral BP: (!) 151/75 Pulse: 82   Resp: 17  SpO2: 94 % O2 Device: None (Room air)    Weight: 135 lbs 0 oz    General Appearance: Patient is lying comfortably in bed, no acute distress.   HEENT: Normocephalic, sclera anicteric. Hearing intact to normal conversational volume. No excessive throat clearing.   Respiratory: Lungs CTAB, no adventitious sounds, breathing comfortably on room air.   Cardiovascular: RRR, S1 and S2 appreciated. No murmurs, rubs, or gallops.   GI: Bowel sounds present. Abdomen is soft, non-tender, and non-distended. No guarding.  Skin: Skin without evident abnormality.   Neuro: Patient is alert and oriented to self only. No asymmetric facial features, no obvious neuro deficit on gross examination.   MSK: L hip externally rotated with patient lying supine. No other obvious joint abnormalities on gross examination.  Psych: Pleasant disposition, appropriate affect. Interactive and cooperative with provider.      Medical Decision Making       55 MINUTES SPENT BY ME on the date of service doing chart review, history, exam, documentation & further activities per the note.      Data   ------------------------- PAST 24 HR DATA REVIEWED -----------------------------------------------    I have personally reviewed the following data over the past 24 hrs:    5.6  \   13.4   / 165     140 120 (H) 20.3 /  97   4.3 22 1.02 (H) \       ALT: 33 AST: 25 AP: 76 TBILI: 0.3   ALB: 3.9 TOT PROTEIN: 6.0 (L) LIPASE: N/A       INR:  1.10 PTT:  N/A   D-dimer:  N/A Fibrinogen:  N/A       Imaging results reviewed over the past 24 hrs:   Recent Results (from the past 24 hour(s))   CT Chest Abdomen Pelvis w/o Contrast    Narrative    EXAM: Chest/abdomen/pelvis CT without contrast 5/20/2023 10:03 AM      HISTORY: Unwitnessed fall.     COMPARISON: Chest radiograph 3/11/2023. Outside hospital  chest/abdomen/pelvis CT with contrast 12/29/2022, 6/22/2021 (no images  available, only interpretation).     TECHNIQUE: Helical CT from the thoracic inlet through the  symphysis  pubis was performed without intravenous contrast. Reconstructed  coronal and sagittal images obtained. Images reviewed in soft tissue,  bone, and lung windows.    FINDINGS:    LINES/TUBES: None.    CHEST: Subcentimeter hypodense nodules largest in the right lobe of  the thyroid gland. Tortuous appearing right brachiocephalic artery  with high bifurcation. Scattered atherosclerotic calcification of the  thoracic aorta and pulmonary arteries. Fluid attenuation circumscribed  density in the anterior mediastinum representing 2.5 x 2.7 cm. (Series  11, image 80). Scattered calcification in the right hilar region.  Calcified nodule in the right lower lobe (series 11, image 56).  Normal-appearing esophagus. No pericardial effusion.    Central tracheobronchial tree is patent. No pneumothorax. Few sub-6 mm  pulmonary nodules, for example 2 mm nodule in the peripheral left  lower lobe (series 9, image 222). Subpleural reticular densities,  possibly atelectasis.    Abdomen and pelvis: Limited evaluation of abdominal parenchymal organs  due to noncontrast technique. No focal liver lesion. No calcified  gallstone. Splenic calcifications. Nodular thickening of the adrenal  glands. Scattered atherosclerotic calcification of the abdominal  aorta. No aneurysmal dilatation. Limited evaluation of the vascular  system with noncontrast technique. Limited evaluation of the pancreas  with difficulty in appreciating pancreatitis secondary to contiguous  stomach and adjacent stomach and nonopacified bowel loops. No definite  pancreatic ductal dilatation or large mass. No nephrolithiasis. Mild  prominence of the left renal pelvicalyceal system with extrarenal  pelvis. No urinary bladder calculus. Urinary bladder is distended.  Multiple punctate densities in the pelvis. Phlebolith, ureters could  not be traced. Uterine calcification likely associated with in  fibroid. No adnexal mass. Moderate colonic stool burden.  Diverticulosis.  No signs of appendicitis. No high-grade bowel  obstruction. No pneumoperitoneum or significant ascites. Mild  mesenteric streakiness.      BONES/SOFT TISSUES:    Degenerative changes in keeping with the patient's age. Transitional  lumbosacral vertebral body, with lumbarization of S1 anterolisthesis  of L5 over S1, a sclerotic focus in. Likely bone island. Grade 1  anterolisthesis of L4 and L5. Osteopenia. Mild thoracic levoscoliosis,  possibly positional. Likely impacted nondisplaced neck of femur  subcapital fracture.  Partially visualized density in the left upper  thigh in the subcutaneous fat possibly representing hematoma..      Impression    IMPRESSION:     1. Concern for nondisplaced impacted left neck of femur fracture.  Partially visualized density in the distant subcutaneous soft tissue  likely representing hematoma please refer to same date dedicated  musculoskeletal CT left hip report for further details.  2. No pneumothorax, pneumoperitoneum, or pneumoperitoneum or  pneumothorax.  3. Fluid attenuation indeterminate circumscribed density in the  anterior mediastinum, consider correlation with prior imaging and/or  MRI chest for further evaluation.  4. Mild prominence of the left renal pelvicalyceal system, no definite  proximal ureteric calculus or nephrolithiasis. Difficult to follow the  ureters with pelvic focal hypodensities favored to represent  phleboliths, if clinical concern for ureteric calculus CT urogram may  be performed  5. Sequelae of granulomatous disease as evidenced by splenic  calcification, hilar timo calcification and calcified right lower  lobe nodule.  6. Few sub-6 mm pulmonary nodule the patient is at high risk for lung  cancer, follow-up suggested the chest at 12 months may be performed as  per Fleischner Society guidelines.    Finding #1 discussed with Dr. Kauffman by Tamera at 10:30 AM 5/20/2023  11:05 AM    I have personally reviewed the examination and initial interpretation  and  I agree with the findings.    CARITO JOVEL MD         SYSTEM ID:  [id number]   CT Cervical Spine w/o Contrast    Narrative    EXAM: CT CERVICAL SPINE W/O CONTRAST  5/20/2023 10:04 AM     HISTORY: unwitnessed fall       COMPARISON: CT cervical spine 3/11/2023    TECHNIQUE: Using multidetector thin collimation helical acquisition  technique, axial, coronal and sagittal CT images through the cervical  spine were obtained without intravenous contrast.    FINDINGS:  No traumatic subluxation. No fracture. No prevertebral edema.    Stable multilevel loss of disc height at C4-5, C5-6, C6-7. Stable  trace C3-4 anterolisthesis. Stable C4-5, C5-6 and C6-7 uncovertebral  spurring, bilateral facet hypertrophy. Stable right C3-4 facet  ankylosis and facet hypertrophy causing mild to moderate right neural  foraminal stenosis. No high-grade spinal canal stenosis, or other  areas of high-grade neural foraminal stenosis.    Right upper lobe calcified pulmonary nodule better appreciated on  same-day chest abdomen and pelvis CT. No acute abnormality of the  paraspinous soft tissues.      Impression    IMPRESSION:  1. No acute fracture or traumatic subluxation.  2. Stable multilevel cervical spondylosis.    I have personally reviewed the examination and initial interpretation  and I agree with the findings.    DORITA ANGLIN MD         SYSTEM ID:  [id number]   CT Head w/o Contrast    Narrative    EXAM: Head CT without contrast 5/20/2023 10:05 AM    HISTORY: Unwitnessed fall.    COMPARISON: Head CT without contrast 3/11/2023. Outside hospital head  CT without contrast 12/29/2022, 6/22/2021 (no images available).    TECHNIQUE: Using multidetector thin collimation helical acquisition  technique, axial, coronal and sagittal CT images from the skull base  to the vertex were obtained without intravenous contrast.   (topogram) image(s) also obtained and reviewed.    FINDINGS:  There is no intracranial hemorrhage, mass effect, midline  shift, or  extra-axial fluid collection. Gray/white matter differentiation in  both cerebral hemispheres is preserved. Ventricles are proportionate  to the cerebral sulci. The basal cisterns are clear. Confluent  periventricular and subcortical white matter hypodensities, likely  representing cerebral microangiopathy given the patient's age.  Moderate generalized cerebral parenchymal volume loss, in keeping with  the patient's age. Calcifications of the fourth ventricle choroid  plexus (Bochdalek's flower basket).     The bony calvaria and the bones of the skull base are normal.  Bilateral pseudophakia. Visualized portions of the paranasal sinuses  are clear. Mastoid air cells are clear. Unremarkable soft tissues.       Impression    IMPRESSION: No acute intracranial pathology.     I have personally reviewed the examination and initial interpretation  and I agree with the findings.    DORITA ANGLIN MD         SYSTEM ID:  [id number]   CT Hip Left w/o Contrast    Narrative    EXAM: CT HIP LEFT W/O CONTRAST  LOCATION: [facility]  DATE/TIME: 5/20/2023 11:11 AM CDT    INDICATION: Left hip pain.  COMPARISON: CT abdomen and pelvis same day.  TECHNIQUE: Noncontrast. Axial, sagittal and coronal thin-section reconstruction. Dose reduction techniques were used.     FINDINGS:     BONES:  -Mildly impacted subcapital left femoral neck fracture. No acute displaced left superior or inferior pubic ramus fracture or acetabular fracture. The left pubic body is intact. Moderate left hip osteoarthritis. No aggressive bone lesion. Degenerative   change at the symphysis pubis with chondrocalcinosis.    SOFT TISSUES:  -Small to moderate-sized deep subcutaneous hematoma posterolateral left hip compatible with soft tissue contusion. Intrinsic left hip muscle bulk is normal. Arterial calcification. Left hip joint effusion. Distended urinary bladder. No bulky pelvic   adenopathy identified.       Impression    IMPRESSION:  1.  Mildly impacted subcapital left femoral neck fracture.  2.  Moderate left hip osteoarthritis.  3.  Small to moderate-sized deep subcutaneous hematoma posterolateral left hip compatible with soft tissue contusion.  4.  No left hip dislocation.    NOTE: ABNORMAL REPORT    THE DICTATION ABOVE DESCRIBES AN ABNORMALITY FOR WHICH FOLLOW-UP IS NEEDED.      XR Pelvis w Hip Left 1 View    Narrative    EXAM: XR PELVIS AND HIP LEFT 1 VIEW  LOCATION: [facility]  DATE/TIME: 5/20/2023 12:55 PM CDT    INDICATION: Left hip fracture.  COMPARISON: CT, same date.      Impression    IMPRESSION: Impacted subcapital fracture of the left femoral neck. Evaluation is somewhat limited on radiographs by external rotation of the left hip; the fracture is more easily visible on CT imaging. The trochanters are intact. No fracture in the   pelvis or right hip. Bones are demineralized. Moderate joint space narrowing in the left hip. Complete joint space narrowing in the right hip. Minimal arthritic spurring.     Recent Labs   Lab 05/20/23  1557 05/20/23  1220 05/20/23  0810   WBC  --   --  5.6   HGB  --   --  13.4   MCV  --   --  89   PLT  --   --  165   INR  --  1.10  --      --  155*   POTASSIUM  --   --  4.3   CHLORIDE  --   --  120*   CO2  --   --  22   BUN  --   --  20.3   CR  --   --  1.02*   ANIONGAP  --   --  13   SUZY  --   --  9.8   GLC  --   --  97   ALBUMIN  --   --  3.9   PROTTOTAL  --   --  6.0*   BILITOTAL  --   --  0.3   ALKPHOS  --   --  76   ALT  --   --  33   AST  --   --  25

## 2023-05-20 NOTE — LETTER
Transition Communication Hand-off for Care Transitions to Next Level of Care Provider    Name: Kim Ybarra  : 1940  MRN #: [id number]  Primary Care Provider: Sveta Emanuel     Primary Clinic: [address] [place] [id number]     Reason for Hospitalization:  Hip fracture, left, closed, initial encounter (H) [S72.002A]  Admit Date/Time: 2023  7:38 AM  Discharge Date: 23  Payor Source: Payor: [employer] / Plan: [employer]AGE / Product Type: HMO /          Reason for Communication Hand-off Referral: Continuity of Care    Discharge Plan:  Pt discharged to new facility today - [facility].    [facility]  [address] [place] [address]  Phone: [phone]   Fax: [phone]  RN to RN Report: [phone]       Concern for non-adherence with plan of care:   Y/N No  Discharge Needs Assessment:  Needs      Flowsheet Row Most Recent Value   Equipment Currently Used at Home grab bar, toilet, grab bar, tub/shower, walker, standard   # of Referrals Placed by CM --  [Home Healthcare,  [facility],  [facility]]            Already enrolled in Tele-monitoring program and name of program:  No  Follow-up specialty is recommended: no  Follow-up plan:    Future Appointments   Date Time Provider Department Center   2023  4:00 PM Lilly Rey MD [facility]       Any outstanding tests or procedures:        Referrals       Future Labs/Procedures    Home Care Referral     Comments:    Your provider has ordered home health services. If you have not been contacted within 2 days of your discharge please call the selected Home Care agency listed on your Discharge document.  If a Home Care agency is NOT listed, please call [phone].    Occupational Therapy Adult Consult     Comments:    Evaluate and treat as clinically indicated.    Reason:  weakness hip fracture    Physical Therapy Adult Consult     Comments:    Evaluate and treat as clinically  indicated.    Reason:  weakness hip fracture              Key Recommendations:      DANIA CERNA    AVS/Discharge Summary is the source of truth; this is a helpful guide for improved communication of patient story

## 2023-05-20 NOTE — ANESTHESIA PREPROCEDURE EVALUATION
Anesthesia Pre-Procedure Evaluation    Patient: Kim Ybarra   MRN: [id number] : 1940        Procedure : Procedure(s):  Closed reduction, percutaneous pinning hip          Past Medical History:   Diagnosis Date     Poor historian       History reviewed. No pertinent surgical history.   Allergies   Allergen Reactions     Other Food Allergy Unknown     Beef      Social History     Tobacco Use     Smoking status: Never     Smokeless tobacco: Never   Vaping Use     Vaping status: Not on file   Substance Use Topics     Alcohol use: Not Currently      Wt Readings from Last 1 Encounters:   23 61.2 kg (135 lb)        Anesthesia Evaluation   Pt has had prior anesthetic.     No history of anesthetic complications       ROS/MED HX  ENT/Pulmonary:       Neurologic:     (+) dementia,     Cardiovascular:     (+) Dyslipidemia hypertension----- (-) taking anticoagulants/antiplatelets and murmur   METS/Exercise Tolerance: >4 METS    Hematologic:       Musculoskeletal:   (+) arthritis,     GI/Hepatic:       Renal/Genitourinary:       Endo:       Psychiatric/Substance Use:       Infectious Disease:       Malignancy:       Other:            Physical Exam    Airway        Mallampati: I   TM distance: > 3 FB   Neck ROM: full   Mouth opening: > 3 cm    Respiratory Devices and Support         Dental     Comment: Teeth examined without any significant abnormality, patient denies loose, missing or chipped teeth or anything removable.         Cardiovascular          Rhythm and rate: regular and normal (-) no murmur    Pulmonary   pulmonary exam normal        breath sounds clear to auscultation           OUTSIDE LABS:  CBC:   Lab Results   Component Value Date    WBC 5.6 2023    WBC 5.3 2023    HGB 13.4 2023    HGB 11.1 (L) 2023    HCT 41.5 2023    HCT 36.1 2023     2023     (L) 2023     BMP:   Lab Results   Component Value Date     (H) 2023      03/13/2023    POTASSIUM 4.3 05/20/2023    POTASSIUM 4.7 03/13/2023    CHLORIDE 120 (H) 05/20/2023    CHLORIDE 112 (H) 03/13/2023    CO2 22 05/20/2023    CO2 18 (L) 03/13/2023    BUN 20.3 05/20/2023    BUN 19.0 03/13/2023    CR 1.02 (H) 05/20/2023    CR 0.85 03/13/2023    GLC 97 05/20/2023    GLC 97 03/13/2023     COAGS:   Lab Results   Component Value Date    INR 1.10 05/20/2023     POC: No results found for: BGM, HCG, HCGS  HEPATIC:   Lab Results   Component Value Date    ALBUMIN 3.1 (L) 03/12/2023    PROTTOTAL 5.1 (L) 03/12/2023    ALT 16 03/12/2023    AST 20 03/12/2023    ALKPHOS 69 03/12/2023    BILITOTAL 0.3 03/12/2023     OTHER:   Lab Results   Component Value Date    SUZY 9.8 05/20/2023    MAG 2.4 (H) 03/11/2023       Anesthesia Plan    ASA Status:  3   NPO Status:  NPO Appropriate    Anesthesia Type: General.     - Airway: ETT   Induction: Intravenous, Propofol.   Maintenance: Balanced.   Techniques and Equipment:     - Lines/Monitors: BIS     - Blood: T&S     - Drips/Meds: Phenylephrine     Consents    Anesthesia Plan(s) and associated risks, benefits, and realistic alternatives discussed. Questions answered and patient/representative(s) expressed understanding.    - Discussed:     - Discussed with:  Healthcare Power of , Patient      - Extended Intubation/Ventilatory Support Discussed: No.      - Patient is DNR/DNI Status: Yes             Suspend during perioperative period? Yes (Agreeable to resuscitation during perioperative period if throught to be due to temporary changes related to anesthesia. Does not desire prolonged intubation or heroic efforts. ).             Agree to: chemical resuscitation, chest compression/defibrillation.   Use of blood products discussed: No .     Postoperative Care    Pain management: IV analgesics, Oral pain medications, Multi-modal analgesia, Peripheral nerve block (Single Shot).   PONV prophylaxis: Ondansetron (or other 5HT-3), Dexamethasone or Solumedrol      Comments:    Other Comments: Discussed plan for general anesthetic with Oral ETT. Discussed risks of sore throat, post op pain/nausea, oropharyngeal damage, rare major complications.  Daughter (POA) at bedside and primary historian due to dementia. Regional team to do block prior to surgery. Discussed increased risk of cognitive issues/delerium following surgery due to baseline dementia. Patient is DNR/DNI at baseline. Agreeable to intubation for procedure. Patient and POA agreeable to resuscitation including chest compressions and medications while in OR if thought to be due to acute reaction to anesthesia, but would not want prolonged efforts at resuscitation or prolonged intubation.          H&P reviewed: Unable to attach H&P to encounter due to EHR limitations. H&P Update: appropriate H&P reviewed, patient examined. No interval changes since H&P (within 30 days).         Reynold Velez MD

## 2023-05-20 NOTE — LETTER
Health Information Management Services               Recipient:  [facility]          Sender:  DANIA Blanco  [phone]          Date: June 13, 2023  Patient Name:  Kim Ybarra  Patient YOB: 1940  Routing Message:  Discharge orders for D. H.          The documents accompanying this notice contain confidential information belonging to the sender.  This information is intended only for the use of the individual or entity named above.  The authorized recipient of this information is prohibited from disclosing this information to any other party and is required to destroy the information after its stated need has been fulfilled, unless otherwise required by state law.      If you are not the intended recipient, you are hereby notified that any disclosure, copy, distribution or action taken in reliance on the contents of these documents is strictly prohibited.  If you have received this document in error, please return it by fax to [phone] with a note on the cover sheet explaining why you are returning it (e.g. not your patient, not your provider, etc.).  If you need further assistance, please call [facility] Centralized Transcription at [phone].  Documents may also be returned by mail to [employer], , [facility] [address], [place], [place] [address].

## 2023-05-20 NOTE — PROGRESS NOTES
Full consultation to follow    83F with closed L valgus impacted femoral neck fx.    Plan for Percutaneous screw fixation L hip     -NPO  -Strict Bedrest  -Type and Screen, INR  -Hold anticoagulation  -AP pelvis, cross table lateral L hip  -Fascia Iliaca block if patient having severe pain     [facility], preop H&P/clearance/cardiac optimization appreciated  Transfer to [place]    Final OR timing TBD.    Pihllip Sarkar MD  PGY-4 Orthopaedic Surgery

## 2023-05-21 ENCOUNTER — APPOINTMENT (OUTPATIENT)
Dept: PHYSICAL THERAPY | Facility: CLINIC | Age: 83
DRG: 481 | End: 2023-05-21
Attending: PHYSICIAN ASSISTANT
Payer: COMMERCIAL

## 2023-05-21 LAB
ALBUMIN SERPL BCG-MCNC: 3.2 G/DL (ref 3.5–5.2)
ALP SERPL-CCNC: 72 U/L (ref 35–104)
ALT SERPL W P-5'-P-CCNC: 22 U/L (ref 10–35)
ANION GAP SERPL CALCULATED.3IONS-SCNC: 9 MMOL/L (ref 7–15)
AST SERPL W P-5'-P-CCNC: 17 U/L (ref 10–35)
BILIRUB SERPL-MCNC: 0.3 MG/DL
BUN SERPL-MCNC: 16.2 MG/DL (ref 8–23)
CALCIUM SERPL-MCNC: 9.4 MG/DL (ref 8.8–10.2)
CHLORIDE SERPL-SCNC: 109 MMOL/L (ref 98–107)
CREAT SERPL-MCNC: 0.99 MG/DL (ref 0.51–0.95)
DEPRECATED HCO3 PLAS-SCNC: 22 MMOL/L (ref 22–29)
ERYTHROCYTE [DISTWIDTH] IN BLOOD BY AUTOMATED COUNT: 13.2 % (ref 10–15)
GFR SERPL CREATININE-BSD FRML MDRD: 57 ML/MIN/1.73M2
GLUCOSE BLDC GLUCOMTR-MCNC: 150 MG/DL (ref 70–99)
GLUCOSE SERPL-MCNC: 134 MG/DL (ref 70–99)
HCT VFR BLD AUTO: 37.5 % (ref 35–47)
HGB BLD-MCNC: 12 G/DL (ref 11.7–15.7)
HOLD SPECIMEN: NORMAL
MCH RBC QN AUTO: 28.4 PG (ref 26.5–33)
MCHC RBC AUTO-ENTMCNC: 32 G/DL (ref 31.5–36.5)
MCV RBC AUTO: 89 FL (ref 78–100)
PLATELET # BLD AUTO: 144 10E3/UL (ref 150–450)
POTASSIUM SERPL-SCNC: 4.3 MMOL/L (ref 3.4–5.3)
PROT SERPL-MCNC: 5.2 G/DL (ref 6.4–8.3)
RBC # BLD AUTO: 4.22 10E6/UL (ref 3.8–5.2)
SODIUM SERPL-SCNC: 134 MMOL/L (ref 136–145)
SODIUM SERPL-SCNC: 135 MMOL/L (ref 136–145)
SODIUM SERPL-SCNC: 137 MMOL/L (ref 136–145)
SODIUM SERPL-SCNC: 138 MMOL/L (ref 136–145)
SODIUM SERPL-SCNC: 139 MMOL/L (ref 136–145)
SODIUM SERPL-SCNC: 140 MMOL/L (ref 136–145)
SODIUM SERPL-SCNC: 140 MMOL/L (ref 136–145)
WBC # BLD AUTO: 9.5 10E3/UL (ref 4–11)

## 2023-05-21 PROCEDURE — 84295 ASSAY OF SERUM SODIUM: CPT | Performed by: STUDENT IN AN ORGANIZED HEALTH CARE EDUCATION/TRAINING PROGRAM

## 2023-05-21 PROCEDURE — 120N000002 HC R&B MED SURG/OB UMMC

## 2023-05-21 PROCEDURE — 97530 THERAPEUTIC ACTIVITIES: CPT | Mod: GP

## 2023-05-21 PROCEDURE — 99232 SBSQ HOSP IP/OBS MODERATE 35: CPT | Performed by: INTERNAL MEDICINE

## 2023-05-21 PROCEDURE — 250N000011 HC RX IP 250 OP 636: Performed by: STUDENT IN AN ORGANIZED HEALTH CARE EDUCATION/TRAINING PROGRAM

## 2023-05-21 PROCEDURE — 80053 COMPREHEN METABOLIC PANEL: CPT | Performed by: STUDENT IN AN ORGANIZED HEALTH CARE EDUCATION/TRAINING PROGRAM

## 2023-05-21 PROCEDURE — 97161 PT EVAL LOW COMPLEX 20 MIN: CPT | Mod: GP

## 2023-05-21 PROCEDURE — 36415 COLL VENOUS BLD VENIPUNCTURE: CPT | Performed by: STUDENT IN AN ORGANIZED HEALTH CARE EDUCATION/TRAINING PROGRAM

## 2023-05-21 PROCEDURE — 250N000013 HC RX MED GY IP 250 OP 250 PS 637: Performed by: STUDENT IN AN ORGANIZED HEALTH CARE EDUCATION/TRAINING PROGRAM

## 2023-05-21 PROCEDURE — 85027 COMPLETE CBC AUTOMATED: CPT | Performed by: STUDENT IN AN ORGANIZED HEALTH CARE EDUCATION/TRAINING PROGRAM

## 2023-05-21 RX ADMIN — SIMVASTATIN 40 MG: 40 TABLET, FILM COATED ORAL at 20:24

## 2023-05-21 RX ADMIN — CEFAZOLIN SODIUM 2 G: 2 INJECTION, SOLUTION INTRAVENOUS at 03:54

## 2023-05-21 RX ADMIN — Medication 2.5 MG: at 17:51

## 2023-05-21 RX ADMIN — CEFAZOLIN SODIUM 2 G: 2 INJECTION, SOLUTION INTRAVENOUS at 12:52

## 2023-05-21 RX ADMIN — ACETAMINOPHEN 650 MG: 325 TABLET, FILM COATED ORAL at 17:51

## 2023-05-21 RX ADMIN — Medication 1 MG: at 20:24

## 2023-05-21 RX ADMIN — ASPIRIN 81 MG CHEWABLE TABLET 162 MG: 81 TABLET CHEWABLE at 07:57

## 2023-05-21 ASSESSMENT — ACTIVITIES OF DAILY LIVING (ADL)
EQUIPMENT_CURRENTLY_USED_AT_HOME: WALKER, ROLLING;GRAB BAR, TUB/SHOWER;GRAB BAR, TOILET
DRESS: 1-->ASSISTANCE (EQUIPMENT/PERSON) NEEDED
ADLS_ACUITY_SCORE: 47
ADLS_ACUITY_SCORE: 45
DIFFICULTY_EATING/SWALLOWING: NO
DRESS: 1-->ASSISTANCE (EQUIPMENT/PERSON) NEEDED (NOT DEVELOPMENTALLY APPROPRIATE)
ADLS_ACUITY_SCORE: 45
TOILETING_ISSUES: NO
DOING_ERRANDS_INDEPENDENTLY_DIFFICULTY: YES
CONCENTRATING,_REMEMBERING_OR_MAKING_DECISIONS_DIFFICULTY: YES
ADLS_ACUITY_SCORE: 45
TRANSFERRING: 1-->ASSISTANCE (EQUIPMENT/PERSON) NEEDED
NUMBER_OF_TIMES_PATIENT_HAS_FALLEN_WITHIN_LAST_SIX_MONTHS: 1
ADLS_ACUITY_SCORE: 45
DRESSING/BATHING_DIFFICULTY: YES
TRANSFERRING: 1-->ASSISTANCE (EQUIPMENT/PERSON) NEEDED (NOT DEVELOPMENTALLY APPROPRIATE)
ADLS_ACUITY_SCORE: 45
CHANGE_IN_FUNCTIONAL_STATUS_SINCE_ONSET_OF_CURRENT_ILLNESS/INJURY: YES
ADLS_ACUITY_SCORE: 45
FALL_HISTORY_WITHIN_LAST_SIX_MONTHS: YES
ADLS_ACUITY_SCORE: 45
ADLS_ACUITY_SCORE: 45
BATHING: 1-->ASSISTANCE NEEDED
WEAR_GLASSES_OR_BLIND: NO
ADLS_ACUITY_SCORE: 45
WALKING_OR_CLIMBING_STAIRS_DIFFICULTY: YES

## 2023-05-21 NOTE — ANESTHESIA POSTPROCEDURE EVALUATION
Patient: Kim Ybarra    Procedure: Procedure(s):  Closed reduction, percutaneous pinning hip       Anesthesia Type:  General    Note:  Disposition: Inpatient   Postop Pain Control: Uneventful            Sign Out: Well controlled pain   PONV: No   Neuro/Psych: Uneventful            Sign Out: Acceptable/Baseline neuro status   Airway/Respiratory: Uneventful            Sign Out: Acceptable/Baseline resp. status   CV/Hemodynamics: Uneventful            Sign Out: Acceptable CV status; No obvious hypovolemia; No obvious fluid overload   Other NRE:    DID A NON-ROUTINE EVENT OCCUR?            Last vitals:  Vitals Value Taken Time   /74 05/20/23 2200   Temp     Pulse 70 05/20/23 2203   Resp 11 05/20/23 2203   SpO2 100 % 05/20/23 2200   Vitals shown include unvalidated device data.    Electronically Signed By: Monet Contreras MD  May 20, 2023  10:05 PM

## 2023-05-21 NOTE — OR NURSING
PACU to Inpatient Nursing Handoff    Patient Kim Ybarra is a 83 year old female who speaks English.   Procedure Procedure(s):  Closed reduction, percutaneous pinning hip   Surgeon(s) Primary: Lilly Rey MD  Resident - Assisting: Phillip Sarkar MD     Allergies   Allergen Reactions     Other Food Allergy Unknown     Beef       Isolation  [unfilled]     Past Medical History   has a past medical history of Poor historian.    Anesthesia General   Dermatome Level     Preop Meds acetaminophen (Tylenol) - time given: 1114   Nerve block Femoral (hip).  Location:left. Med:bupivacaine. Time given: 1812   Intraop Meds dexamethasone (Decadron)  fentanyl (Sublimaze): 75 mcg total  ondansetron (Zofran): last given at 2126  phenylephrine gtt   Local Meds No   Antibiotics cefazolin (Ancef) - last given at 2005     Pain Patient Currently in Pain: other (see comments) (sedated)   PACU meds  Not applicable   PCA / epidural No   Capnography     Telemetry ECG Rhythm: Normal sinus rhythm   Inpatient Telemetry Monitor Ordered? No        Labs Glucose Lab Results   Component Value Date    GLC 97 05/20/2023       Hgb Lab Results   Component Value Date    HGB 13.4 05/20/2023       INR Lab Results   Component Value Date    INR 1.10 05/20/2023      PACU Imaging Not applicable     Wound/Incision Incision/Surgical Site 05/20/23 Left Hip (Active)   Incision Assessment UTV 05/20/23 2135   Incision Drainage Amount None 05/20/23 2135   Dressing Intervention Clean, dry, intact 05/20/23 2135   Number of days: 0      CMS        Equipment ice pack   Other LDA       IV Access Peripheral IV 05/20/23 Anterior;Right Lower forearm (Active)   Site Assessment WDL 05/20/23 2135   Line Status Infusing 05/20/23 2135   Dressing Status clean;dry;intact 05/20/23 2135   Number of days: 0      Blood Products Not applicable EBL 10 mL   Intake/Output Date 05/20/23 0700 - 05/21/23 0659   Shift 1111-2799 9006-6647 7713-3557 24 Hour Total   INTAKE   I.V.   1200  1200   Shift Total(mL/kg)  1200(19.6)  1200(19.6)   OUTPUT   Blood  10  10   Shift Total(mL/kg)  10(0.16)  10(0.16)   Weight (kg) 61.23 61.23 61.23 61.23      Drains / Dooley     Time of void PreOp Time of Void Prior to Procedure: 1600 (05/20/23 1833)    PostOp Urine Occurrence: 1 (incontinent) (05/20/23 1900)    Diapered? Yes   Bladder Scan     PO    not given yet     Vitals    B/P: (!) 151/71  T: 97.3  F (36.3  C)    Temp src: Axillary  P:  Pulse: 78 (05/20/23 2135)          R: (!) 31  O2:  SpO2: 100 %    O2 Device: Oxymask (05/20/23 2135)    Oxygen Delivery: 6 LPM (05/20/23 2135)         Family/support present daughter available by phone   Patient belongings     Patient transported on cart   DC meds/scripts (obs/outpt) Not applicable   Inpatient Pain Meds Released? Yes       Special needs/considerations Pt is incontinent at baseline. Has dementia at baseline but is very pleasant.   Tasks needing completion None       Mesha Mortensen RN  [facility] [id number]

## 2023-05-21 NOTE — PROGRESS NOTES
"Orthopedic Surgery Progress Note: 05/21/2023    Subjective:   No acute events overnight. Pain well-controlled. Tolerating a diet. No new concerns or complaints. Denies SOB, chest Pain, fevers, chills.  Patient is doing exceptionally well.    Objective:   BP (!) 168/77 (BP Location: Left arm, Patient Position: Supine, Cuff Size: Adult Small)   Pulse 80   Temp 97.4  F (36.3  C) (Oral)   Resp 16   Ht 1.626 m (5' 4\")   Wt 61.2 kg (135 lb)   LMP  (LMP Unknown)   SpO2 98%   BMI 23.17 kg/m    No intake/output data recorded.  General: NAD. Resting comfortably in bed.  Respiratory: Breathing comfortably on RA.  Musculoskeletal:    Focused Exam of LEFT Lower Extremity  Dressing C/D/I.   Fires EHL, FHL, TA, GSC, Quad  SILT DP, SP, Saph, Sural, Tibial Nerve Distributions  2+ DP pulse, foot WWP      Laboratory Data:  Lab Results   Component Value Date    WBC 9.5 05/21/2023    HGB 12.0 05/21/2023     (L) 05/21/2023    INR 1.10 05/20/2023         Assessment & Plan:   83-year-old female with closed left valgus impacted femoral neck fracture.     PATIENT IS DNR. NO COMPRESSIONS IF INTRA-OP CARDIAC ARREST.     TODAY:  -Breakfast this morning  -Labs this morning  -NEEDS PT TODAY  -Completion of Antibiotics  -Start ASA for dvt ppx.    MEDICINE PrimaryADAT  WBAT LLE  Recommend  mg qDAY for DVT ppx  AM Hgb  PT/OT POD1, OOB with assist     Follow up 2 weeks, Dr. Rey. Repeat films AP pelvis, L hip cross table lateral.     - Disposition: Per primary. Likely will need TCU placement.    Orthopedic surgery staff for this patient is Dr. Rey.    ------------------------------------------------------------------------------------------    Phillip Sarkar MD  Orthopedic Surgery PGY4  [phone]      FOLLOWUP:    Future Appointments   Date Time Provider Department Center   5/21/2023  7:45 AM UR OT WAITLIST [facility] [place]   5/21/2023  7:45 AM Roxanne Mcdonough, PT [facility]   6/7/2023  1:45 PM Dusty Cole MD " [facility]   6/15/2023  8:30 AM Dusty Cole MD [facility]

## 2023-05-21 NOTE — PLAN OF CARE
Pt is disorient to situation, time, and place. Bed alarm. VSS. LS clear, on RA. BS active, LBM unknown. Pt is incontinent of bladder and bowel. L hip surgical dressing is c/d/I. L PIV is patent and SL. Pt up with 2 assist with walker. Continue to monitor.

## 2023-05-21 NOTE — ANESTHESIA PROCEDURE NOTES
Airway       Patient location during procedure: OR       Procedure Start/Stop Times: 5/20/2023 8:17 PM  Staff -        CRNA: Ruy Milligan APRN CRNA       Performed By: CRNAIndications and Patient Condition       Indications for airway management: vikram-procedural       Induction type:intravenous       Mask difficulty assessment: 1 - vent by mask    Final Airway Details       Final airway type: endotracheal airway       Successful airway: ETT - single and Oral  Endotracheal Airway Details        ETT size (mm): 7.0       Cuffed: yes       Successful intubation technique: direct laryngoscopy       DL Blade Type: MAC 3       Grade View of Cords: 1       Adjucts: stylet       Position: Right       Measured from: lips       Secured at (cm): 22       Bite block used: None    Post intubation assessment        Placement verified by: capnometry, equal breath sounds and chest rise        Number of attempts at approach: 1       Number of other approaches attempted: 0       Secured with: pink tape       Ease of procedure: easy       Dentition: Intact and Unchanged    Medication(s) Administered   Medication Administration Time: 5/20/2023 8:17 PM         Male

## 2023-05-21 NOTE — PLAN OF CARE
VS: Temp: 97.4  F (36.3  C) Temp src: Oral BP: (!) 168/77 Pulse: 80   Resp: 16 SpO2: 98 % O2 Device: Nasal cannula Oxygen Delivery: 2 LPM     O2: On supplemental O2 while asleep. Patient desaturates while asleep, apneic and a mouth breather   Output: Voided spontaneously in her brief, PVR 127mL   Last BM: unknown   Activity: Up with SBA of 1-2 staff, due to get up   Skin: Intact with the exception of the incision to left hip   Pain: denies   CMS: Confused X3, patient attempted to get up, dislodged the PIV in the process, able to redirect, forgetful   Dressing: CDI   Diet: REG   LDA: PIV to left forearm, patent, infusing   Equipment: IV pole/pump   Plan: TBD   Additional Info: Bed alarm turned on, patient is confused.

## 2023-05-21 NOTE — BRIEF OP NOTE
[facility]    Brief Operative Note    Pre-operative diagnosis: Closed left valgus impacted femoral neck fracture.  Post-operative diagnosis Same as pre-operative diagnosis    Procedure: Procedure(s):  Closed reduction, percutaneous pinning hip  Surgeon: Surgeon(s) and Role:     * Lilly Rey MD - Primary     * Phillip Sarkar MD - Resident - Assisting  Anesthesia: General   Estimated Blood Loss: 10 mL from 5/20/2023  8:02 PM to 5/20/2023  9:33 PM      Drains: None  Specimens: * No specimens in log *  Findings:   None.  Complications: None.  Implants:   Implant Name Type Inv. Item Serial No.  Lot No. LRB No. Used Action   SCREW BN 90MM 6.5MM SS CNN 20MM STRL ASNS III HIP FEM - [id number] Metallic Hardware/[place] SCREW BN 90MM 6.5MM SS CNN 20MM STRL ASNS III HIP FEM  [employer]  Left 2 Implanted   SCREW BN 95MM 6.5MM SS CNN 20MM STRL ASNS III SM BN - [id number] Metallic Hardware/[place] SCREW BN 95MM 6.5MM SS CNN 20MM STRL ASNS III SM BN  [employer]  Left 1 Implanted         Assessment:  83-year-old female with closed left valgus impacted femoral neck fracture.     PATIENT IS DNR. NO COMPRESSIONS IF INTRA-OP CARDIAC ARREST.     ADAT  WBAT LLE  Recommend  mg qDAY for DVT ppx  AM Hgb  PT/OT POD1, OOB with assist    Follow up 2 weeks, Dr. Rey. Repeat films AP pelvis, L hip cross table lateral.     Phillip Sarkar MD  PGY-4 Orthopaedic Surgery

## 2023-05-21 NOTE — PROGRESS NOTES
[facility] - Internal Medicine Daily Note   Date of Service: 5/21/2023  Patient: Kim Ybarra  MRN: [id number]  Admission Date: 5/20/2023  Hospital Day # 1      Kim Ybarra is a 83 year old female patient with a past medical history significant for HTN, HLD, overactive bladder, GERD, basal cell carcinoma, osteoporosis, frequent falls, recurrent UTIs, cervical spondylosis, Alzheimer's dementia who presented from memory care assisted living to [facility] ED after a mechanical fall at home with left hip pain; found to have femoral neck fracture.     Mechanical fall with resultant Mildly impacted subcapital left femoral neck fracture, POD #1  Trauma work up in ED included CT C/A/P, CT cervical spine, CT head, CT L Hip and XR pelvis with L hip. Findings notable for mildly impacted subcapital left femoral neck fracture, small-moderate deep subcutaneous hematoma posterolateral left hip compatible with soft tissue contusion.Pt scheduled for surgery  5/20/23     Hypernatremia, resolved  Sodium noted to be 155 at admission in setting of poor PO and fall. She received 1L NS in ED.   - Sodium recheck: 140   - IVF while NPO, would likely choose D5 or 1/2 NS     JAYLEN  Creatinine at admission noted to be 1.02. Patient received 1L NS in ED.   - IVF hydration overnight   - Hold ARB   - Recheck BMP in am     Alzheimer's dementia   - Delirium precautions   - PT, OT and social work consults   - No longer on Aricept     HTN   - Hold PTA Cozaar in the immediate operative setting. Consider resume in 1-2 days.     HLD   - Continue PTA statin     Lung nodules  Few sub 6mm pulmonary nodules noted incidentally on CT C/A/P at admission.   - Will need repeat Chest CT in 12 months     Fluid attenuation anterior mediastinum  Noted incidentally on CT C/A/P at admission.   - Consider MR Chest for better characterization once able to reliably remain still and once femoral fracture repaired.           Diet: NPO for Medical/Clinical Reasons  Except for: Meds  DVT Prophylaxis: Pneumatic Compression Devices pre-op  Dooley Catheter: Not present  Lines: None     Cardiac Monitoring: None  Code Status: No CPR- Do NOT Intubate            Shno Valenzuela MD  Internal Medicine Staff Hospitalist   [facility]   Pager: [phone]    Team: Jina Hines 16  Page Cross Cover after 5 pm: pager [phone]   ___________________________________________________________________    Subjective & Interval Hx:    Patient awake alert oriented x3.  She was able to get up and walk with physical therapy.  Has a lot of running at 100 mm/h.  She just finished her breakfast.  Appetite is good.  Denies any dizziness or shortness of breath.    Last 24 hr care team notes reviewed.   ROS:  4 point ROS including Respiratory, CV, GI and , other than that noted in the HPI, is negative.    Medications: Reviewed in EPIC. List below for reference    Current Facility-Administered Medications:      acetaminophen (TYLENOL) tablet 650 mg, 650 mg, Oral, Q6H PRN, Phillip Sarkar MD     aspirin (ASA) chewable tablet 162 mg, 162 mg, Oral, Daily, Phillip Sarkar MD, 162 mg at 05/21/23 0757     ceFAZolin (ANCEF) 2 g in 100 mL D5W intermittent infusion, 2 g, Intravenous, Q8H, Phillip Sarkar MD, Last Rate: 200 mL/hr at 05/21/23 1252, 2 g at 05/21/23 1252     HYDROmorphone (DILAUDID) injection 0.2 mg, 0.2 mg, Intravenous, Q3H PRN, Phillip Sarkar MD     lactated ringers infusion, , Intravenous, Continuous, Phillip Sarkar MD     lidocaine (LMX4) cream, , Topical, Q1H PRN, Phillip Sarkar MD     lidocaine 1 % 0.1-1 mL, 0.1-1 mL, Other, Q1H PRN, Phillip Sarkar MD     [Held by provider] losartan (COZAAR) tablet 50 mg, 50 mg, Oral, Daily, Isuaro Coleman, PA-C     melatonin tablet 1 mg, 1 mg, Oral, At Bedtime PRN, Phillip Sarkar MD     naloxone (NARCAN) injection 0.2 mg, 0.2 mg, Intravenous, Q2 Min PRN **OR** naloxone (NARCAN) injection 0.4 mg,  "0.4 mg, Intravenous, Q2 Min PRN **OR** naloxone (NARCAN) injection 0.2 mg, 0.2 mg, Intramuscular, Q2 Min PRN **OR** naloxone (NARCAN) injection 0.4 mg, 0.4 mg, Intramuscular, Q2 Min PRN, Isauro Coleman PA-C     ondansetron (ZOFRAN ODT) ODT tab 4 mg, 4 mg, Oral, Q6H PRN **OR** ondansetron (ZOFRAN) injection 4 mg, 4 mg, Intravenous, Q6H PRN, Phillip Sarkar MD     oxyCODONE IR (ROXICODONE) half-tab 2.5 mg, 2.5 mg, Oral, Q4H PRN, Phillip Sarkar MD     polyethylene glycol (MIRALAX) Packet 17 g, 17 g, Oral, Daily PRN, Phillip Sarkar MD     senna-docusate (SENOKOT-S/PERICOLACE) 8.6-50 MG per tablet 1 tablet, 1 tablet, Oral, BID PRN **OR** senna-docusate (SENOKOT-S/PERICOLACE) 8.6-50 MG per tablet 2 tablet, 2 tablet, Oral, BID PRN, Phillip Sarkar MD     simvastatin (ZOCOR) tablet 40 mg, 40 mg, Oral, At Bedtime, Phillip Sarkar MD     sodium chloride (PF) 0.9% PF flush 3 mL, 3 mL, Intracatheter, Q8H, Phillip Sarkar MD     sodium chloride (PF) 0.9% PF flush 3 mL, 3 mL, Intracatheter, q1 min prn, Phillip Sarkar MD    Physical Exam:    BP (!) 168/77 (BP Location: Left arm, Patient Position: Supine, Cuff Size: Adult Small)   Pulse 80   Temp 97.4  F (36.3  C) (Oral)   Resp 16   Ht 1.626 m (5' 4\")   Wt 61.2 kg (135 lb)   LMP  (LMP Unknown)   SpO2 98%   BMI 23.17 kg/m         General Appearance:  Patient is lying comfortably in bed, no acute distress.   HEENT: Normocephalic, sclera anicteric. Hearing intact to normal conversational volume. No excessive throat clearing.   Respiratory: Lungs CTAB, no adventitious sounds, breathing comfortably on room air.   Cardiovascular: RRR, S1 and S2 appreciated. No murmurs, rubs, or gallops.   GI: Bowel sounds present. Abdomen is soft, non-tender, and non-distended. No guarding.  Skin: Skin without evident abnormality.   Neuro: Patient is alert and oriented to self only. No asymmetric facial features, no obvious neuro deficit on gross " examination.   MSK: L hip externally rotated with patient lying supine. No other obvious joint abnormalities on gross examination.  Psych: Pleasant disposition, appropriate affect. Interactive and cooperative with provider.   Labs & Studies of Note: I personally reviewed the following studies:  Lab Results   Component Value Date    WBC 9.5 05/21/2023     Lab Results   Component Value Date    RBC 4.22 05/21/2023     Lab Results   Component Value Date    HGB 12.0 05/21/2023     Lab Results   Component Value Date    HCT 37.5 05/21/2023     Lab Results   Component Value Date    MCV 89 05/21/2023     Lab Results   Component Value Date    MCH 28.4 05/21/2023     Lab Results   Component Value Date    MCHC 32.0 05/21/2023     Lab Results   Component Value Date    RDW 13.2 05/21/2023     Lab Results   Component Value Date     05/21/2023     Last Comprehensive Metabolic Panel:  Lab Results   Component Value Date     05/21/2023    POTASSIUM 4.3 05/21/2023    CHLORIDE 109 (H) 05/21/2023    CO2 22 05/21/2023    ANIONGAP 9 05/21/2023     (H) 05/21/2023    BUN 16.2 05/21/2023    CR 0.99 (H) 05/21/2023    GFRESTIMATED 57 (L) 05/21/2023    SUZY 9.4 05/21/2023

## 2023-05-21 NOTE — OP NOTE
OPERATIVE REPORT    Patient: Kim Ybarra  : 1940  Date of Service: 2023     PREOPERATIVE DIAGNOSIS: Closed left valgus impacted femoral neck fracture          POSTOPERATIVE DIAGNOSIS: Same    PROCEDURES:  -Percutaneous pinning left femoral neck    STAFF SURGEON: Lilly Rey MD    FIRST ASSISTANT: Phillip Sarkar MD, PGY-4    ANESTHESIA: General Endotracheal    COMPLICATIONS: None apparent immediately postoperatively.    SPECIMENS: None.     DRAINS: None    TOURNIQUET: None used    ESTIMATED BLOOD LOSS: 10 mL    IMPLANTS: Elba 6.5 mm cannulated partially-threaded screws (95 mm, 90 mm, 90 mm)  Implant Name Type Inv. Item Serial No.  Lot No. LRB No. Used Action   SCREW BN 90MM 6.5MM SS CNN 20MM STRL ASNS III HIP FEM - [id number] Metallic Hardware/Davis SCREW BN 90MM 6.5MM SS CNN 20MM STRL ASNS III HIP FEM  [employer]  Left 2 Implanted   SCREW BN 95MM 6.5MM SS CNN 20MM STRL ASNS III SM BN - [id number] Metallic Hardware/Davis SCREW BN 95MM 6.5MM SS CNN 20MM STRL ASNS III SM BN  [employer]  Left 1 Implanted       SIGNIFICANT FINDINGS:  Valgus impacted femoral neck fracture, acceptable alignment on preoperative radiographs, intraoperative fluoroscopy.    INDICATIONS:                          Kim Ybarra is a 83 year old female who sustained a closed left valgus impacted femoral neck fracture, after a ground-level fall. Indications for surgery were discussed with the patient in detail. Risks and benefits of percutaneous fixation with cannulated screws, versus arthroplasty option discussed at length.  After thorough discussion regarding the risks and benefits of the various procedures, the patient's daughter (POA) elected to proceed with percutaneous fixation with cannulated screws of her left valgus impacted femoral neck fracture. After discussing risks, benefits and alternatives to procedure, the patient and her daughter (designated power of ) elected to proceed with  surgical intervention.     The patient understood that risks include, but are not limited to: Bleeding, infection, damage to surrounding structures (such as nerve, vessel or tendon), need for additional procedures, pain, stiffness, need for therapy, and anesthetic complications. The patient and the power of  expressed understanding and agreement and wished to proceed.     Consent was obtained for the procedure from the patient's daughter.    DESCRIPTION OF PROCEDURE:             Kim Ybarra was met in the preoperative holding area where the correct surgical site was identified and marked by the primary surgeon. The patient was thentaken to the operating  room, where the Anesthesiology Service induced satisfactory general anesthesia.  Ancef was given IV.  Venous thromboembolic prophylaxis was performed with sequential devices. The patient was placed supine on a fracture table, with all bony prominences well padded and a safety strap across the patient's waist. The patient was then prepped and draped in the usual sterile fashion. Prior to proceeding with the operation a timeout was held per hospital policy correctly identifying the patient, operative site, and procedure to be performed. All in the room agreed.    C-arm fluoroscopy verified satisfactory alignment of the valgus impacted left femoral neck fracture.    A 2 cm incision was made through the skin with a 10 blade scalpel, and then through the iliotibial band.  C-arm fluoroscopy was used to localize a starting point at the lateral proximal femur for appropriate cannulated screw trajectory.  Care was taken to ensure the starting point was above the lesser trochanter.    Starting with the most inferior screw, a threaded guidepin was used to obtain an appropriate trajectory, along the inferior medial calcar.  A second and third guidepin was placed in a typical fashion using multiple fluoroscopic views.    A calibrated measuring device was then used to  determine appropriate screw length.    The appropriate drill was then used to enter the lateral cortical bone.  The patient had severe osteoporosis, and drilling up until the subchondral bone was not necessary for each screw.    The 6.5 mm partially-threaded cannulated screws were then inserted in a typical fashion.  The most inferior screw measured 95 mm, the 2 superior screws measured 90 mm.  Care was taken to ensure the screws were in an appropriate configuration, with threads ending within 5 mm of subchondral bone.    Final fluoroscopic views demonstrated appropriate implant position, and fracture alignment.    The wound was irrigated copiously with sterile normal saline.  2-0 Monocryl was placed in a buried interrupted fashion in the deep dermal tissue, and 3-0 Monocryl was used in a running subcuticular fashion.  Sterile dressings were applied.    All needle and sponge counts were correct at the end of the procedure. There were no complications.    Dr. Rey was present for all critical portions of the case.    The patient was awoken from anesthesia and taken to the postoperative recovery unit in stable condition.    POSTOPERATIVE PLAN:  ADAT  WBAT LLE  Recommend  mg qDAY for 4 weeks for DVT ppx  AM Hgb  PT/OT POD1, OOB with assist    Follow up 2 weeks, Dr. Rey. Repeat films AP pelvis, L hip cross table lateral.     Phillip Sarkar MD  PGY-4 Orthopaedic Surgery

## 2023-05-21 NOTE — PROGRESS NOTES
05/21/23 0900   Appointment Info   Signing Clinician's Name / Credentials (PT) Roxanne Mcdonough DPT   Rehab Comments (PT) WBAT L LE, baseline dementia from memory care   Living Environment   People in Home facility resident   Current Living Arrangements assisted living  ([facility] (memory care))   Home Accessibility no concerns   Transportation Anticipated   (unknown)   Living Environment Comments from [facility], use FWW baseline, pretty ind amb to meals pt reports, staff assists w/ shower   Self-Care   Usual Activity Tolerance good  (using FWW amb to meals)   Current Activity Tolerance fair  (not OOB since surg)   Equipment Currently Used at Home grab bar, toilet;grab bar, tub/shower;walker, rolling   Fall history within last six months yes   Number of times patient has fallen within last six months 1  (fell/slipped off bed trying to get dressed)   Activity/Exercise/Self-Care Comment using FWW at baseline, sounds rather ind w/ mobility at baseline per pt reports   General Information   Onset of Illness/Injury or Date of Surgery 05/20/23   Referring Physician Isauro Coleman, PAChandrakantC   Patient/Family Therapy Goals Statement (PT) wants to walk w/ FWW   Pertinent History of Current Problem (include personal factors and/or comorbidities that impact the POC) 83-year-old female with closed left valgus impacted femoral neck fracture. Percutaneous pinning left femoral neck   Existing Precautions/Restrictions fall;weight bearing   Weight-Bearing Status - LUE full weight-bearing   Weight-Bearing Status - RUE full weight-bearing   Weight-Bearing Status - LLE weight-bearing as tolerated   Weight-Bearing Status - RLE full weight-bearing   General Observations pt very pleasant and conversive during evaluation, resting supine comfortably in bed. Does get distracted by TV intermittently   Cognition   Affect/Mental Status (Cognition) confused   Cognitive Status Comments Pt shows signs of confusion, not oriented to  place, does recall falling and is aware she had surgery. She does have repetitious speaking and says the same stories and statements multiple times throughout evaluation   Pain Assessment   Patient Currently in Pain Yes, see Vital Sign flowsheet   Integumentary/Edema   Integumentary/Edema   (incision not directly observed)   Posture    Posture Forward head position;Protracted shoulders   Range of Motion (ROM)   Range of Motion ROM deficits secondary to surgical procedure;ROM deficits secondary to pain   Strength (Manual Muscle Testing)   Strength (Manual Muscle Testing) No deficits observed during functional mobility  (limited d/t pain, does not demonstrate any buckling w/ WB)   Bed Mobility   Comment, (Bed Mobility) modAx1 bed mob   Transfers   Comment, (Transfers) CGA transfer w/ FWW   Gait/Stairs (Locomotion)   Comment, (Gait/Stairs) Amb in room w/ CGA and FWW   Balance   Balance no deficits were identified   Sensory Examination   Sensory Perception patient reports no sensory changes   Clinical Impression   Criteria for Skilled Therapeutic Intervention Yes, treatment indicated   PT Diagnosis (PT) impaired functional mobility   Influenced by the following impairments pain, post-operative restrictions   Functional limitations due to impairments impaired bed mob, transfers, and amb   Clinical Presentation (PT Evaluation Complexity) Stable/Uncomplicated   Clinical Presentation Rationale per clinical judgment   Clinical Decision Making (Complexity) low complexity   Planned Therapy Interventions (PT) bed mobility training;gait training;transfer training   Anticipated Equipment Needs at Discharge (PT)   (none, pt states she has her own FWW)   Risk & Benefits of therapy have been explained evaluation/treatment results reviewed;care plan/treatment goals reviewed;risks/benefits reviewed;current/potential barriers reviewed;participants voiced agreement with care plan;participants included;patient   Clinical Impression  Comments Pt having pain w/ mobility but w/ close supervision and assist of 1 is able to mobilize safely, would need 24/7 supervision to be safe   PT Total Evaluation Time   PT Eval, Low Complexity Minutes (89270) 5   Plan of Care Review   Plan of Care Reviewed With patient   Physical Therapy Goals   PT Frequency Daily   PT Predicted Duration/Target Date for Goal Attainment 06/11/23   PT Goals Bed Mobility;Transfers;Gait   PT: Bed Mobility Independent;Supine to/from sit;Rolling   PT: Transfers Modified independent;Sit to/from stand;Bed to/from chair;Assistive device;Within precautions   PT: Gait Modified independent;Assistive device;Rolling walker;Within precautions;50 feet   Interventions   Interventions Quick Adds Therapeutic Activity   Therapeutic Activity   Therapeutic Activities: dynamic activities to improve functional performance Minutes (79154) 30   Symptoms Noted During/After Treatment Increased pain   Treatment Detail/Skilled Intervention Pt supine in bed on PT arrival, and agreeable to participate. She does get distracted w/ unrelated stories and tends to be repetitious in story telling but able to be re-directed easily and very pleasant/agreeable to work w/ therapy. She requires step by step cuing and PT manually advancing B LEs off EOB, does have increasd pain w/ advancement of L LE, once LEs towards EOB requires modAx1 via trunk support and HHA to sit upright. Needs verbal cues repeated to scoot forward towards EOB. Pt denies symptoms at EOB. Educated on WBAT and pt does seem to understand this education stating that she knows she can walk if she has a walker w/. Completes STS at FWW w/ CGA. Once standing has no concerns w/ stability and demonstrates ability to at least PWB through L LE. Takes 2 small steps forward first and able to do this well so progressed to in room amb. Pt amb 12ft total w/ FWW and needs constant cues verbally as well as manually assisting walker to be in correct position. Pt tends  to push the walker too far forwards and needs manual cues to keep walker near and verbal cues for each LE to advance and when to advance walker. She does have pain w/ WB through L LE but was able to amb safely w/ assist and no evidence of buckling present. Completes SPT w/ FWW again needing step by step manual guidance and VC. Sits EOB and completes sit>sup ind w/ increased pain. Pt made comfortable in bed and tray table and call light brought near.   PT Discharge Planning   PT Plan progress ind w/ bed mob, progress ind and distance w/ amb   PT Discharge Recommendation (DC Rec) Transitional Care Facility;home with assist   PT Rationale for DC Rec Dual recs TCU vs. memory care. Pt may benefit from TCU stay to help progress ind w/ functional mobility as per her reports she was rather ind w/ FWW at her memory care, if pt unable to demonstrate carryover and learning potential w/ therapies then anticipate she can DC back to her memory care w/ increased staff assistance and supervision.   PT Brief overview of current status ModAx1 bed mob, very close CGA for transfer and in room amb w/ FWW   Total Session Time   Timed Code Treatment Minutes 30   Total Session Time (sum of timed and untimed services) 35

## 2023-05-21 NOTE — ANESTHESIA CARE TRANSFER NOTE
Patient: Kim Ybarra    Procedure: Procedure(s):  Closed reduction, percutaneous pinning hip       Diagnosis: Hip fracture (H) [S72.009A]  Diagnosis Additional Information: No value filed.    Anesthesia Type:   General     Note:    Oropharynx: spontaneously breathing  Level of Consciousness: awake  Oxygen Supplementation: face mask  Level of Supplemental Oxygen (L/min / FiO2): 8  Independent Airway: airway patency satisfactory and stable  Dentition: dentition unchanged  Vital Signs Stable: post-procedure vital signs reviewed and stable  Report to RN Given: handoff report given  Patient transferred to: PACU    Handoff Report: Identifed the Patient, Identified the Reponsible Provider, Reviewed the pertinent medical history, Discussed the surgical course, Reviewed Intra-OP anesthesia mangement and issues during anesthesia, Set expectations for post-procedure period and Allowed opportunity for questions and acknowledgement of understanding      Vitals:  Vitals Value Taken Time   /71    Temp 36.5    Pulse 77    Resp 12    SpO2 100        Electronically Signed By: JERONIMO Chavez CRNA  May 20, 2023  9:37 PM

## 2023-05-22 ENCOUNTER — APPOINTMENT (OUTPATIENT)
Dept: PHYSICAL THERAPY | Facility: CLINIC | Age: 83
DRG: 481 | End: 2023-05-22
Payer: COMMERCIAL

## 2023-05-22 LAB
HOLD SPECIMEN: NORMAL
SODIUM SERPL-SCNC: 133 MMOL/L (ref 136–145)
SODIUM SERPL-SCNC: 134 MMOL/L (ref 136–145)
SODIUM SERPL-SCNC: 136 MMOL/L (ref 136–145)
SODIUM SERPL-SCNC: 137 MMOL/L (ref 136–145)

## 2023-05-22 PROCEDURE — 250N000013 HC RX MED GY IP 250 OP 250 PS 637: Performed by: PHYSICIAN ASSISTANT

## 2023-05-22 PROCEDURE — 120N000002 HC R&B MED SURG/OB UMMC

## 2023-05-22 PROCEDURE — 36415 COLL VENOUS BLD VENIPUNCTURE: CPT | Performed by: STUDENT IN AN ORGANIZED HEALTH CARE EDUCATION/TRAINING PROGRAM

## 2023-05-22 PROCEDURE — 250N000013 HC RX MED GY IP 250 OP 250 PS 637: Performed by: STUDENT IN AN ORGANIZED HEALTH CARE EDUCATION/TRAINING PROGRAM

## 2023-05-22 PROCEDURE — 84295 ASSAY OF SERUM SODIUM: CPT | Performed by: STUDENT IN AN ORGANIZED HEALTH CARE EDUCATION/TRAINING PROGRAM

## 2023-05-22 PROCEDURE — 97530 THERAPEUTIC ACTIVITIES: CPT | Mod: GP

## 2023-05-22 RX ADMIN — SIMVASTATIN 40 MG: 40 TABLET, FILM COATED ORAL at 19:56

## 2023-05-22 RX ADMIN — ACETAMINOPHEN 650 MG: 325 TABLET, FILM COATED ORAL at 16:04

## 2023-05-22 RX ADMIN — ACETAMINOPHEN 650 MG: 325 TABLET, FILM COATED ORAL at 03:34

## 2023-05-22 RX ADMIN — LOSARTAN POTASSIUM 50 MG: 50 TABLET, FILM COATED ORAL at 09:01

## 2023-05-22 RX ADMIN — ASPIRIN 81 MG CHEWABLE TABLET 162 MG: 81 TABLET CHEWABLE at 09:01

## 2023-05-22 RX ADMIN — Medication 2.5 MG: at 03:34

## 2023-05-22 RX ADMIN — Medication 2.5 MG: at 20:04

## 2023-05-22 ASSESSMENT — ACTIVITIES OF DAILY LIVING (ADL)
ADLS_ACUITY_SCORE: 38
ADLS_ACUITY_SCORE: 39
ADLS_ACUITY_SCORE: 47
ADLS_ACUITY_SCORE: 39
ADLS_ACUITY_SCORE: 39
ADLS_ACUITY_SCORE: 38
ADLS_ACUITY_SCORE: 39
ADLS_ACUITY_SCORE: 47
ADLS_ACUITY_SCORE: 38
ADLS_ACUITY_SCORE: 47

## 2023-05-22 NOTE — PLAN OF CARE
"  VS: /57 (BP Location: Left arm)   Pulse 67   Temp 97  F (36.1  C) (Oral)   Resp 16   Ht 1.626 m (5' 4\")   Wt 61.2 kg (135 lb)   LMP  (LMP Unknown)   SpO2 97%   BMI 23.17 kg/m    Pt denies chest pain.    O2: >90% on RA. Lung sounds clear and equal bilaterally.    Output: Purewick in place with adequate output   Last BM: 5/21/23   Activity: AX1-2 with walker and GB.Pt not OOB overnight   Up for meals? N/A   Skin: L hip incision and scab to shin. Bruising to R forearm   Pain: Managed with PRN Tylenol, Oxycodone, and ice packs   CMS: Intact, pt denies numbness or tingling. Disoriented to place, time, and situation.    Dressing: L hip - CDI    Diet: Regular - pt needs help to order food. Menu in pt chart that has circled foods she likes.    LDA: PIV L - SL    Equipment: IV pole/pump, Walker, gait belt, and personal belongings.    Plan: Per ortho's previous note pt will probably need TCU placement.    Additional Info:        "

## 2023-05-22 NOTE — PROGRESS NOTES
SPIRITUAL HEALTH SERVICES Progress Note  [facility] ([facility] [place]) 5 Ortho    I met with Kim and her daughter, Alex and introduced self and oriented to [facility]. Alex shared that her mother has Alzheimer's and likely was unaware of the yes at admissions for spiritual care. Kim had a surgery after a fall and requested time to rest. She is [employer] and appreciated the visit.     Josy Chiu  Chaplain Resident  Pager [phone]    * [facility] remains available 24/7 for emergent requests/referrals, either by having the switchboard page the on-call  or by entering an ASAP/STAT consult in Epic (this will also page the on-call ). Routine Epic consults receive an initial response within 24 hours.*

## 2023-05-22 NOTE — PLAN OF CARE
[id number]    VS: Temp: 97.1  F (36.2  C) Temp src: Oral BP: (!) 140/74 Pulse: 93   Resp: 16 SpO2: 97 % O2 Device: None (Room air)      O2: 97% on room air, denies SOB and CP, no cough present. Lung sounds clear.   Output: Incontinent of bowel & bladder, purewick placed this shift with adequate output   Last BM: 05/21/2023, incontinent BM this shift   Activity: Assist x2 w/ walker & gait belt when OOB, Assist x1-2 for cares and repositioning in bed.   Skin: L) hip surgical incision   Pain: Managed with PRN tylenol and oxycodone   Neuro: A & O x1, pt is disoriented to time, place, and situation. Pt is confused and forgetful, needs constant cues and direction.   Dressing: CDI   Diet: Regular   LDA: PIV SL   Equipment: Personal belongings, call light, walker, gait belt, purewick   Plan: Continue with plan of care   Additional Info: Pt has a menu in her chart with menu items she likes circled on the menu, staff needs to order all meals.

## 2023-05-22 NOTE — PROGRESS NOTES
"  VS: BP (!) 142/71 (BP Location: Left arm)   Pulse 85   Temp (!) 96.5  F (35.8  C) (Oral)   Resp 16   Ht 1.626 m (5' 4\")   Wt 61.2 kg (135 lb)   LMP  (LMP Unknown)   SpO2 100%   BMI 23.17 kg/m     O2: Room air saturations 100%   Output: Pt has pure wick in place for urine output   Last BM:    Activity: Up with use of walker and gait belt. Pt needs a lot of verbal cueing to help with activity and plans of cares.    Skin: Bruising on right arm and left hip incision is CDI   Pain: Pt denied pain when asked.   CMS:    Dressing: Left hip dressing is CDI   Diet: Regular   LDA: IV SL   Equipment: Gait belt , walker, pure wick, briefs for incontinence.    Plan: Awaiting for referrals for placement in rehab.    Additional Info: Pt needs help with ordering her food. Placed menu up on board for staff to order patients preferences. Pt likes talking about being an  and her daughter Alex. Pt resting comfortably between cares.        "

## 2023-05-22 NOTE — PROGRESS NOTES
"Orthopedic Surgery Progress Note: 05/22/2023    Subjective:   No acute events overnight. Pain well-controlled. Tolerating a diet. No new concerns or complaints. Denies SOB, chest Pain, fevers, chills.  Patient is doing exceptionally well.    Ambulated 12ft w/ therapy. Recs below:    Dual recs TCU vs. memory care. Pt may benefit from TCU stay to help progress ind w/ functional mobility as per her reports she was rather ind w/ FWW at her memory care, if pt unable to demonstrate carryover and learning potential w/ therapies then anticipate she can DC back to her memory care w/ increased staff assistance and supervision.    Objective:   /57 (BP Location: Left arm)   Pulse 67   Temp 97  F (36.1  C) (Oral)   Resp 16   Ht 1.626 m (5' 4\")   Wt 61.2 kg (135 lb)   LMP  (LMP Unknown)   SpO2 97%   BMI 23.17 kg/m    I/O this shift:  In: -   Out: 875 [Urine:875]  General: NAD. Resting comfortably in bed.  Respiratory: Breathing comfortably on RA.  Musculoskeletal:    Focused Exam of LEFT Lower Extremity  Dressing C/D/I.   Fires EHL, FHL, TA, GSC, Quad  SILT DP, SP, Saph, Sural, Tibial Nerve Distributions  2+ DP pulse, foot WWP      Laboratory Data:  Lab Results   Component Value Date    WBC 9.5 05/21/2023    HGB 12.0 05/21/2023     (L) 05/21/2023    INR 1.10 05/20/2023         Assessment & Plan:   83-year-old female with closed left valgus impacted femoral neck fracture.     PATIENT IS DNR. NO COMPRESSIONS IF INTRA-OP CARDIAC ARREST.     TODAY:  -Daily PT  -Discharge planning  -PT with: Dual recs TCU vs. memory care. Pt may benefit from TCU stay to help progress ind w/ functional mobility as per her reports she was rather ind w/ FWW at her memory care, if pt unable to demonstrate carryover and learning potential w/ therapies then anticipate she can DC back to her memory care w/ increased staff assistance and supervision.    -Ortho to see weekly going forward; please contact our service if any concerns. "     MEDICINE PrimaryADAT  WBAT LLE  Recommend  mg qDAY for DVT ppx  AM Hgb  PT/OT POD1, OOB with assist     Follow up 2 weeks, Dr. Rey. Repeat films AP pelvis, L hip cross table lateral.     - Disposition: Per primary. Ok to discharge from ortho.     Orthopedic surgery staff for this patient is Dr. Rey.    ------------------------------------------------------------------------------------------    Phillip Sarkar MD  Orthopedic Surgery PGY4  [phone]      FOLLOWUP:    Future Appointments   Date Time Provider Department Center   5/21/2023  7:45 AM UR OT WAITLIST [facility] [place]   5/21/2023  7:45 AM Roxanne Mcdonough, PT [facility] [place]   6/7/2023  1:45 PM Dusty Cole MD [facility]   6/15/2023  8:30 AM Dusty Cole MD [facility]

## 2023-05-23 ENCOUNTER — TELEPHONE (OUTPATIENT)
Dept: ORTHOPEDICS | Facility: CLINIC | Age: 83
End: 2023-05-23

## 2023-05-23 LAB
SODIUM SERPL-SCNC: 131 MMOL/L (ref 136–145)
SODIUM SERPL-SCNC: 132 MMOL/L (ref 136–145)
SODIUM SERPL-SCNC: 132 MMOL/L (ref 136–145)
SODIUM SERPL-SCNC: 134 MMOL/L (ref 136–145)
SODIUM SERPL-SCNC: 135 MMOL/L (ref 136–145)
SODIUM SERPL-SCNC: 137 MMOL/L (ref 136–145)

## 2023-05-23 PROCEDURE — 36415 COLL VENOUS BLD VENIPUNCTURE: CPT | Performed by: STUDENT IN AN ORGANIZED HEALTH CARE EDUCATION/TRAINING PROGRAM

## 2023-05-23 PROCEDURE — 999N000111 HC STATISTIC OT IP EVAL DEFER: Performed by: OCCUPATIONAL THERAPIST

## 2023-05-23 PROCEDURE — 84295 ASSAY OF SERUM SODIUM: CPT | Performed by: STUDENT IN AN ORGANIZED HEALTH CARE EDUCATION/TRAINING PROGRAM

## 2023-05-23 PROCEDURE — 99232 SBSQ HOSP IP/OBS MODERATE 35: CPT | Performed by: INTERNAL MEDICINE

## 2023-05-23 PROCEDURE — 250N000013 HC RX MED GY IP 250 OP 250 PS 637: Performed by: PHYSICIAN ASSISTANT

## 2023-05-23 PROCEDURE — 120N000002 HC R&B MED SURG/OB UMMC

## 2023-05-23 PROCEDURE — 250N000013 HC RX MED GY IP 250 OP 250 PS 637: Performed by: STUDENT IN AN ORGANIZED HEALTH CARE EDUCATION/TRAINING PROGRAM

## 2023-05-23 RX ORDER — ACETAMINOPHEN 325 MG/1
650 TABLET ORAL EVERY 6 HOURS PRN
Qty: 60 TABLET | Refills: 0 | Status: SHIPPED | OUTPATIENT
Start: 2023-05-23 | End: 2023-06-12

## 2023-05-23 RX ORDER — SODIUM PHOSPHATE,MONO-DIBASIC 19G-7G/118
1 ENEMA (ML) RECTAL DAILY
COMMUNITY

## 2023-05-23 RX ORDER — OXYCODONE HYDROCHLORIDE 5 MG/1
2.5 TABLET ORAL EVERY 4 HOURS PRN
Qty: 6 TABLET | Refills: 0 | Status: SHIPPED | OUTPATIENT
Start: 2023-05-23 | End: 2023-06-12

## 2023-05-23 RX ORDER — ASPIRIN 81 MG/1
162 TABLET, CHEWABLE ORAL DAILY
Qty: 28 TABLET | Refills: 0 | Status: SHIPPED | OUTPATIENT
Start: 2023-05-24 | End: 2023-06-12

## 2023-05-23 RX ADMIN — ASPIRIN 81 MG CHEWABLE TABLET 162 MG: 81 TABLET CHEWABLE at 07:59

## 2023-05-23 RX ADMIN — ACETAMINOPHEN 650 MG: 325 TABLET, FILM COATED ORAL at 14:03

## 2023-05-23 RX ADMIN — ACETAMINOPHEN 650 MG: 325 TABLET, FILM COATED ORAL at 20:43

## 2023-05-23 RX ADMIN — LOSARTAN POTASSIUM 50 MG: 50 TABLET, FILM COATED ORAL at 07:59

## 2023-05-23 RX ADMIN — ACETAMINOPHEN 650 MG: 325 TABLET, FILM COATED ORAL at 08:09

## 2023-05-23 RX ADMIN — SIMVASTATIN 40 MG: 40 TABLET, FILM COATED ORAL at 20:46

## 2023-05-23 ASSESSMENT — ACTIVITIES OF DAILY LIVING (ADL)
ADLS_ACUITY_SCORE: 39
ADLS_ACUITY_SCORE: 42
ADLS_ACUITY_SCORE: 39
ADLS_ACUITY_SCORE: 43
ADLS_ACUITY_SCORE: 42
ADLS_ACUITY_SCORE: 39
ADLS_ACUITY_SCORE: 42
ADLS_ACUITY_SCORE: 39
ADLS_ACUITY_SCORE: 39
ADLS_ACUITY_SCORE: 43
DEPENDENT_IADLS:: CLEANING;COOKING;LAUNDRY;MEAL PREPARATION;MEDICATION MANAGEMENT;SHOPPING;MONEY MANAGEMENT;TRANSPORTATION
ADLS_ACUITY_SCORE: 43
ADLS_ACUITY_SCORE: 43

## 2023-05-23 NOTE — CONSULTS
Care Management Initial Consult    General Information  Assessment completed with: Children, Other ([facility] and pt's daughter),  (Pt's daughter, Alex, and [facility] RN, Moira)  Type of CM/SW Visit: Initial Assessment    Primary Care Provider verified and updated as needed: Yes   Readmission within the last 30 days: no previous admission in last 30 days      Reason for Consult: discharge planning  Advance Care Planning: Advance Care Planning Reviewed: no concerns identified          Communication Assessment  Patient's communication style: spoken language (English or Bilingual)    Hearing Difficulty or Deaf: no   Wear Glasses or Blind: no    Cognitive  Cognitive/Neuro/Behavioral: .WDL except  Level of Consciousness: alert  Arousal Level: opens eyes spontaneously  Orientation: disoriented to, time, place  Mood/Behavior: calm, cooperative  Best Language: 0 - No aphasia  Speech: clear, spontaneous, logical    Living Environment:   People in home: alone     Current living Arrangements: assisted living  Name of Facility:  ([facility])   Able to return to prior arrangements: yes       Family/Social Support:  Care provided by: other (see comments) ([facility] staff)  Provides care for: no one, unable/limited ability to care for self  Marital Status: Single  Facility resident(s)/Staff          Description of Support System: Supportive, Involved    Support Assessment: Adequate family and caregiver support    Current Resources:   Patient receiving home care services: No     Community Resources:    Equipment currently used at home: grab bar, toilet, grab bar, tub/shower, walker, standard  Supplies currently used at home: Incontinence Supplies    Employment/Financial:  Employment Status: retired        Financial Concerns: No concerns identified   Referral to Financial Worker: No       Does the patient's insurance plan have a 3 day qualifying hospital stay waiver?  Yes   Will the waiver be used for post-acute placement?  No    Lifestyle & Psychosocial Needs:  Social Determinants of Health     Tobacco Use: Low Risk  (5/22/2023)    Patient History      Smoking Tobacco Use: Never      Smokeless Tobacco Use: Never      Passive Exposure: Not on file   Alcohol Use: Not on file   Financial Resource Strain: Not on file   Food Insecurity: Not on file   Transportation Needs: Not on file   Physical Activity: Not on file   Stress: Not on file   Social Connections: Not on file   Intimate Partner Violence: Not on file   Depression: Not on file   Housing Stability: Not on file       Functional Status:  Prior to admission patient needed assistance:   Dependent ADLs:: Bathing, Dressing, Grooming, Toileting, Ambulation-walker  Dependent IADLs:: Cleaning, Cooking, Laundry, Meal Preparation, Medication Management, Shopping, Money Management, Transportation  Assesssment of Functional Status: At functional baseline    Mental Health Status:  Mental Health Status: No Current Concerns       Chemical Dependency Status:  Chemical Dependency Status: No Current Concerns             Values/Beliefs:  Spiritual, Cultural Beliefs, Mu-ism Practices, Values that affect care:  (Pt is Orthodoxy.)               Additional Information:  Per IDT rounds, pt is medically ready to discharge today.    [facility]   [address]      [facility] [address]  PH: [phone]  Nurse Line [phone]   Fax: [phone]    1000: HEIDE spoke to THOM Pizarro, at [facility], who confirmed that pt will be able to admit today (but nurses are gone after 3:30 p.m.); agreeable to SW calling back to confirm discharge time. Moira provided fax # for SW to send orders to (documented above) and discussed services pt was receiving at [facility]. SW provided update on pt's status, including PT recs.     PLOF:  Pt was IND with transfers and mobility. Received services from [facility] for toileting, bathing, medication management, laundry, dressing, meals, and housekeeping.    1010: SW called pt's  daughter, Alex, who stated she was not notified of pt's progress or that pt was discharging today. Alex expressed her concerns re: pt's refusal to utilize services at [facility]. HEIDE discussed [facility] option for pt in the future, as Alex clarified that pt is not in [facility]. Alex reported that she could not transport pt until after 4:00 p.m. today, gave SW permission to set-up medical transport. HEIDE discussed OOP costs for [facility] WC ride, which Alex was agreeable to.    1100: HEIDE sent HC referral to [facility].    1110: Referrals also sent to [facility], [facility].    1115: Moira called HEIDE, stated that Director of Health Services, Gabriel, is stating that pt cannot admit today; want [facility] to have Care Conference (CC) with family and CM to discuss pt needing a higher level of care. HEIDE reiterated that [facility] will need to take pt back while finding MC and/or higher level of care, as [facility] cannot discharge their resident to the hospital. Moira stated understanding, stated that Gabriel just doesn't want pt to come back today. Moira reported that Gabriel will f/u with HEIDE later today to coordinate CC. Moira requested updated progress notes to be faxed for review.    1145: HEIDE paged Dr. Red, provided update on discharge delay.    1235: HEIDE faxed updated MD, PT, RN notes to [facility].    1435: HEIDE returned call to Moira to discuss progress notes received, left  for a return call.          Althea Alexander, JERRYW, LSW  [facility]   PHONE: [phone]  Pager: [phone]       oral

## 2023-05-23 NOTE — TELEPHONE ENCOUNTER
S/p Left hip closed reduction with percutaneous pinning, DOS: 5/20/23    Pt needs ~2 week follow up with PA and then ~6 week follow up with Dr. Rey. Scheduled for 2.5 week follow up with VIANNEY Escobar and a 4 week follow up with Dr. Rey d/t clinic limited availability. Pt is still IP, will reach out when she is discharged to go over appts.    Eloy Underwood RN

## 2023-05-23 NOTE — PROGRESS NOTES
"   VS: /65   Pulse 97   Temp 96.8  F (36  C) (Oral)   Resp 16   Ht 1.626 m (5' 4\")   Wt 61.2 kg (135 lb)   LMP  (LMP Unknown)   SpO2 96%   BMI 23.17 kg/m         O2:  RA    Output: Incontinent. Wears Purewick. Voiding with good amounts.   Last BM: 5/23   Activity: Up with 1-2 assist, GB and walker. Bed and chair alarm on.     Skin: Bruise on L hip and right fore arm. Surgical incision on left hip.   Pain: L hip pain on transfer, managed with PRN tylenol   CMS: Intact, denies numbness and tingling.   Dressing: CDI    Diet: Regular, tolerating well. Needs help ordering meals.    LDA: PIV SL   Equipment: Personal belongings    Plan: Back to prior living facility, See SW note   Additional Info:               "

## 2023-05-23 NOTE — PLAN OF CARE
Goal Outcome Evaluation:                                    VS: VSS - BP high.   Denies SOB, CP.   O2: O2 SATS >92%   Output: Voids spontaneously and adequately via pure wick   Last BM: Last BM 5/21/23   BS present all x4 quadrants    Activity: Aof1-2 with walker and GB       Skin: L- hip surgical incision   Pain: Managed with prn oxy and tylenol   CMS: AO2  Denies pain    Dressing: Incision dressing CDI    Diet: Regular diet   Pt needs help to order food    LDA: L PIV SL    Equipment: IV Pole, GB, walker, bed alarm    Plan: TBD possible discharge to TCU    Additional Info: Daughter left note for nurse to alert therapy that Kim does have a walker at home and does not need one for discharge.

## 2023-05-23 NOTE — PLAN OF CARE
"VS: BP (!) 144/73 (BP Location: Left arm)   Pulse 107   Temp 96.9  F (36.1  C) (Oral)   Resp 16   Ht 1.626 m (5' 4\")   Wt 61.2 kg (135 lb)   LMP  (LMP Unknown)   SpO2 97%   BMI 23.17 kg/m      O2: O2 SATS >90% denies chest pain,  or SBO   Output: Voids adequately via pure wick incontinent pan in place    Last BM: 5/21/23 BS present all x4 quadrants    Activity: A 1-2 with walker and gait belt   Up for meals? yes   Skin: L) hip surgical incision, bruise to R arm    Pain: Managed with prn oxycodone and tylenol   CMS: Disoriented to time, place, and situation. Pt is confused and forgetful, needs reminder and redirection    Dressing: CDI    Diet: Regular needs help to order food    LDA: L PIV SL    Equipment: IV Pole Gait Belt, walker bed alarm    Plan: TBD possible discharge to TCU    Additional Info:                              "

## 2023-05-23 NOTE — PLAN OF CARE
OT: defer.per chart review and discussion with PT. pt. is approp. for one discipline to follow. Pt. lives in [facility]/memory care and has A at baseline. Per PT no carryover noted pt. Requiring step by step cues during sessions. No acute IP OT needs identified at this time. OT orders completed. PT to continue to follow for any IP therapy needs.

## 2023-05-23 NOTE — PHARMACY-ADMISSION MEDICATION HISTORY
Pharmacist Admission Medication History    Admission medication history is complete. The information provided in this note is only as accurate as the sources available at the time of the update.    Medication reconciliation/reorder completed by provider prior to medication history? Yes    Information Source(s): Facility (U/NH/) medication list/MAR via phone    Changes made to PTA medication list:    Added: glucosamine-chondroitin    Deleted: cefdinir    Changed: None    Allergies reviewed with patient and updates made in EHR: no    Medication History Completed By: Ankit Johnson RPH 5/23/2023 8:20 AM    Prior to Admission medications    Medication Sig Last Dose Taking? Auth Provider Long Term End Date   Calcium Polycarbophil (FIBER) 625 MG tablet Take 625 mg by mouth daily 5/19/2023 at AM Yes Reported, Patient     glucosamine-chondroitin 500-400 MG CAPS per capsule Take 1 capsule by mouth daily 5/19/2023 at AM Yes Unknown, Entered By History     losartan (COZAAR) 50 MG tablet Take 50 mg by mouth daily 5/19/2023 at AM Yes Reported, Patient Yes 8/24/23   multivitamin w/minerals (CERTAVITE/ANTIOXIDANTS) tablet Take 1 tablet by mouth daily 5/19/2023 at AM Yes Reported, Patient     simvastatin (ZOCOR) 40 MG tablet Take 40 mg by mouth daily 5/18/2023 at PM Yes Reported, Patient Yes

## 2023-05-23 NOTE — PLAN OF CARE
Goal Outcome Evaluation:      Plan of Care Reviewed With: patient    Overall Patient Progress: improving    VS: VSS. Denies CP/SOB. Alert, disoriented to place (knows she is in a hospital for recovery but does not know where) and time. Pt is calm and cooperative. Needs lots of verbal cues with cares.    O2: >90% on RA    Output: Voiding adequate amounts, incontinent. Wearing purewick   Last BM: 5/21   Activity: Up with 1-2 assist, GB and walker. Bed and chair alarm on.     Skin: L hip surgical incision   Pain: Pain in L hip, managing with PRN tylenol   CMS: Intact    Dressing: CDI    Diet: Regular, tolerating well. Needs help ordering meals.    LDA: PIV SL   Equipment: Personal belongings    Plan: Back to prior living facility, See SW note   Additional Info: Please remember to order pt's food, menu with what she likes is circled in her room.

## 2023-05-23 NOTE — PROGRESS NOTES
[facility]    Medicine Progress Note - Hospitalist Service, GOLD TEAM 16    Date of Admission:  5/20/2023    Assessment & Plan   Kim Ybarra is a 83 year old female patient with a past medical history significant for HTN, HLD, overactive bladder, GERD, basal cell carcinoma, osteoporosis, frequent falls, recurrent UTIs, cervical spondylosis, Alzheimer's dementia who presented from memory care assisted living to [facility] ED after a mechanical fall at home with left hip pain; found to have femoral neck fracture.     Mechanical fall with resultant Mildly impacted subcapital left femoral neck fracture, POD #1  Trauma work up in ED included CT C/A/P, CT cervical spine, CT head, CT L Hip and XR pelvis with L hip. Findings notable for mildly impacted subcapital left femoral neck fracture, small-moderate deep subcutaneous hematoma posterolateral left hip compatible with soft tissue contusion.Pt scheduled for surgery  5/20/23     Hypernatremia, resolved  Sodium noted to be 155 at admission in setting of poor PO and fall. She received 1L NS in ED.   - Sodium recheck: 140   - IVF while NPO, would likely choose D5 or 1/2 NS     JAYLEN, improving  Creatinine at admission noted to be 1.02. Patient received 1L NS in ED.   - IVF hydration overnight   - Hold ARB     Alzheimer's dementia   - Delirium precautions   - PT, OT and social work consults   - No longer on Aricept     HTN   - Hold PTA Cozaar in the immediate operative setting. Consider resume in 1-2 days.     HLD   - Continue PTA statin     Lung nodules  Few sub 6mm pulmonary nodules noted incidentally on CT C/A/P at admission.   - Will need repeat Chest CT in 12 months     Fluid attenuation anterior mediastinum  Noted incidentally on CT C/A/P at admission.   - Consider MR Chest for better characterization once able to reliably remain still and once femoral fracture repaired.          Diet: Regular Diet Adult    DVT Prophylaxis:  Pneumatic Compression Devices  Dooley Catheter: Not present  Lines: None     Cardiac Monitoring: None  Code Status: No CPR- Do NOT Intubate      Clinically Significant Risk Factors              # Hypoalbuminemia: Lowest albumin = 3.2 g/dL at 5/21/2023  6:42 AM, will monitor as appropriate                     Disposition Plan     Expected Discharge Date: 05/23/2023,  3:00 PM                Michelle Red MD  Hospitalist Service, GOLD TEAM 16  [facility]  Securely message with [employer] (more info)  Text page via [facility] Paging/Directory   See signed in provider for up to date coverage information  ______________________________________________________________________    Interval History   No distress  Work with therapies  Back to [facility]     Physical Exam   Vital Signs: Temp: 97.7  F (36.5  C) Temp src: Oral BP: (!) 169/84 Pulse: 95   Resp: 16 SpO2: 95 % O2 Device: None (Room air)    Weight: 135 lbs 0 oz    General: No acute distress, breathing comfortably on room air  Neuro: EOMI, PERRLA. Facial muscles symmetric, strength/sensation grossly intact.  HEENT: Anicteric sclera. Oropharynx is clear. No lymphadenopathy.  Chest/Lungs: No accessory respiratory muscle use. Adequate air movement throughout. CTAB.  CV: Normal rate, regular rhythm. nl S1/S2. No m/r/g. Cap refill &lt;2s.  Abd: BS+. Soft, NTND.  Ext: Warm, well-perfused. Strong distal pulses.    Medical Decision Making       40 MINUTES SPENT BY ME on the date of service doing chart review, history, exam, documentation & further activities per the note.      Data     I have personally reviewed the following data over the past 24 hrs:    N/A  \   N/A   / N/A     134 (L) N/A N/A /  N/A   N/A N/A N/A \       Imaging results reviewed over the past 24 hrs:   No results found for this or any previous visit (from the past 24 hour(s)).  Recent Labs   Lab 05/23/23  1016 05/23/23  0535 05/23/23  0142 05/21/23  2211 05/21/23  2154  05/21/23  0949 05/21/23  0642 05/20/23  1557 05/20/23  1220 05/20/23  0810   WBC  --   --   --   --   --   --  9.5  --   --  5.6   HGB  --   --   --   --   --   --  12.0  --   --  13.4   MCV  --   --   --   --   --   --  89  --   --  89   PLT  --   --   --   --   --   --  144*  --   --  165   INR  --   --   --   --   --   --   --   --  1.10  --    * 137 135*   < >  --    < > 140  140   < >  --  155*   POTASSIUM  --   --   --   --   --   --  4.3  --   --  4.3   CHLORIDE  --   --   --   --   --   --  109*  --   --  120*   CO2  --   --   --   --   --   --  22  --   --  22   BUN  --   --   --   --   --   --  16.2  --   --  20.3   CR  --   --   --   --   --   --  0.99*  --   --  1.02*   ANIONGAP  --   --   --   --   --   --  9  --   --  13   SUZY  --   --   --   --   --   --  9.4  --   --  9.8   GLC  --   --   --   --  150*  --  134*  --   --  97   ALBUMIN  --   --   --   --   --   --  3.2*  --   --  3.9   PROTTOTAL  --   --   --   --   --   --  5.2*  --   --  6.0*   BILITOTAL  --   --   --   --   --   --  0.3  --   --  0.3   ALKPHOS  --   --   --   --   --   --  72  --   --  76   ALT  --   --   --   --   --   --  22  --   --  33   AST  --   --   --   --   --   --  17  --   --  25    < > = values in this interval not displayed.

## 2023-05-24 LAB
LACTATE SERPL-SCNC: 1.8 MMOL/L (ref 0.7–2)
SODIUM SERPL-SCNC: 132 MMOL/L (ref 136–145)
SODIUM SERPL-SCNC: 132 MMOL/L (ref 136–145)
SODIUM SERPL-SCNC: 133 MMOL/L (ref 136–145)
SODIUM SERPL-SCNC: 134 MMOL/L (ref 136–145)

## 2023-05-24 PROCEDURE — 36416 COLLJ CAPILLARY BLOOD SPEC: CPT | Performed by: STUDENT IN AN ORGANIZED HEALTH CARE EDUCATION/TRAINING PROGRAM

## 2023-05-24 PROCEDURE — 36415 COLL VENOUS BLD VENIPUNCTURE: CPT | Performed by: STUDENT IN AN ORGANIZED HEALTH CARE EDUCATION/TRAINING PROGRAM

## 2023-05-24 PROCEDURE — 250N000013 HC RX MED GY IP 250 OP 250 PS 637: Performed by: PHYSICIAN ASSISTANT

## 2023-05-24 PROCEDURE — 99232 SBSQ HOSP IP/OBS MODERATE 35: CPT | Performed by: INTERNAL MEDICINE

## 2023-05-24 PROCEDURE — 99207 PR CDG-CUT & PASTE-POTENTIAL IMPACT ON LEVEL: CPT | Performed by: INTERNAL MEDICINE

## 2023-05-24 PROCEDURE — 84295 ASSAY OF SERUM SODIUM: CPT | Performed by: STUDENT IN AN ORGANIZED HEALTH CARE EDUCATION/TRAINING PROGRAM

## 2023-05-24 PROCEDURE — 250N000013 HC RX MED GY IP 250 OP 250 PS 637: Performed by: STUDENT IN AN ORGANIZED HEALTH CARE EDUCATION/TRAINING PROGRAM

## 2023-05-24 PROCEDURE — 83605 ASSAY OF LACTIC ACID: CPT | Performed by: INTERNAL MEDICINE

## 2023-05-24 PROCEDURE — 36415 COLL VENOUS BLD VENIPUNCTURE: CPT | Performed by: INTERNAL MEDICINE

## 2023-05-24 PROCEDURE — 120N000002 HC R&B MED SURG/OB UMMC

## 2023-05-24 RX ADMIN — LOSARTAN POTASSIUM 50 MG: 50 TABLET, FILM COATED ORAL at 08:07

## 2023-05-24 RX ADMIN — ACETAMINOPHEN 650 MG: 325 TABLET, FILM COATED ORAL at 15:49

## 2023-05-24 RX ADMIN — SIMVASTATIN 40 MG: 40 TABLET, FILM COATED ORAL at 21:46

## 2023-05-24 RX ADMIN — ACETAMINOPHEN 650 MG: 325 TABLET, FILM COATED ORAL at 21:46

## 2023-05-24 RX ADMIN — ACETAMINOPHEN 650 MG: 325 TABLET, FILM COATED ORAL at 08:07

## 2023-05-24 RX ADMIN — ASPIRIN 81 MG CHEWABLE TABLET 162 MG: 81 TABLET CHEWABLE at 08:07

## 2023-05-24 ASSESSMENT — ACTIVITIES OF DAILY LIVING (ADL)
ADLS_ACUITY_SCORE: 39
ADLS_ACUITY_SCORE: 43
ADLS_ACUITY_SCORE: 39
ADLS_ACUITY_SCORE: 43

## 2023-05-24 NOTE — PLAN OF CARE
"VSS  BP (!) 141/72 (BP Location: Left arm)   Pulse 77   Temp (!) 96.3  F (35.7  C) (Oral)   Resp 18   Ht 1.626 m (5' 4\")   Wt 61.2 kg (135 lb)   LMP  (LMP Unknown)   SpO2 97%   BMI 23.17 kg/m       O2: >90% on RA    Output: Voiding adequate amounts, incontinent. Purewick   Last BM: 5/24/23, fecal incontinence   Activity: Up with 1-2 assist, GB and walker. Bed and chair alarm on. Not OOB this shift     Skin: L hip surgical incision, bruising on forearms   Pain: Pain in L hip, managing with PRN tylenol   CMS: Intact    Dressing: CDI    Diet: Regular   LDA: PIV SL   Plan: TBD, likely back to prior living facilities   Additional Info:  Waiting for decision from the DON of previous living facility for accepatance     "

## 2023-05-24 NOTE — PROVIDER NOTIFICATION
5 Ortho 534 H.D sodium is 132 today. do you still want to check sodium Q4hr? pls advise Sadie [phone]

## 2023-05-24 NOTE — PROGRESS NOTES
VS: VSS. Denies CP/SOB. Alert, disoriented to place, time and situation at times.  Pt is calm and cooperative. Needs lots of verbal cues with cares.    O2: >90% on RA    Output: Voiding adequate amounts, incontinent. Wearing purewick   Last BM: 5/2   Activity: Up with 1-2 assist, GB and walker. Bed and chair alarm on.     Skin: L hip surgical incision   Pain: Pain in L hip, managing with PRN tylenol   CMS: Intact    Dressing: CDI    Diet: Regular, tolerating well. Needs help ordering meals.    LDA: PIV SL   Equipment: Personal belongings    Plan: Back to prior living facility, See SW note   Additional Info: Please remember to order pt's food, menu with what she likes is circled in her room.

## 2023-05-24 NOTE — PROGRESS NOTES
Care Management Follow Up    Length of Stay (days): 4    Expected Discharge Date: 05/23/2023     Concerns to be Addressed: discharge planning     Patient plan of care discussed at interdisciplinary rounds: Yes    Anticipated Discharge Disposition: Assisted Living    [facility]   [address]      [facility] [address]  PH: [phone]  Nurse Line [phone]   Fax: [phone]     Anticipated Discharge Services:  TBD  Anticipated Discharge DME: None    Patient/family educated on Medicare website which has current facility and service quality ratings: no  Education Provided on the Discharge Plan: Yes  Patient/Family in Agreement with the Plan: yes    Referrals Placed by CM/SW: N/A  Private pay costs discussed: Not applicable    Additional Information:  Pt medically ready to return to [facility]. Per chart review, FRANTZ (Gabriel) requesting that there be a Care Conference with family and pt's CM to discuss higher level of care.     HEIDE left VM with Elva from [facility] (ph: [phone]) reporting pt is medically ready to discharge back today and requesting call back to coordinate.     ADDENDUM 1350: HEIDE called RN Line (ph: [phone]) at [facility] and spoke with rep who reported that SW would need to speak with their DON (Gabriel) to coordinate discharge. Rep provided HEIDE with FRANTZ's phone number ([phone]).     HEIDE left VM for Gabriel (FRANTZ, ph: [phone]) requesting call back to discuss coordinating pt's discharge back to [facility].     MONIQUE Ramesh  [facility]   [facility]   5 Ortho  Coverage  5O SW Ph: [phone]

## 2023-05-24 NOTE — PROGRESS NOTES
[facility]    Medicine Progress Note - Hospitalist Service, GOLD TEAM 16    Date of Admission:  5/20/2023    Assessment & Plan   Kim Ybarra is a 83 year old female patient with a past medical history significant for HTN, HLD, overactive bladder, GERD, basal cell carcinoma, osteoporosis, frequent falls, recurrent UTIs, cervical spondylosis, Alzheimer's dementia who presented from memory care assisted living to [facility] ED after a mechanical fall at home with left hip pain; found to have femoral neck fracture.     Mechanical fall with resultant Mildly impacted subcapital left femoral neck fracture, POD #1  Trauma work up in ED included CT C/A/P, CT cervical spine, CT head, CT L Hip and XR pelvis with L hip. Findings notable for mildly impacted subcapital left femoral neck fracture, small-moderate deep subcutaneous hematoma posterolateral left hip compatible with soft tissue contusion.Pt scheduled for surgery  5/20/23     Hypernatremia, resolved  Sodium noted to be 155 at admission in setting of poor PO and fall. She received 1L NS in ED.   - Sodium recheck: 140   - IVF while NPO, would likely choose D5 or 1/2 NS     JAYLEN, improving  Creatinine at admission noted to be 1.02. Patient received 1L NS in ED.   - IVF hydration overnight   - Hold ARB     Alzheimer's dementia   - Delirium precautions   - PT, OT and social work consults   - No longer on Aricept     HTN   - Hold PTA Cozaar in the immediate operative setting. Consider resume in 1-2 days.     HLD   - Continue PTA statin     Lung nodules  Few sub 6mm pulmonary nodules noted incidentally on CT C/A/P at admission.   - Will need repeat Chest CT in 12 months     Fluid attenuation anterior mediastinum  Noted incidentally on CT C/A/P at admission.   - Consider MR Chest for better characterization once able to reliably remain still and once femoral fracture repaired.          Diet: Regular Diet Adult  Diet    DVT  Prophylaxis: Pneumatic Compression Devices  Dooley Catheter: Not present  Lines: None     Cardiac Monitoring: None  Code Status: No CPR- Do NOT Intubate      Clinically Significant Risk Factors              # Hypoalbuminemia: Lowest albumin = 3.2 g/dL at 5/21/2023  6:42 AM, will monitor as appropriate                     Disposition Plan      Expected Discharge Date: 05/24/2023    Discharge Delays: Other (Add Comment)  Destination: assisted living            Michelle Red MD  Hospitalist Service, GOLD TEAM 16  [facility]  Securely message with [employer] (more info)  Text page via [facility] Paging/Directory   See signed in provider for up to date coverage information  ______________________________________________________________________    Interval History   No distress  Doing okay  Likely back to facility today     Physical Exam   Vital Signs: Temp: 96.8  F (36  C) Temp src: Oral BP: 136/76 Pulse: 84   Resp: 14 SpO2: 95 % O2 Device: None (Room air)    Weight: 135 lbs 0 oz    General: No acute distress, breathing comfortably on room air  Neuro: EOMI, PERRLA. Facial muscles symmetric, strength/sensation grossly intact.  HEENT: Anicteric sclera. Oropharynx is clear. No lymphadenopathy.  Chest/Lungs: No accessory respiratory muscle use. Adequate air movement throughout. CTAB.  CV: Normal rate, regular rhythm. nl S1/S2. No m/r/g. Cap refill &lt;2s.  Abd: BS+. Soft, NTND.  Ext: Warm, well-perfused. Strong distal pulses.    Medical Decision Making       40 MINUTES SPENT BY ME on the date of service doing chart review, history, exam, documentation & further activities per the note.      Data     I have personally reviewed the following data over the past 24 hrs:    N/A  \   N/A   / N/A     132 (L) N/A N/A /  N/A   N/A N/A N/A \       Procal: N/A CRP: N/A Lactic Acid: 1.8         Imaging results reviewed over the past 24 hrs:   No results found for this or any previous visit  (from the past 24 hour(s)).  Recent Labs   Lab 05/24/23  0933 05/24/23  0606 05/24/23  0159 05/21/23  2211 05/21/23  2157 05/21/23  0949 05/21/23  0642 05/20/23  1557 05/20/23  1220 05/20/23  0810   WBC  --   --   --   --   --   --  9.5  --   --  5.6   HGB  --   --   --   --   --   --  12.0  --   --  13.4   MCV  --   --   --   --   --   --  89  --   --  89   PLT  --   --   --   --   --   --  144*  --   --  165   INR  --   --   --   --   --   --   --   --  1.10  --    * 134* 133*   < >  --    < > 140  140   < >  --  155*   POTASSIUM  --   --   --   --   --   --  4.3  --   --  4.3   CHLORIDE  --   --   --   --   --   --  109*  --   --  120*   CO2  --   --   --   --   --   --  22  --   --  22   BUN  --   --   --   --   --   --  16.2  --   --  20.3   CR  --   --   --   --   --   --  0.99*  --   --  1.02*   ANIONGAP  --   --   --   --   --   --  9  --   --  13   SUZY  --   --   --   --   --   --  9.4  --   --  9.8   GLC  --   --   --   --  150*  --  134*  --   --  97   ALBUMIN  --   --   --   --   --   --  3.2*  --   --  3.9   PROTTOTAL  --   --   --   --   --   --  5.2*  --   --  6.0*   BILITOTAL  --   --   --   --   --   --  0.3  --   --  0.3   ALKPHOS  --   --   --   --   --   --  72  --   --  76   ALT  --   --   --   --   --   --  22  --   --  33   AST  --   --   --   --   --   --  17  --   --  25    < > = values in this interval not displayed.

## 2023-05-24 NOTE — PLAN OF CARE
VS: VSS.     O2: >90% on RA    Output: Voiding adequate amounts, incontinent. Purewick   Last BM: 5/23   Activity: Up with 1-2 assist, GB and walker. Bed and chair alarm on. Not OOB this shift     Skin: L hip surgical incision, bruising on forearms   Pain: Pain in L hip, managing with PRN tylenol   CMS: Intact    Dressing: CDI    Diet: Regular   LDA: PIV SL   Plan: TBD, likely back to prior living facilities   Additional Info:

## 2023-05-25 ENCOUNTER — APPOINTMENT (OUTPATIENT)
Dept: PHYSICAL THERAPY | Facility: CLINIC | Age: 83
DRG: 481 | End: 2023-05-25
Payer: COMMERCIAL

## 2023-05-25 LAB — LACTATE SERPL-SCNC: 1.1 MMOL/L (ref 0.7–2)

## 2023-05-25 PROCEDURE — 36415 COLL VENOUS BLD VENIPUNCTURE: CPT | Performed by: INTERNAL MEDICINE

## 2023-05-25 PROCEDURE — 83605 ASSAY OF LACTIC ACID: CPT | Performed by: INTERNAL MEDICINE

## 2023-05-25 PROCEDURE — 97530 THERAPEUTIC ACTIVITIES: CPT | Mod: GP | Performed by: PHYSICAL THERAPIST

## 2023-05-25 PROCEDURE — 99207 PR CDG-CUT & PASTE-POTENTIAL IMPACT ON LEVEL: CPT | Performed by: INTERNAL MEDICINE

## 2023-05-25 PROCEDURE — 250N000013 HC RX MED GY IP 250 OP 250 PS 637: Performed by: PHYSICIAN ASSISTANT

## 2023-05-25 PROCEDURE — 250N000013 HC RX MED GY IP 250 OP 250 PS 637: Performed by: STUDENT IN AN ORGANIZED HEALTH CARE EDUCATION/TRAINING PROGRAM

## 2023-05-25 PROCEDURE — 99232 SBSQ HOSP IP/OBS MODERATE 35: CPT | Performed by: INTERNAL MEDICINE

## 2023-05-25 PROCEDURE — 120N000002 HC R&B MED SURG/OB UMMC

## 2023-05-25 RX ADMIN — ASPIRIN 81 MG CHEWABLE TABLET 162 MG: 81 TABLET CHEWABLE at 08:16

## 2023-05-25 RX ADMIN — SIMVASTATIN 40 MG: 40 TABLET, FILM COATED ORAL at 20:22

## 2023-05-25 RX ADMIN — ACETAMINOPHEN 650 MG: 325 TABLET, FILM COATED ORAL at 20:22

## 2023-05-25 RX ADMIN — ACETAMINOPHEN 650 MG: 325 TABLET, FILM COATED ORAL at 12:38

## 2023-05-25 RX ADMIN — LOSARTAN POTASSIUM 50 MG: 50 TABLET, FILM COATED ORAL at 08:17

## 2023-05-25 RX ADMIN — ACETAMINOPHEN 650 MG: 325 TABLET, FILM COATED ORAL at 02:57

## 2023-05-25 ASSESSMENT — ACTIVITIES OF DAILY LIVING (ADL)
ADLS_ACUITY_SCORE: 39
ADLS_ACUITY_SCORE: 43
ADLS_ACUITY_SCORE: 39
ADLS_ACUITY_SCORE: 39
ADLS_ACUITY_SCORE: 43
ADLS_ACUITY_SCORE: 39
ADLS_ACUITY_SCORE: 43

## 2023-05-25 NOTE — PLAN OF CARE
VS: Alert, disoriented to time and place. Pt states she is at the long term care facility but knows she is here for hip fx. Pt is calm and cooperative. Needs a lot of verbal cues and reminders.      O2: >90% on RA, Lungs clear and equal bilaterally     Output: Voiding adequate amounts with purewick. Incontinent.     Last BM: Reported from NST, 3 stools during shift.   1750 - Loose stool.     Activity: Up with 1-2 assist, GB and walker. Bed and chair alarm on. PT here today, stated she did assist of 1.      Up for meals? Ate breakfast before writer went into pt room.   Pt ate lunch in bed due to being tired and having PT in a couple of hours.      Skin: L hip surgical incision. Bruising on L hip and forearms.  Scab on R shin.  Bruising on the R wrist and hand.     Pain: Pain in the L hip, managing with PRN tylenol     CMS: Intact     Dressing: Bandage on left hip changed due to being soiled. Ortho approved change.  New bandage: CDI     Diet: Regular, tolerating well. Needs help ordering meals. Ate breakfast and lunch.   Pt denied dinner. Ordered dinner just in case pt changed her mind.   LDA: PIV L wrist.  Flushed without complication and denies pain.  No signs of infiltration or irritation.     Equipment: Gait belt, IV pole/pump, purewick, personal belongings,      Plan: TBD, mostly not until next week.  Trying to find placement at a memory care facility.     Additional Info: Daughter here today for a little bit, expressed no concerns and was wondering about PT - Here today

## 2023-05-25 NOTE — PLAN OF CARE
VS: VSS. Denies CP/SOB. Alert, disoriented to time and situation at times, pt sometimes knows she's at the hospital.  Pt is calm and cooperative. Needs lots of verbal cues with cares.    O2: >90% on RA    Output: Voiding adequate amounts with purewick   Last BM: 5/24, incontinent of stool   Activity: Up with 1-2 assist, GB and walker. Bed and chair alarm on.     Skin: L hip surgical incision   Pain: Pain in L hip, managing with PRN tylenol   CMS: Intact    Dressing: CDI    Diet: Regular, tolerating well. Needs help ordering meals.    LDA: PIV SL   Plan: Back to prior living facility when medically stable   Additional Info:

## 2023-05-25 NOTE — PROGRESS NOTES
[facility]    Medicine Progress Note - Hospitalist Service, GOLD TEAM 16    Date of Admission:  5/20/2023    Assessment & Plan   Kim Ybarra is a 83 year old female patient with a past medical history significant for HTN, HLD, overactive bladder, GERD, basal cell carcinoma, osteoporosis, frequent falls, recurrent UTIs, cervical spondylosis, Alzheimer's dementia who presented from memory care assisted living to [facility] ED after a mechanical fall at home with left hip pain; found to have femoral neck fracture.     Mechanical fall with resultant Mildly impacted subcapital left femoral neck fracture, POD #1  Trauma work up in ED included CT C/A/P, CT cervical spine, CT head, CT L Hip and XR pelvis with L hip. Findings notable for mildly impacted subcapital left femoral neck fracture, small-moderate deep subcutaneous hematoma posterolateral left hip compatible with soft tissue contusion.Pt scheduled for surgery  5/20/23     Hypernatremia, resolved  Sodium noted to be 155 at admission in setting of poor PO and fall. She received 1L NS in ED.   - Sodium recheck: 140   - IVF while NPO, would likely choose D5 or 1/2 NS     JAYLEN, improving  Creatinine at admission noted to be 1.02. Patient received 1L NS in ED.   - IVF hydration overnight   - Hold ARB     Alzheimer's dementia   - Delirium precautions   - PT, OT and social work consults   - No longer on Aricept     HTN   - Hold PTA Cozaar in the immediate operative setting. Consider resume in 1-2 days.     HLD   - Continue PTA statin     Lung nodules  Few sub 6mm pulmonary nodules noted incidentally on CT C/A/P at admission.   - Will need repeat Chest CT in 12 months     Fluid attenuation anterior mediastinum  Noted incidentally on CT C/A/P at admission.   - Consider MR Chest for better characterization once able to reliably remain still and once femoral fracture repaired.          Diet: Regular Diet Adult  Diet    DVT  Prophylaxis: Pneumatic Compression Devices  Dooley Catheter: Not present  Lines: None     Cardiac Monitoring: None  Code Status: No CPR- Do NOT Intubate      Clinically Significant Risk Factors              # Hypoalbuminemia: Lowest albumin = 3.2 g/dL at 5/21/2023  6:42 AM, will monitor as appropriate                     Disposition Plan           Michelle Red MD  Hospitalist Service, GOLD TEAM 16  [facility]  Securely message with [employer] (more info)  Text page via [employer] Paging/Directory   See signed in provider for up to date coverage information  ______________________________________________________________________    Interval History   Feeling okay  No chest pain  Tired  No SOB    Physical Exam   Vital Signs: Temp: (!) 96.4  F (35.8  C) Temp src: Oral BP: (!) 155/82 Pulse: 87   Resp: 16 SpO2: 90 % O2 Device: None (Room air)    Weight: 135 lbs 0 oz    General: No acute distress, breathing comfortably on room air  Neuro: EOMI, PERRLA. Facial muscles symmetric, strength/sensation grossly intact.  HEENT: Anicteric sclera. Oropharynx is clear. No lymphadenopathy.  Chest/Lungs: No accessory respiratory muscle use. Adequate air movement throughout. CTAB.  CV: Normal rate, regular rhythm. nl S1/S2. No m/r/g. Cap refill &lt;2s.  Abd: BS+. Soft, NTND.  Ext: Warm, well-perfused. Strong distal pulses.    Medical Decision Making       40 MINUTES SPENT BY ME on the date of service doing chart review, history, exam, documentation & further activities per the note.      Data     I have personally reviewed the following data over the past 24 hrs:    N/A  \   N/A   / N/A     N/A N/A N/A /  N/A   N/A N/A N/A \       Imaging results reviewed over the past 24 hrs:   No results found for this or any previous visit (from the past 24 hour(s)).  Recent Labs   Lab 05/24/23  1334 05/24/23  0933 05/24/23  0606 05/21/23  2211 05/21/23  2157 05/21/23  0949 05/21/23  0642 05/20/23  1557  05/20/23  1220 05/20/23  0810   WBC  --   --   --   --   --   --  9.5  --   --  5.6   HGB  --   --   --   --   --   --  12.0  --   --  13.4   MCV  --   --   --   --   --   --  89  --   --  89   PLT  --   --   --   --   --   --  144*  --   --  165   INR  --   --   --   --   --   --   --   --  1.10  --    * 132* 134*   < >  --    < > 140  140   < >  --  155*   POTASSIUM  --   --   --   --   --   --  4.3  --   --  4.3   CHLORIDE  --   --   --   --   --   --  109*  --   --  120*   CO2  --   --   --   --   --   --  22  --   --  22   BUN  --   --   --   --   --   --  16.2  --   --  20.3   CR  --   --   --   --   --   --  0.99*  --   --  1.02*   ANIONGAP  --   --   --   --   --   --  9  --   --  13   SUZY  --   --   --   --   --   --  9.4  --   --  9.8   GLC  --   --   --   --  150*  --  134*  --   --  97   ALBUMIN  --   --   --   --   --   --  3.2*  --   --  3.9   PROTTOTAL  --   --   --   --   --   --  5.2*  --   --  6.0*   BILITOTAL  --   --   --   --   --   --  0.3  --   --  0.3   ALKPHOS  --   --   --   --   --   --  72  --   --  76   ALT  --   --   --   --   --   --  22  --   --  33   AST  --   --   --   --   --   --  17  --   --  25    < > = values in this interval not displayed.

## 2023-05-25 NOTE — PROVIDER NOTIFICATION
Notified Dr. Adama luke on call that pt had small amount of stool on distal part of dressing due to incontinence episode. Gave RN OK to change surgical dressing.

## 2023-05-25 NOTE — PROVIDER NOTIFICATION
Notified Dr. Abraham that pt triggered sepsis protocol, most likely due to HR and temperature readings. Pt does not appear septic. VS taken, HR taken manually. Lactic acid ordered, will follow up with medicine if needed. Lactic acid back at 1.1. No further interventions needed.

## 2023-05-26 PROCEDURE — 99232 SBSQ HOSP IP/OBS MODERATE 35: CPT | Performed by: INTERNAL MEDICINE

## 2023-05-26 PROCEDURE — 250N000013 HC RX MED GY IP 250 OP 250 PS 637: Performed by: PHYSICIAN ASSISTANT

## 2023-05-26 PROCEDURE — 120N000002 HC R&B MED SURG/OB UMMC

## 2023-05-26 PROCEDURE — 250N000013 HC RX MED GY IP 250 OP 250 PS 637: Performed by: STUDENT IN AN ORGANIZED HEALTH CARE EDUCATION/TRAINING PROGRAM

## 2023-05-26 RX ADMIN — ACETAMINOPHEN 650 MG: 325 TABLET, FILM COATED ORAL at 16:20

## 2023-05-26 RX ADMIN — SIMVASTATIN 40 MG: 40 TABLET, FILM COATED ORAL at 20:45

## 2023-05-26 RX ADMIN — LOSARTAN POTASSIUM 50 MG: 50 TABLET, FILM COATED ORAL at 08:06

## 2023-05-26 RX ADMIN — ASPIRIN 81 MG CHEWABLE TABLET 162 MG: 81 TABLET CHEWABLE at 08:06

## 2023-05-26 RX ADMIN — ACETAMINOPHEN 650 MG: 325 TABLET, FILM COATED ORAL at 05:11

## 2023-05-26 ASSESSMENT — ACTIVITIES OF DAILY LIVING (ADL)
ADLS_ACUITY_SCORE: 39
ADLS_ACUITY_SCORE: 38
ADLS_ACUITY_SCORE: 39

## 2023-05-26 NOTE — PLAN OF CARE
"  VS: /60 (BP Location: Left arm)   Pulse 96   Temp (!) 96.7  F (35.9  C) (Oral)   Resp 16   Ht 1.626 m (5' 4\")   Wt 61.2 kg (135 lb)   LMP  (LMP Unknown)   SpO2 97%   BMI 23.17 kg/m    Pt denies chest pain.    O2: >90% on RA. Lung sounds clear and equal bilaterally.    Output: Incontinent, purewick in place.    Last BM: Incontinent 5/26/23   Activity: Ax1-2 with walker and gait belt when OOB. Ax1 for cares in bed.    Up for meals? N/A   Skin: L hip surgical incision. Bruising to R wrist, and scab to R shin.    Pain: Managed with PRN Tylenol and Ice packs.    CMS: Intact pt denies numbness or tingling. Pt has intermittent confusion. Disoriented to time and situation.    Dressing: L hip - CDI    Diet: Regular, pt needs help ordering food.    LDA: PIV L wrist - SL   Equipment: IV pole/pump, walker, GB, and personal belongings.    Plan: Pending placement back to [facility] per  previous note.    Additional Info:        "

## 2023-05-26 NOTE — PLAN OF CARE
"VS: /66 (BP Location: Left arm, Patient Position: Semi-Jaime's, Cuff Size: Adult Regular)   Pulse 82   Temp 96.8  F (36  C) (Oral)   Resp 16   Ht 1.626 m (5' 4\")   Wt 61.2 kg (135 lb)   LMP  (LMP Unknown)   SpO2 95%   BMI 23.17 kg/m     O2: O2> 95% ORA, denies feeling SOB, lightheadedness   Output: Pt incontinent, purewick in place    Last BM: 5/26, incontinent    Activity: Ax1 w/ walker and gb, generalized weakness - was able to ambulate short distance to bathroom, SBA     Ambulated w/ author in hallway - walked 50 feet today   Up for meals? Sits up for meals    Skin: L hip sugical incision, R wrist bruising and scab present   Pain: Reports pain to her L hip, managed w/ prn tylenol and ice packs  Has oxycodone available    CMS: Oriented to self, AOx1-2 Bed/Chair Alarms on  Denies numbness and tingling    Dressing: L hip dressing CDI    Diet: Reg, denies nausea/vomiting - needs assistance w/ ordering meals   LDA: L forearm PIV SL   Plan: Pending placement, per  note, anticipated discharge to assisted living [facility]. TBD   Continue POC    Additional Info:           "

## 2023-05-26 NOTE — PROGRESS NOTES
Care Management Follow Up    Length of Stay (days): 6    Expected Discharge Date: 05/31/23     Concerns to be Addressed: discharge planning     Patient plan of care discussed at interdisciplinary rounds: Yes    Anticipated Discharge Disposition: LTC now recommended     Anticipated Discharge Services: I/ADL assistance from facility staff  Anticipated Discharge DME: None    Patient/family educated on Medicare website which has current facility and service quality ratings: no  Education Provided on the Discharge Plan: Yes  Patient/Family in Agreement with the Plan: yes    Referrals Placed by CM/SW:  (Home Health Services previously sent when pt was expected to return to [facility])  Private pay costs discussed: private room/amenity fees (MA for LTC eligibility; cost of care at SNF).    Additional Information:    HEIDE spoke to PEPPER Rios at [facility], who stated that pt is not in [facility]; updated progress notes reviewed, and [facility] cannot provide A1-2 (pt was IND with transfers and mobility PTA). [facility] requested higher level of care, like TCU or MC for pt. SW informed that if TCU were recommended for pt, then CM will pursue TCU; however, [facility] and family will need to find MC for pt, while [facility] adjusts their care plan to meet pt's needs in the meantime. [facility] expressed understanding of [facility]'s role in looking for higher level of care while taking care of pt, but reiterated that [facility] does not have the staff to accommodate pt's current A1-2.    [facility]   [address]      [facility] [address]  PH: [phone]  Nurse Line [phone]  [facility]: [phone] ([facility])   Fax: [phone]      SW discussed the above information with PT, who confirmed that there is no TCU rec r/t pt's cognitive deficits; pt demonstrates poor carryover from previous PT sessions.    SW spoke to pt's daughter, Alex, re: all of the above information. Alex agreed that pt needs a high-level of care; also reported that she has moved pt 3 times in the last 3  years to accommodate pt's needs. Alex stated that she has been fighting with pt's LTC Insurance to process/approve pt's claim, and wonders how another move will effect the progress thus far. SW informed Alex that the Senior Linkage Line (SLL) may be able to help direct her in this situation. SW provided education on different levels of care (private pay HC agency to supplement care at [facility]; ; and SNF) along with MA for LTC services eligibility. SW reiterated that in order to direct alternative placement options, understanding pt's financial status is imperative. Alex reported that pt is paying ~$8,000/month for current care; at that rate, pt's savings will be gone in 5 months. Alex stated that pt will then need to liquidate her annuities in order to spend down money before qualifying for MA for LTC services. SW also provided education on the benefit of speaking to an Elder Law  to better prepare for MA for LTC services. Alex was agreeable to working with  [facility] or [facility] to assist with placement if needed. Alex agreeable to taking the weekend to process the information SW provided and decide on a direction for pt's care. Then, next week SW can provide the resources needed to establish/plan next steps.        BRANDEN Blanco, LSW  [facility]   PHONE: [phone]  Pager: [phone]

## 2023-05-26 NOTE — PROGRESS NOTES
[facility]    Medicine Progress Note - Hospitalist Service, GOLD TEAM 16    Date of Admission:  5/20/2023    Assessment & Plan   Kim Ybarra is a 83 year old female patient with a past medical history significant for HTN, HLD, overactive bladder, GERD, basal cell carcinoma, osteoporosis, frequent falls, recurrent UTIs, cervical spondylosis, Alzheimer's dementia who presented from memory care assisted living to [facility] ED after a mechanical fall at home with left hip pain; found to have femoral neck fracture.    Today's changes: 5/26/2023  Doing well.   No new concern.   Aw placement.      Mechanical fall with resultant Mildly impacted subcapital left femoral neck fracture, POD #1  Trauma work up in ED included CT C/A/P, CT cervical spine, CT head, CT L Hip and XR pelvis with L hip. Findings notable for mildly impacted subcapital left femoral neck fracture, small-moderate deep subcutaneous hematoma posterolateral left hip compatible with soft tissue contusion.Pt scheduled for surgery  5/20/23     Hypernatremia, resolved  Sodium noted to be 155 at admission in setting of poor PO and fall. She received 1L NS in ED.   - Sodium recheck: 140   - IVF while NPO, would likely choose D5 or 1/2 NS     JAYLEN, improving  Creatinine at admission noted to be 1.02. Patient received 1L NS in ED.   - Hold ARB     Alzheimer's dementia   - Delirium precautions   - PT, OT and social work consults   - No longer on Aricept     HTN   - Hold PTA Cozaar in the immediate operative setting. Consider resume in 1-2 days.     HLD   - Continue PTA statin     Lung nodules  Few sub 6mm pulmonary nodules noted incidentally on CT C/A/P at admission.   - Will need repeat Chest CT in 12 months     Fluid attenuation anterior mediastinum  Noted incidentally on CT C/A/P at admission.   - Consider MR Chest for better characterization once able to reliably remain still and once femoral fracture  repaired.          Diet: Regular Diet Adult  Diet    DVT Prophylaxis: Pneumatic Compression Devices  Dooley Catheter: Not present  Lines: None     Cardiac Monitoring: None  Code Status: No CPR- Do NOT Intubate      Clinically Significant Risk Factors              # Hypoalbuminemia: Lowest albumin = 3.2 g/dL at 5/21/2023  6:42 AM, will monitor as appropriate                     Disposition Plan      Expected Discharge Date: 05/28/2023    Discharge Delays: Other (Add Comment)  Destination: assisted living  Discharge Comments: From [facility].  They want a care conference before they'll accept her back.        Narayan Mcleod MD  Hospitalist Service, GOLD TEAM 16  [facility]  Securely message with [employer] (more info)  Text page via [facility] Paging/Directory   See signed in provider for up to date coverage information  ______________________________________________________________________    Interval History   Interval events reviewed.   States doing well  Denies fever or chills.   No cough or cp or sob.   No NV or pain abdomen.   No new sensory or motor complaint.     No other new or acute medical concern      Physical Exam   Vital Signs: Temp: (!) 96.7  F (35.9  C) Temp src: Oral BP: 131/60 Pulse: 85   Resp: 16 SpO2: 97 % O2 Device: None (Room air)    Weight: 135 lbs 0 oz    General: No acute distress, breathing comfortably on room air  Neuro: EOMI, PERRLA. grossly intact.  HEENT: Anicteric sclera. Oropharynx is clear.   Chest/Lungs: No accessory respiratory muscle use. Adequate air movement throughout. CTAB.  CV: Normal rate, regular rhythm. nl S1/S2.   Abd:  Soft, NT, ND.  Ext: Warm, well-perfused.     Medical Decision Making       40 MINUTES SPENT BY ME on the date of service doing chart review, history, exam, documentation & further activities per the note.      Data     I have personally reviewed the following data over the past 24 hrs:    Procal: N/A CRP: N/A Lactic  Acid: 1.1         Imaging results reviewed over the past 24 hrs:   No results found for this or any previous visit (from the past 24 hour(s)).  Recent Labs   Lab 05/24/23  1334 05/24/23  0933 05/24/23  0606 05/21/23  2211 05/21/23  2157 05/21/23  0949 05/21/23  0642 05/20/23  1557 05/20/23  1220 05/20/23  0810   WBC  --   --   --   --   --   --  9.5  --   --  5.6   HGB  --   --   --   --   --   --  12.0  --   --  13.4   MCV  --   --   --   --   --   --  89  --   --  89   PLT  --   --   --   --   --   --  144*  --   --  165   INR  --   --   --   --   --   --   --   --  1.10  --    * 132* 134*   < >  --    < > 140  140   < >  --  155*   POTASSIUM  --   --   --   --   --   --  4.3  --   --  4.3   CHLORIDE  --   --   --   --   --   --  109*  --   --  120*   CO2  --   --   --   --   --   --  22  --   --  22   BUN  --   --   --   --   --   --  16.2  --   --  20.3   CR  --   --   --   --   --   --  0.99*  --   --  1.02*   ANIONGAP  --   --   --   --   --   --  9  --   --  13   SUZY  --   --   --   --   --   --  9.4  --   --  9.8   GLC  --   --   --   --  150*  --  134*  --   --  97   ALBUMIN  --   --   --   --   --   --  3.2*  --   --  3.9   PROTTOTAL  --   --   --   --   --   --  5.2*  --   --  6.0*   BILITOTAL  --   --   --   --   --   --  0.3  --   --  0.3   ALKPHOS  --   --   --   --   --   --  72  --   --  76   ALT  --   --   --   --   --   --  22  --   --  33   AST  --   --   --   --   --   --  17  --   --  25    < > = values in this interval not displayed.

## 2023-05-27 LAB
ANION GAP SERPL CALCULATED.3IONS-SCNC: 9 MMOL/L (ref 7–15)
BUN SERPL-MCNC: 40.2 MG/DL (ref 8–23)
CALCIUM SERPL-MCNC: 9.6 MG/DL (ref 8.8–10.2)
CHLORIDE SERPL-SCNC: 102 MMOL/L (ref 98–107)
CREAT SERPL-MCNC: 1.2 MG/DL (ref 0.51–0.95)
DEPRECATED HCO3 PLAS-SCNC: 24 MMOL/L (ref 22–29)
GFR SERPL CREATININE-BSD FRML MDRD: 45 ML/MIN/1.73M2
GLUCOSE SERPL-MCNC: 98 MG/DL (ref 70–99)
HOLD SPECIMEN: NORMAL
POTASSIUM SERPL-SCNC: 4.7 MMOL/L (ref 3.4–5.3)
SODIUM SERPL-SCNC: 135 MMOL/L (ref 136–145)

## 2023-05-27 PROCEDURE — 99232 SBSQ HOSP IP/OBS MODERATE 35: CPT | Performed by: INTERNAL MEDICINE

## 2023-05-27 PROCEDURE — 250N000013 HC RX MED GY IP 250 OP 250 PS 637: Performed by: PHYSICIAN ASSISTANT

## 2023-05-27 PROCEDURE — 80048 BASIC METABOLIC PNL TOTAL CA: CPT | Performed by: INTERNAL MEDICINE

## 2023-05-27 PROCEDURE — 120N000002 HC R&B MED SURG/OB UMMC

## 2023-05-27 PROCEDURE — 250N000013 HC RX MED GY IP 250 OP 250 PS 637: Performed by: STUDENT IN AN ORGANIZED HEALTH CARE EDUCATION/TRAINING PROGRAM

## 2023-05-27 PROCEDURE — 36415 COLL VENOUS BLD VENIPUNCTURE: CPT | Performed by: INTERNAL MEDICINE

## 2023-05-27 RX ADMIN — ASPIRIN 81 MG CHEWABLE TABLET 162 MG: 81 TABLET CHEWABLE at 07:33

## 2023-05-27 RX ADMIN — ACETAMINOPHEN 650 MG: 325 TABLET, FILM COATED ORAL at 07:33

## 2023-05-27 RX ADMIN — SIMVASTATIN 40 MG: 40 TABLET, FILM COATED ORAL at 20:10

## 2023-05-27 RX ADMIN — LOSARTAN POTASSIUM 50 MG: 50 TABLET, FILM COATED ORAL at 07:33

## 2023-05-27 ASSESSMENT — ACTIVITIES OF DAILY LIVING (ADL)
ADLS_ACUITY_SCORE: 38

## 2023-05-27 NOTE — PLAN OF CARE
Pt is disorient to situation, time, and place. Bed alarm. VSS. LS clear, on RA. BS active, LBM 5/24/2023. Pt is incontinent of bladder and bowel. Purewick in place. L hip surgical dressing is c/d/I. L PIV is patent and SL. Pt up with 2 assist with walker. Continue to monitor

## 2023-05-27 NOTE — PLAN OF CARE
"VS: /65 (BP Location: Right arm)   Pulse 93   Temp (!) 96.5  F (35.8  C) (Oral)   Resp 15   Ht 1.626 m (5' 4\")   Wt 61.2 kg (135 lb)   LMP  (LMP Unknown)   SpO2 98%   BMI 23.17 kg/m     O2: O2> 95% ORA, denies feeling SOB, lightheadedness   Output: Pt incontinent, purewick in place    Last BM: 5/27, incontinent   1x loose stool today, previously had a loose stool yesmacario - MD notified. Author advised to continue to monitor and report if loose stools persist   Activity: Ax1 w/ walker and gb, generalized weakness - was able to ambulate short distance to bathroom, SBA     Ambulated w/ author in hallway - walked 25 feet today   Up for meals? Sits up for meals    Skin: L hip sugical incision, R wrist bruising and scab present  Bruising around L hip   Blanchable redness to coccyx, barrier cream applied    Pain: Reports pain to her L hip, managed w/ prn tylenol and ice packs  Has oxycodone available    CMS: Oriented to self, AOx1-2 Bed/Chair Alarms on  Denies numbness and tingling    Dressing: L hip dressing CDI    Diet: Reg, denies nausea/vomiting - needs assistance w/ ordering meals   LDA: L forearm PIV SL   Plan: Pending placement, per SW note, anticipated discharge to assisted living [facility]. TBD   Continue POC    Additional Info:          Pt triggered sepsis protocol at 1519 - MD paged  , pt just triggered early sepsis protocol, no fever present, Vital signs stable, will recheck vitals q30min x2  Jean-Pierre [phone]  "

## 2023-05-27 NOTE — PLAN OF CARE
Goal Outcome Evaluation:    Took over care @ 1900. Pt alert with intermittent confusion. Knows she is in the hospital and had hip surgery. Able to state . Ambulated in the hallway w/ A1 + walker and gait belt.  Denies pain at rest, rated pain 2/10 w/ movement. Purewick in place at night. Denies other acute complaints. Pt's DURGA not able to meet pt's need so may need TCU. Follow POC.

## 2023-05-27 NOTE — PROGRESS NOTES
[facility]    Medicine Progress Note - Hospitalist Service, GOLD TEAM 16    Date of Admission:  5/20/2023    Assessment & Plan     83 year old female patient with a past medical history significant for HTN, HLD, overactive bladder, GERD, basal cell carcinoma, osteoporosis, frequent falls, recurrent UTIs, cervical spondylosis, Alzheimer's dementia who presented from memory care assisted living to [facility] ED after a mechanical fall at home with left hip pain; found to have femoral neck fracture.     Today's(5/27/2023) Progress:  Patient denies any acute issues.  Denies any acute concerns.  Awaiting placement.     Mechanical fall with resultant Mildly impacted subcapital left femoral neck fracture, POD #1  Trauma work up in ED included CT C/A/P, CT cervical spine, CT head, CT L Hip and XR pelvis with L hip. Findings notable for mildly impacted subcapital left femoral neck fracture, small-moderate deep subcutaneous hematoma posterolateral left hip compatible with soft tissue contusion.Pt scheduled for surgery  5/20/23     Hypernatremia, resolved  Sodium noted to be 155 at admission in setting of poor PO and fall. She received 1L NS in ED.   - Sodium recheck: 140   - IVF while NPO, would likely choose D5 or 1/2 NS     JAYLEN, improving  Creatinine at admission noted to be 1.02. Patient received 1L NS in ED.  Creatinine 1.2 on 5/27/2023.  Encourage p.o. intake.  - BMP in a.m.   - Hold ARB     Alzheimer's dementia   - Delirium precautions   - PT, OT and social work consults   - No longer on Aricept     HTN  Hold losartan due to mild elevation of creatinine.  Blood pressures are really well controlled.  Reassess BMP in AM.     HLD   - Continue PTA statin     Lung nodules  Few sub 6mm pulmonary nodules noted incidentally on CT C/A/P at admission.   - Will need repeat Chest CT in 12 months     Fluid attenuation anterior mediastinum  Noted incidentally on CT C/A/P at  admission.   - Consider MR Chest for better characterization once able to reliably remain still and once femoral fracture repaired.             Diet: Regular Diet Adult  Diet    DVT Prophylaxis: Aspirin per surgery  Dooley Catheter: Not present  Lines: None     Cardiac Monitoring: None  Code Status: No CPR- Do NOT Intubate      Clinically Significant Risk Factors              # Hypoalbuminemia: Lowest albumin = 3.2 g/dL at 5/21/2023  6:42 AM, will monitor as appropriate                     Disposition Plan     Expected Discharge Date: 05/28/2023    Discharge Delays: Other (Add Comment)  Destination: assisted living  Discharge Comments: From [facility].  They want a care conference before they'll accept her back.          Chencho Valverde MD  Hospitalist Service, GOLD TEAM [address] [place] [address]  Securely message with [employer] (more info)  Text page via [employer] Paging/Directory   See signed in provider for up to date coverage information  ______________________________________________________________________    Interval History   Handoff taken from Dr. Lopez  Patient denies any acute issues.  Reports doing well    Physical Exam   Vital Signs: Temp: (!) 96.2  F (35.7  C) Temp src: Oral BP: 131/66 Pulse: 90   Resp: 15 SpO2: 98 % O2 Device: None (Room air)    Weight: 135 lbs 0 oz    General Appearance: Laying in bed in no acute distress  Respiratory: Bilaterally clear to auscultation  Cardiovascular: 1, S2 normal  GI: Soft, nontender bowel sounds positive  Skin: No obvious rash  Left hip has some bruising and swelling.  Tender to touch    Medical Decision Making             Data

## 2023-05-28 ENCOUNTER — APPOINTMENT (OUTPATIENT)
Dept: PHYSICAL THERAPY | Facility: CLINIC | Age: 83
DRG: 481 | End: 2023-05-28
Payer: COMMERCIAL

## 2023-05-28 PROCEDURE — 97116 GAIT TRAINING THERAPY: CPT | Mod: GP

## 2023-05-28 PROCEDURE — 97530 THERAPEUTIC ACTIVITIES: CPT | Mod: GP

## 2023-05-28 PROCEDURE — 99207 PR CDG-CUT & PASTE-POTENTIAL IMPACT ON LEVEL: CPT | Performed by: INTERNAL MEDICINE

## 2023-05-28 PROCEDURE — 99232 SBSQ HOSP IP/OBS MODERATE 35: CPT | Performed by: INTERNAL MEDICINE

## 2023-05-28 PROCEDURE — 250N000013 HC RX MED GY IP 250 OP 250 PS 637: Performed by: STUDENT IN AN ORGANIZED HEALTH CARE EDUCATION/TRAINING PROGRAM

## 2023-05-28 PROCEDURE — 120N000002 HC R&B MED SURG/OB UMMC

## 2023-05-28 RX ADMIN — ACETAMINOPHEN 650 MG: 325 TABLET, FILM COATED ORAL at 07:50

## 2023-05-28 RX ADMIN — ASPIRIN 81 MG CHEWABLE TABLET 162 MG: 81 TABLET CHEWABLE at 07:50

## 2023-05-28 RX ADMIN — SIMVASTATIN 40 MG: 40 TABLET, FILM COATED ORAL at 19:54

## 2023-05-28 ASSESSMENT — ACTIVITIES OF DAILY LIVING (ADL)
ADLS_ACUITY_SCORE: 38

## 2023-05-28 NOTE — PLAN OF CARE
"VS: /68 (BP Location: Right arm)   Pulse 105   Temp (!) 96.5  F (35.8  C) (Oral)   Resp 13   Ht 1.626 m (5' 4\")   Wt 61.2 kg (135 lb)   LMP  (LMP Unknown)   SpO2 96%   BMI 23.17 kg/m     O2: O2> 95% ORA, denies feeling SOB, lightheadedness   Output: Pt incontinent, purewick in place    Last BM: 5/28, incontinent    Activity: Ax1 w/ walker and gb, generalized weakness -   Ambulated w/ author in hallway - walked 70 feet today   Up for meals? Sits up for meals    Skin: L hip sugical incision, R wrist bruising and scab present  Bruising around L hip   Blanchable redness to coccyx, barrier cream applied    Pain: Reports pain to her L hip, managed w/ prn tylenol and ice packs  Has oxycodone available    CMS: Oriented to self, AOx1-2 Bed/Chair Alarms on  Denies numbness and tingling    Dressing: L hip dressing CDI    Diet: Reg, denies nausea/vomiting - needs assistance w/ ordering meals   LDA: L forearm PIV SL   Plan: Pending placement, per  note, anticipated discharge to assisted living [facility]. TBD   Continue POC    Additional Info:            "

## 2023-05-28 NOTE — PLAN OF CARE
Pt slept throughout the night. Pt is disorient to situation, time, and place. Bed alarm. VSS. LS clear, on RA. BS active, LBM 5/27/2023. Pt is incontinent of bladder and bowel. Purewick in place. L hip surgical dressing is c/d/I. L PIV is patent and SL. Pt up with 2 assist with walker. Continue to monitor.

## 2023-05-28 NOTE — PROGRESS NOTES
[facility]    Medicine Progress Note - Hospitalist Service, GOLD TEAM 16    Date of Admission:  5/20/2023    Assessment & Plan     83 year old female patient with a past medical history significant for HTN, HLD, overactive bladder, GERD, basal cell carcinoma, osteoporosis, frequent falls, recurrent UTIs, cervical spondylosis, Alzheimer's dementia who presented from memory care assisted living to [facility] ED after a mechanical fall at home with left hip pain; found to have femoral neck fracture.     Today's(5/2 8/2023) Progress:  Patient denies any acute concerns this morning.  Denies any pain or discomfort.  Overall doing well      Mechanical fall with resultant Mildly impacted subcapital left femoral neck fracture, POD #1  Trauma work up in ED included CT C/A/P, CT cervical spine, CT head, CT L Hip and XR pelvis with L hip. Findings notable for mildly impacted subcapital left femoral neck fracture, small-moderate deep subcutaneous hematoma posterolateral left hip compatible with soft tissue contusion.Pt scheduled for surgery  5/20/23     Hypernatremia, resolved  Sodium noted to be 155 at admission in setting of poor PO and fall. She received 1L NS in ED.   - Sodium recheck: 140   - IVF while NPO, would likely choose D5 or 1/2 NS     JAYLEN, improving  Creatinine at admission noted to be 1.02. Patient received 1L NS in ED.  Creatinine 1.2 on 5/27/2023.  Encourage p.o. intake.  - BMP in a.m.   - Hold ARB     Alzheimer's dementia   - Delirium precautions   - PT, OT and social work consults   - No longer on Aricept     HTN  Hold losartan due to mild elevation of creatinine.  Blood pressures are really well controlled.  Reassess BMP in AM.     HLD   - Continue PTA statin     Lung nodules  Few sub 6mm pulmonary nodules noted incidentally on CT C/A/P at admission.   - Will need repeat Chest CT in 12 months     Fluid attenuation anterior mediastinum  Noted incidentally on CT  C/A/P at admission.   - Consider MR Chest for better characterization once able to reliably remain still and once femoral fracture repaired.             Diet: Regular Diet Adult  Diet    DVT Prophylaxis: Aspirin per surgery  Dooley Catheter: Not present  Lines: None     Cardiac Monitoring: None  Code Status: No CPR- Do NOT Intubate      Clinically Significant Risk Factors              # Hypoalbuminemia: Lowest albumin = 3.2 g/dL at 5/21/2023  6:42 AM, will monitor as appropriate                     Disposition Plan     Expected Discharge Date: 05/28/2023    Discharge Delays: Other (Add Comment)  Destination: assisted living  Discharge Comments: From [facility].  They want a care conference before they'll accept her back.          Chencho Valverde MD  Hospitalist Service, [address]  Securely message with [employer] (more info)  Text page via [employer] Paging/Directory   See signed in provider for up to date coverage information  ______________________________________________________________________    Interval History   Handoff taken from Dr. Lopez  Patient denies any acute issues.  Reports doing well    Physical Exam   Vital Signs: Temp: (!) 96.3  F (35.7  C) Temp src: Oral BP: (!) 149/75 Pulse: 90   Resp: 15 SpO2: 97 % O2 Device: None (Room air)    Weight: 135 lbs 0 oz    General Appearance: Laying in bed in no acute distress  Respiratory: Bilaterally clear to auscultation  Cardiovascular: 1, S2 normal  GI: Soft, nontender bowel sounds positive  Skin: No obvious rash  Left hip has some bruising and swelling.  Tender to touch    Medical Decision Making             Data

## 2023-05-29 LAB
ANION GAP SERPL CALCULATED.3IONS-SCNC: 10 MMOL/L (ref 7–15)
BUN SERPL-MCNC: 38.1 MG/DL (ref 8–23)
CALCIUM SERPL-MCNC: 9.7 MG/DL (ref 8.8–10.2)
CHLORIDE SERPL-SCNC: 101 MMOL/L (ref 98–107)
CREAT SERPL-MCNC: 1 MG/DL (ref 0.51–0.95)
DEPRECATED HCO3 PLAS-SCNC: 23 MMOL/L (ref 22–29)
GFR SERPL CREATININE-BSD FRML MDRD: 56 ML/MIN/1.73M2
GLUCOSE SERPL-MCNC: 97 MG/DL (ref 70–99)
POTASSIUM SERPL-SCNC: 4.6 MMOL/L (ref 3.4–5.3)
SODIUM SERPL-SCNC: 134 MMOL/L (ref 136–145)

## 2023-05-29 PROCEDURE — 36415 COLL VENOUS BLD VENIPUNCTURE: CPT | Performed by: INTERNAL MEDICINE

## 2023-05-29 PROCEDURE — 99232 SBSQ HOSP IP/OBS MODERATE 35: CPT | Performed by: INTERNAL MEDICINE

## 2023-05-29 PROCEDURE — 120N000002 HC R&B MED SURG/OB UMMC

## 2023-05-29 PROCEDURE — 250N000013 HC RX MED GY IP 250 OP 250 PS 637: Performed by: STUDENT IN AN ORGANIZED HEALTH CARE EDUCATION/TRAINING PROGRAM

## 2023-05-29 PROCEDURE — 80048 BASIC METABOLIC PNL TOTAL CA: CPT | Performed by: INTERNAL MEDICINE

## 2023-05-29 RX ADMIN — ACETAMINOPHEN 650 MG: 325 TABLET, FILM COATED ORAL at 08:44

## 2023-05-29 RX ADMIN — SIMVASTATIN 40 MG: 40 TABLET, FILM COATED ORAL at 19:34

## 2023-05-29 RX ADMIN — ACETAMINOPHEN 650 MG: 325 TABLET, FILM COATED ORAL at 15:08

## 2023-05-29 RX ADMIN — ACETAMINOPHEN 650 MG: 325 TABLET, FILM COATED ORAL at 22:41

## 2023-05-29 RX ADMIN — ASPIRIN 81 MG CHEWABLE TABLET 162 MG: 81 TABLET CHEWABLE at 08:42

## 2023-05-29 RX ADMIN — Medication 1 MG: at 22:41

## 2023-05-29 ASSESSMENT — ACTIVITIES OF DAILY LIVING (ADL)
ADLS_ACUITY_SCORE: 38
ADLS_ACUITY_SCORE: 37
ADLS_ACUITY_SCORE: 38

## 2023-05-29 NOTE — PROGRESS NOTES
"  VS: BP (!) 153/82 (BP Location: Left arm)   Pulse 92   Temp 97.1  F (36.2  C) (Oral)   Resp 16   Ht 1.626 m (5' 4\")   Wt 61.2 kg (135 lb)   LMP  (LMP Unknown)   SpO2 100%   BMI 23.17 kg/m     O2: Room air saturations 100%.    Output: Patient is incontinent of bowel and bladder   Last BM: Had large loose BM this am.    Activity: Pt was encouraged to be up in chair for meals.    Skin: Did complete bed bath this am. Applied a new meplix to cocxyx   Pain: Pt took Tylenol for pain. Pt states\" I really like to only use Tylenol. It works well for me. \" Intact   CMS:    Dressing: Left hip dressing CDI   Diet: Regular. Ate 100% this am.    LDA:    Equipment: Bed alarm, chair alarm, briefs, purewick   Plan: Awaiting for bed placement   Additional Info: Pt likes to talk about when she was an , and talk about her daughter. She is up in chair trying to read a book her daughter brought her. Pt resting comfortably between cares.        "

## 2023-05-29 NOTE — PROGRESS NOTES
[facility]    Medicine Progress Note - Hospitalist Service, GOLD TEAM 16    Date of Admission:  5/20/2023    Assessment & Plan     83 year old female patient with a past medical history significant for HTN, HLD, overactive bladder, GERD, basal cell carcinoma, osteoporosis, frequent falls, recurrent UTIs, cervical spondylosis, Alzheimer's dementia who presented from memory care assisted living to [facility] ED after a mechanical fall at home with left hip pain; found to have femoral neck fracture.     Today's(5/29 /2023) Progress:  Patient reports doing well.  Patient is sitting on chair.  Patient denies any pain or discomfort.  Patient denies any chest pain or shortness of breath       Mechanical fall with resultant Mildly impacted subcapital left femoral neck fracture, POD #1  Trauma work up in ED included CT C/A/P, CT cervical spine, CT head, CT L Hip and XR pelvis with L hip. Findings notable for mildly impacted subcapital left femoral neck fracture, small-moderate deep subcutaneous hematoma posterolateral left hip compatible with soft tissue contusion.Pt scheduled for surgery  5/20/23     Hypernatremia, resolved  Sodium noted to be 155 at admission in setting of poor PO and fall. She received 1L NS in ED.   - Sodium recheck: 140   - IVF while NPO, would likely choose D5 or 1/2 NS     JAYLEN, improving  Creatinine at admission noted to be 1.02. Patient received 1L NS in ED.  Creatinine 1.2 on 5/27/2023.  Encourage p.o. intake.  Creatinine level at 1 mg/dL on 5/2 9/2023   - Hold ARB     Alzheimer's dementia   - Delirium precautions   - PT, OT and social work consults   - No longer on Aricept     HTN  Hold losartan due to mild elevation of creatinine.  Blood pressures are really well controlled.  Reassess BMP in AM.     HLD   - Continue PTA statin     Lung nodules  Few sub 6mm pulmonary nodules noted incidentally on CT C/A/P at admission.   - Will need repeat Chest CT  in 12 months     Fluid attenuation anterior mediastinum  Noted incidentally on CT C/A/P at admission.   - Consider MR Chest for better characterization once able to reliably remain still and once femoral fracture repaired.             Diet: Regular Diet Adult  Diet    DVT Prophylaxis: Aspirin per surgery  Dooley Catheter: Not present  Lines: None     Cardiac Monitoring: None  Code Status: No CPR- Do NOT Intubate      Clinically Significant Risk Factors              # Hypoalbuminemia: Lowest albumin = 3.2 g/dL at 5/21/2023  6:42 AM, will monitor as appropriate                     Disposition Plan           Chencho Valverde MD  Hospitalist Service, GOLD TEAM [address] [place] [address]  Securely message with [employer] (more info)  Text page via [facility] Paging/Directory   See signed in provider for up to date coverage information  ______________________________________________________________________    Interval History     Overnight events reviewed.  Patient reports doing well.  Patient denies any nausea, vomiting, chest pain or shortness of breath.    Physical Exam   Vital Signs: Temp: 97.1  F (36.2  C) Temp src: Oral BP: (!) 153/82 Pulse: 92   Resp: 16 SpO2: 100 % O2 Device: None (Room air)    Weight: 135 lbs 0 oz    General Appearance: Laying in bed in no acute distress  Respiratory: Bilaterally clear to auscultation  Cardiovascular: 1, S2 normal  GI: Soft, nontender bowel sounds positive  Skin: No obvious rash  Left hip has some bruising and swelling.  Tender to touch    Medical Decision Making             Data

## 2023-05-29 NOTE — PLAN OF CARE
Pt is disorient to situation, time, and place. Bed alarm. VSS. LS clear, on RA. BS active, LBM 5/28/2023. Pt is incontinent of bladder and bowel. Purewick in place. L hip surgical dressing is c/d/I. L PIV is patent and SL. Pt up with 1 assist with walker. Continue to monitor

## 2023-05-30 ENCOUNTER — APPOINTMENT (OUTPATIENT)
Dept: PHYSICAL THERAPY | Facility: CLINIC | Age: 83
DRG: 481 | End: 2023-05-30
Payer: COMMERCIAL

## 2023-05-30 PROCEDURE — 97530 THERAPEUTIC ACTIVITIES: CPT | Mod: GP

## 2023-05-30 PROCEDURE — 99232 SBSQ HOSP IP/OBS MODERATE 35: CPT | Performed by: INTERNAL MEDICINE

## 2023-05-30 PROCEDURE — 250N000013 HC RX MED GY IP 250 OP 250 PS 637: Performed by: STUDENT IN AN ORGANIZED HEALTH CARE EDUCATION/TRAINING PROGRAM

## 2023-05-30 PROCEDURE — 97116 GAIT TRAINING THERAPY: CPT | Mod: GP

## 2023-05-30 PROCEDURE — 120N000002 HC R&B MED SURG/OB UMMC

## 2023-05-30 RX ADMIN — ACETAMINOPHEN 650 MG: 325 TABLET, FILM COATED ORAL at 05:36

## 2023-05-30 RX ADMIN — ACETAMINOPHEN 650 MG: 325 TABLET, FILM COATED ORAL at 12:38

## 2023-05-30 RX ADMIN — SIMVASTATIN 40 MG: 40 TABLET, FILM COATED ORAL at 19:42

## 2023-05-30 RX ADMIN — ASPIRIN 81 MG CHEWABLE TABLET 162 MG: 81 TABLET CHEWABLE at 08:28

## 2023-05-30 ASSESSMENT — ACTIVITIES OF DAILY LIVING (ADL)
ADLS_ACUITY_SCORE: 42
ADLS_ACUITY_SCORE: 46
ADLS_ACUITY_SCORE: 44
ADLS_ACUITY_SCORE: 46
ADLS_ACUITY_SCORE: 42
ADLS_ACUITY_SCORE: 46
ADLS_ACUITY_SCORE: 42
ADLS_ACUITY_SCORE: 46

## 2023-05-30 NOTE — PLAN OF CARE
"Goal Outcome Evaluation:  VS: /66 (BP Location: Left arm)   Pulse 100   Temp 97.6  F (36.4  C) (Oral)   Resp 16   Ht 1.626 m (5' 4\")   Wt 61.2 kg (135 lb)   LMP  (LMP Unknown)   SpO2 96%   BMI 23.17 kg/m       O2: SpO2 > 92% and stable on RA. LS clear and equal bilaterally. Denies chest pain and SOB.    Output: Pt incontinent of bladder and has brief on. Pt needs brief change Q2-3 hrs.    Last BM: Incontinence bowel, LBM 5/29.    Activity: Up with A X2. uses walker and gait belt. Pt was up on a chair for dinner.    Skin: WDL except, incision on left hip, blanchable redness on coccyx and bruises on left hip.    Pain: Pain managed with prn tylenol.   CMS: Pt A&O X2. Forgetful. CMS intact.    Dressing: Left hip incisional dressing CDI. Sacral Mapilex applied to redness on coccyx.    Diet: Regular diet.    LDA: PIV SL into left forearm.    Equipment: personal belongings,    Plan: Waiting for placement. Continue with plan of care. Call light within reach, pt able to make needs known.    Additional Info:                              "

## 2023-05-30 NOTE — PLAN OF CARE
VS: Vitals stable overnight. Pt denies chest pain.    O2: >90% on RA, lung sounds clear and equal bilaterally.    Output: Incontinent x1 overnight   Last BM: Incontinent x1 overnight   Activity: AX1-2 with walker and gait belt when pt is OOB.    Up for meals? N/A   Skin: L hip surgical incision. Bruise to L hip and R forearm.    Pain: Managed with PRN Tylenol   CMS: Intact. Pt denies numbness or tingling. Disoriented to time and situation.    Dressing: L hip - CDI   Diet: Regular - needs help ordering food.    LDA: PIV L forearm - SL   Equipment: Walker, gait belt, and pt belongings.    Plan: Waiting placement   Additional Info:    '

## 2023-05-30 NOTE — PROGRESS NOTES
"CLINICAL NUTRITION SERVICES - ASSESSMENT NOTE     Nutrition Prescription    RECOMMENDATIONS FOR MDs/PROVIDERS TO ORDER:  Continue to encourage oral intakes    Malnutrition Status:    At least moderate malnutrition in the context of chronic illness    Recommendations already ordered by Registered Dietitian (RD):  None today    Future/Additional Recommendations:  Monitor labs, intakes, and weight trends.     REASON FOR ASSESSMENT  Kim Ybarra is a/an 83 year old female assessed by the dietitian for LOS    NUTRITION/MEDICAL HISTORY  History of HTN, HLD, overactive bladder, GERD, basal cell carcinoma, osteoporosis, frequent falls, recurrent UTIs, cervical spondylosis, Alzheimer's dementia who presented from memory care assisted living to [facility] ED after a mechanical fall at home with left hip pain; found to have femoral neck fracture.    FINDINGS  RD visited pt in room. Pt sleeping and falling asleep during conversation.  Per RN with great appetite however very sleepy today.     CURRENT NUTRITION ORDERS  Diet: Regular    Intake/Tolerance: % of documented meals, one 25% over last week.     Pt ordering (on average) 1560 kcal and 67 g protein per day per [employer]. With documented intakes, pt is likely meeting 75% minimum energy and >75% protein needs.    LABS   05/27/23 07:24 05/29/23 05:01   Sodium 135 (L) 134 (L)   Potassium 4.7 4.6   Urea Nitrogen 40.2 (H) 38.1 (H)   Creatinine 1.20 (H) 1.00 (H)   Glucose 98 97     MEDICATIONS  Medications reviewed    ANTHROPOMETRICS  Height: 162.6 cm (5' 4\")  Most Recent Weight: 61.2 kg (135 lb)   IBW: 54.5 kg (112% IBW)  BMI: Normal BMI  Weight History: Pt with limited weight history prior to admission, with 13 lb (9%) weight loss over 2 months if current weight accurate however at similar weight to 1 year prior     Wt Readings from Last Encounters:   05/20/23 61.2 kg (135 lb)   03/12/23 67.1 kg (147 lb 14.9 oz)   12/29/22 06/08/22 64.3 kg (141 lb 12.1 oz) - Care " everywhere   59 kg (130 lb) - Care everywhere        Dosing Weight: 61 kg - most recent weight    ASSESSED NUTRITION NEEDS  Estimated Energy Needs: 4832-1922 kcals/day (25 - 30 kcals/kg)  Justification: Obese  Estimated Protein Needs: 61-73 grams protein/day (1 - 1.2 grams of pro/kg)  Justification: Increased needs  Estimated Fluid Needs: 1 ml/kcal or per provider pending fluid status    PHYSICAL FINDINGS  See malnutrition section below.  Overall frail/ill appearing    MALNUTRITION  % Intake: Decreased intake does not meet criteria  % Weight Loss: Unable to assess  Subcutaneous Fat Loss: Facial region:  Mild-moderate on physical exam  Muscle Loss: Temporal: Mild-moderate, Facial & jaw region:  Moderate and Thoracic region (clavicle, acromium bone, deltoid, trapezius, pectoral):  Moderate-severe  Fluid Accumulation/Edema: None noted  Malnutrition Diagnosis: At least moderate malnutrition in the context of chronic illness.    NUTRITION DIAGNOSIS  Predicted inadequate nutrient intake related to varied appetite and LOS as evidenced by potential for menu fatigue with prolonged hospitalization and pt reliant on PO intakes to meet 100% needs with potential for variation.     INTERVENTIONS  Implementation  None today    Goals  Patient to consume % of nutritionally adequate meal trays TID, or the equivalent with supplements/snacks.     Monitoring/Evaluation  Progress toward goals will be monitored and evaluated per protocol.    Sveta Robert MS, RDN, LDN  RD pager: [phone]  [facility] Weekend/Holiday Pager: [phone]

## 2023-05-30 NOTE — PROGRESS NOTES
"  VS: /68 (BP Location: Left arm)   Pulse 100   Temp 98  F (36.7  C) (Oral)   Resp 16   Ht 1.626 m (5' 4\")   Wt 61.2 kg (135 lb)   LMP  (LMP Unknown)   SpO2 96%   BMI 23.17 kg/m     O2: Denied SOB on RA   Output: Incontinent B/B, assist of 2 for changing   Last BM: 5/30/23   Activity: A2 w/walker and gait belt,usually up in chair for meals   Skin: Incision on L hip, blanchable redness on coccyx, bruises on L hip   Pain: Managed with tylenol   CMS: Disoriented to situation and time   Dressing: Surgical dressing L hip CDI, L hip bruises   Diet: Regular/thin   LDA: PIV L forearm   Equipment: IV pole, personal belongings   Plan: Waiting for placement   Additional Info:           "

## 2023-05-31 ENCOUNTER — APPOINTMENT (OUTPATIENT)
Dept: PHYSICAL THERAPY | Facility: CLINIC | Age: 83
DRG: 481 | End: 2023-05-31
Payer: COMMERCIAL

## 2023-05-31 PROCEDURE — 250N000013 HC RX MED GY IP 250 OP 250 PS 637: Performed by: STUDENT IN AN ORGANIZED HEALTH CARE EDUCATION/TRAINING PROGRAM

## 2023-05-31 PROCEDURE — 120N000002 HC R&B MED SURG/OB UMMC

## 2023-05-31 PROCEDURE — 99232 SBSQ HOSP IP/OBS MODERATE 35: CPT | Performed by: INTERNAL MEDICINE

## 2023-05-31 PROCEDURE — 97110 THERAPEUTIC EXERCISES: CPT | Mod: GP | Performed by: PHYSICAL THERAPIST

## 2023-05-31 RX ADMIN — SIMVASTATIN 40 MG: 40 TABLET, FILM COATED ORAL at 20:16

## 2023-05-31 RX ADMIN — ACETAMINOPHEN 650 MG: 325 TABLET, FILM COATED ORAL at 11:34

## 2023-05-31 RX ADMIN — ASPIRIN 81 MG CHEWABLE TABLET 162 MG: 81 TABLET CHEWABLE at 08:08

## 2023-05-31 ASSESSMENT — ACTIVITIES OF DAILY LIVING (ADL)
ADLS_ACUITY_SCORE: 46
ADLS_ACUITY_SCORE: 45
ADLS_ACUITY_SCORE: 46
ADLS_ACUITY_SCORE: 45
ADLS_ACUITY_SCORE: 46

## 2023-05-31 NOTE — PROGRESS NOTES
[facility]    Medicine Progress Note - Hospitalist Service, GOLD TEAM 16    Date of Admission:  5/20/2023    Assessment & Plan   83 year old female patient with a past medical history significant for HTN, HLD, overactive bladder, GERD, basal cell carcinoma, osteoporosis, frequent falls, recurrent UTIs, cervical spondylosis, Alzheimer's dementia who presented from memory care assisted living to [facility] ED after a mechanical fall at home with left hip pain; found to have femoral neck fracture.     #Mechanical fall with resultant Mildly impacted subcapital left femoral neck fracture  Trauma work up in ED included CT C/A/P, CT cervical spine, CT head, CT L Hip and XR pelvis with L hip. Findings notable for mildly impacted subcapital left femoral neck fracture, small-moderate deep subcutaneous hematoma posterolateral left hip compatible with soft tissue contusion. Pt scheduled for surgery 5/20/23     #Hypernatremia (resolved)  Sodium noted to be 155 at admission in setting of poor PO and fall. She received 1L NS in ED.  - Sodium recheck: 140  - IVF while NPO, would likely choose D5 or 1/2 NS     #JAYLEN (improving)  Creatinine at admission noted to be 1.02. Patient received 1L NS in ED.  Creatinine 1.2 on 5/27/2023.  Encourage p.o. intake.  Creatinine level at 1 mg/dL on 5/2 9/2023   - Hold ARB     #Alzheimer's dementia   - Delirium precautions   - PT, OT and social work consults   - No longer on Aricept     #HTN  - Hold losartan due to mild elevation of creatinine.  - Blood pressures are really well controlled.  - Reassess BMP in AM.     #HLD  - Continue PTA statin     #Lung nodules  Few sub 6mm pulmonary nodules noted incidentally on CT C/A/P at admission.  - Will need repeat Chest CT in 12 months     #Fluid attenuation anterior mediastinum  - Noted incidentally on CT C/A/P at admission.  - Consider MR Chest for better characterization once able to reliably remain still  and once femoral fracture repaired.        Diet: Regular Diet Adult  Diet    DVT Prophylaxis: Aspirin 162 mg p.o. daily per orthopedic surgery.  Dooley Catheter: Not present  Lines: None     Cardiac Monitoring: None  Code Status: No CPR- Do NOT Intubate      Clinically Significant Risk Factors              # Hypoalbuminemia: Lowest albumin = 3.2 g/dL at 5/21/2023  6:42 AM, will monitor as appropriate             # Moderate Malnutrition: based on nutrition assessment         Disposition Plan      Expected Discharge Date: 06/02/2023    Discharge Delays: Other (Add Comment)  Destination: assisted living  Discharge Comments: From [facility].  They want a care conference before they'll accept her back.          Patient is pending placement.    Ruy Reynoso DO, S  Hospitalist Service, GOLD TEAM [address] [place] [address]  Securely message with [employer]oc[employer] (more info)  Text page via [facility] Paging/Directory   See signed in provider for up to date coverage information  ______________________________________________________________________    Interval History   Patient is in good spirits today.  Nursing staff is present at bedside during interview and physical exam.  Patient appears pleasantly confused.  Patient denies pain globally.  Patient is pending likely long-term care placement.    Physical Exam   Vital Signs: Temp: (!) 96.4  F (35.8  C) Temp src: Oral BP: (!) 146/73 Pulse: 95   Resp: 18 SpO2: 95 % O2 Device: None (Room air)    Weight: 135 lbs 0 oz    GENERAL: Alert; awake; no acute distress; well-nourished.  HEENT: Normocephalic; atraumatic; PERRLA; MMM.  CV: RRR; normal S1, S2; no rubs, murmurs, or gallops.  RESP: Lung fields clear to aucultation B/L; no wheezing or crepitations.  GI: Abdomen is soft, nontender, nondistended; no organomegaly; normal bowel sounds.  : Deferred genital examination.   MSK: No clubbing, cyanosis, or edema.  DERM: Skin is intact; no rash, lesions, or skin  breakdown.  NEURO: No focal deficits appreciated; strength & sensorium are grossly intact.  PSYCH: No active hallucinations; pleasantly demented.    Medical Decision Making       45 MINUTES SPENT BY ME on the date of service doing chart review, history, exam, documentation & further activities per the note.      Data         Imaging results reviewed over the past 24 hrs:   No results found for this or any previous visit (from the past 24 hour(s)).

## 2023-05-31 NOTE — PLAN OF CARE
"Goal Outcome Evaluation:          VS: /71 (BP Location: Left arm)   Pulse 94   Temp 96.9  F (36.1  C) (Oral)   Resp 20   Ht 1.626 m (5' 4\")   Wt 61.2 kg (135 lb)   LMP  (LMP Unknown)   SpO2 95%   BMI 23.17 kg/m     O2: RA   Output: Incontinent of B/B, assist of 2 for changing due to pain   Last BM: 5/30   Activity: A2 with walker and gait belt, up in chair for meals   Skin: Incision on the left hip   Pain: Managed with tylenol   CMS: Alert to self, place   Dressing: Dressing CDI (left hip)   Diet: Regular   LDA: PIV L arm   Equipment: IV pole, personal belongings,    Plan: Continue POC   Additional Info:                     "

## 2023-05-31 NOTE — PROGRESS NOTES
A/Ox's 2, forgetful at times. Bed alarm on and frequent rounding done for safety. Pt denied pain. Dressing CDI. CMS intact. Tolerated regular diet. Denied any nausea, CP, SOB, lightheadedness or dizziness. Pure wick in place overnight. Passing flatus and had a BM. Resting in bed at this time with call light in reach. Able to make needs known. Continue to monitor.

## 2023-05-31 NOTE — PLAN OF CARE
"  VS: BP (!) 146/73 (BP Location: Left arm)   Pulse 95   Temp (!) 96.4  F (35.8  C) (Oral)   Resp 18   Ht 1.626 m (5' 4\")   Wt 61.2 kg (135 lb)   LMP  (LMP Unknown)   SpO2 95%   BMI 23.17 kg/m       O2: Stable on room air   Output: Incontinent of both bowel and bladder. Brief on. Purewick at night.    Last BM: 5/31- check brief frequently. Pt will say she is clean but she is not.    Activity: Ax1-2 with bed cares and ambulation. Walker, gait belt.    Skin: L hip surgical incision. Bruising to BUE. Scab to R shin.    Pain: Denies pain and discomfort.    CMS: intact   Dressing: L hip surgical incision- CDI   Diet: Regular diet, thin liquid, takes pills whole.   - Needs help ordering meals.    LDA: L PIV saline locked   Equipment: Personal belongings   Plan: Care conference needs to be done before going back to [facility].    Additional Info: A&Ox2; disoriented to time and situation. Pt believes she is in a LTF but knows she had hip surgery. Denies headache, chest pain, SOB, numbness or tingling. Anticipate needs and round frequently.        "

## 2023-05-31 NOTE — PROGRESS NOTES
[facility]    Medicine Progress Note - Hospitalist Service, GOLD TEAM 16    Date of Admission:  5/20/2023    Assessment & Plan   83 year old female patient with a past medical history significant for HTN, HLD, overactive bladder, GERD, basal cell carcinoma, osteoporosis, frequent falls, recurrent UTIs, cervical spondylosis, Alzheimer's dementia who presented from memory care assisted living to [facility] ED after a mechanical fall at home with left hip pain; found to have femoral neck fracture.     #Mechanical fall with resultant Mildly impacted subcapital left femoral neck fracture  Trauma work up in ED included CT C/A/P, CT cervical spine, CT head, CT L Hip and XR pelvis with L hip. Findings notable for mildly impacted subcapital left femoral neck fracture, small-moderate deep subcutaneous hematoma posterolateral left hip compatible with soft tissue contusion.Pt scheduled for surgery  5/20/23     #Hypernatremia (resolved)  Sodium noted to be 155 at admission in setting of poor PO and fall. She received 1L NS in ED.  - Sodium recheck: 140  - IVF while NPO, would likely choose D5 or 1/2 NS     #JAYLEN (improving)  Creatinine at admission noted to be 1.02. Patient received 1L NS in ED.  Creatinine 1.2 on 5/27/2023.  Encourage p.o. intake.  Creatinine level at 1 mg/dL on 5/2 9/2023   - Hold ARB     #Alzheimer's dementia   - Delirium precautions   - PT, OT and social work consults   - No longer on Aricept     #HTN  - Hold losartan due to mild elevation of creatinine.  - Blood pressures are really well controlled.  - Reassess BMP in AM.     #HLD  - Continue PTA statin     #Lung nodules  Few sub 6mm pulmonary nodules noted incidentally on CT C/A/P at admission.  - Will need repeat Chest CT in 12 months     #Fluid attenuation anterior mediastinum  - Noted incidentally on CT C/A/P at admission.  - Consider MR Chest for better characterization once able to reliably remain still  and once femoral fracture repaired.        Diet: Regular Diet Adult  Diet    DVT Prophylaxis: Pneumatic Compression Devices  Dooley Catheter: Not present  Lines: None     Cardiac Monitoring: None  Code Status: No CPR- Do NOT Intubate      Clinically Significant Risk Factors              # Hypoalbuminemia: Lowest albumin = 3.2 g/dL at 5/21/2023  6:42 AM, will monitor as appropriate             # Moderate Malnutrition: based on nutrition assessment         Disposition Plan      Expected Discharge Date: 06/01/2023    Discharge Delays: Other (Add Comment)  Destination: assisted living  Discharge Comments: From [facility].  They want a care conference before they'll accept her back.          Ruy Reynoso DO, S  Hospitalist Service, GOLD TEAM 16  [facility]  Securely message with [employer] (more info)  Text page via [employer] Paging/Directory   See signed in provider for up to date coverage information  ______________________________________________________________________    Interval History   Patient sleeping comfortably upon entering room.  Patient endorses no specific symptomatology.  Per nursing staff, no acute issues or clinical concerns.  Patient is pending placement.    Physical Exam   Vital Signs: Temp: 97.1  F (36.2  C) Temp src: Oral BP: 131/73 Pulse: 99   Resp: 16 SpO2: 98 % O2 Device: None (Room air)    Weight: 135 lbs 0 oz    GENERAL: Alert and oriented x 3; no acute distress; well-nourished.  HEENT: Normocephalic; atraumatic; PERRLA; MMM.  CV: RRR; normal S1, S2; no rubs, murmurs, or gallops.  RESP: Lung fields clear to aucultation B/L; no wheezing or crepitations.  GI: Abdomen is soft, nontender, nondistended; no organomegaly; normal bowel sounds.  : Deferred genital examination.   MSK: No clubbing, cyanosis, or edema.  DERM: Skin is intact; no rash, lesions, or skin breakdown.  NEURO: No focal deficits appreciated; strength & sensorium are grossly  intact.  PSYCH: No active hallucinations; affect, insight appear within normal limits.    Medical Decision Making       45 MINUTES SPENT BY ME on the date of service doing chart review, history, exam, documentation & further activities per the note.      Data         Imaging results reviewed over the past 24 hrs:   No results found for this or any previous visit (from the past 24 hour(s)).

## 2023-06-01 ENCOUNTER — APPOINTMENT (OUTPATIENT)
Dept: PHYSICAL THERAPY | Facility: CLINIC | Age: 83
DRG: 481 | End: 2023-06-01
Payer: COMMERCIAL

## 2023-06-01 PROCEDURE — 97530 THERAPEUTIC ACTIVITIES: CPT | Mod: GP | Performed by: PHYSICAL THERAPIST

## 2023-06-01 PROCEDURE — 97116 GAIT TRAINING THERAPY: CPT | Mod: GP | Performed by: PHYSICAL THERAPIST

## 2023-06-01 PROCEDURE — 120N000002 HC R&B MED SURG/OB UMMC

## 2023-06-01 PROCEDURE — 250N000013 HC RX MED GY IP 250 OP 250 PS 637: Performed by: STUDENT IN AN ORGANIZED HEALTH CARE EDUCATION/TRAINING PROGRAM

## 2023-06-01 PROCEDURE — 99232 SBSQ HOSP IP/OBS MODERATE 35: CPT | Performed by: INTERNAL MEDICINE

## 2023-06-01 RX ADMIN — ASPIRIN 81 MG CHEWABLE TABLET 162 MG: 81 TABLET CHEWABLE at 07:31

## 2023-06-01 RX ADMIN — SIMVASTATIN 40 MG: 40 TABLET, FILM COATED ORAL at 21:14

## 2023-06-01 RX ADMIN — ACETAMINOPHEN 650 MG: 325 TABLET, FILM COATED ORAL at 07:31

## 2023-06-01 ASSESSMENT — ACTIVITIES OF DAILY LIVING (ADL)
ADLS_ACUITY_SCORE: 40
ADLS_ACUITY_SCORE: 45
ADLS_ACUITY_SCORE: 40
ADLS_ACUITY_SCORE: 45
ADLS_ACUITY_SCORE: 40
ADLS_ACUITY_SCORE: 40
ADLS_ACUITY_SCORE: 45
ADLS_ACUITY_SCORE: 40
ADLS_ACUITY_SCORE: 40
ADLS_ACUITY_SCORE: 45

## 2023-06-01 NOTE — PLAN OF CARE
"VS: /72 (BP Location: Left arm)   Pulse 97   Temp (!) 96.5  F (35.8  C) (Oral)   Resp 13   Ht 1.626 m (5' 4\")   Wt 61.2 kg (135 lb)   LMP  (LMP Unknown)   SpO2 95%   BMI 23.17 kg/m     O2: O2> 95% ORA, denies feeling SOB, lightheadedness   Output: Pt incontinent, purewick in place    Last BM: 6/1, incontinent    Activity: Ax1 w/ walker and gb, generalized weakness -   Ambulated w/ author in hallway - walked 50 feet today   Up for meals? Sits up for meals    Skin: L hip sugical incision, R wrist bruising and scab present  Bruising around L hip   Blanchable redness to coccyx, barrier cream applied    Pain: Reports pain to her L hip, managed w/ prn tylenol and ice packs  Has oxycodone available    CMS: Oriented to self, AOx1-2 Bed/Chair Alarms on  Denies numbness and tingling    Dressing: L hip dressing CDI    Diet: Reg, denies nausea/vomiting - needs assistance w/ ordering meals   LDA: L forearm PIV SL   Plan: Pending placement, per  note, anticipated discharge to assisted living [facility]. TBD   Continue POC    Additional Info:         "

## 2023-06-01 NOTE — PROGRESS NOTES
"VSS  /72 (BP Location: Left arm)   Pulse 95   Temp 97.6  F (36.4  C) (Oral)   Resp 16   Ht 1.626 m (5' 4\")   Wt 61.2 kg (135 lb)   LMP  (LMP Unknown)   SpO2 95%   BMI 23.17 kg/m          O2:  Stable on RA   Output: Voiding adequate amounts,  incontinent. uses Purewick   Last BM: 5/31/23, fecal incontinence   Activity: Up with 1-2 assist, GB and walker. Bed and chair alarm on. Not OOB this shift     Skin: L hip surgical incision clean, dry and intact, bruising note on the L hip and forearms   Pain: No pain reported   CMS: Intact    Dressing: CDI    Diet: Regular   LDA: PIV SL   Plan: TBD, likely back to prior living facilities   Additional Info:         "

## 2023-06-01 NOTE — PROGRESS NOTES
[facility]    Medicine Progress Note - Hospitalist Service, GOLD TEAM 16    Date of Admission:  5/20/2023    Assessment & Plan   83 year old female patient with a past medical history significant for HTN, HLD, overactive bladder, GERD, basal cell carcinoma, osteoporosis, frequent falls, recurrent UTIs, cervical spondylosis, Alzheimer's dementia who presented from memory care assisted living to [facility] ED after a mechanical fall at home with left hip pain; found to have femoral neck fracture.     #Mechanical fall with resultant Mildly impacted subcapital left femoral neck fracture  Trauma work up in ED included CT C/A/P, CT cervical spine, CT head, CT L Hip and XR pelvis with L hip. Findings notable for mildly impacted subcapital left femoral neck fracture, small-moderate deep subcutaneous hematoma posterolateral left hip compatible with soft tissue contusion. Pt scheduled for surgery 5/20/23     #Hypernatremia (resolved)  Sodium noted to be 155 at admission in setting of poor PO and fall. She received 1L NS in ED.  - Sodium recheck: 140  - IVF while NPO, would likely choose D5 or 1/2 NS     #JAYLEN (improving)  Creatinine at admission noted to be 1.02. Patient received 1L NS in ED.  Creatinine 1.2 on 5/27/2023.  Encourage p.o. intake.  Creatinine level at 1 mg/dL on 5/2 9/2023   - Hold ARB     #Alzheimer's dementia   - Delirium precautions   - PT, OT and social work consults   - No longer on Aricept     #HTN  - Hold losartan due to mild elevation of creatinine.  - Blood pressures are really well controlled.  - Reassess BMP in AM.     #HLD  - Continue PTA statin     #Lung nodules  Few sub 6mm pulmonary nodules noted incidentally on CT C/A/P at admission.  - Will need repeat Chest CT in 12 months     #Fluid attenuation anterior mediastinum  - Noted incidentally on CT C/A/P at admission.  - Consider MR Chest for better characterization once able to reliably remain still  and once femoral fracture repaired.        Diet: Regular Diet Adult  Diet    DVT Prophylaxis: Aspirin 162 mg p.o. daily  Dooley Catheter: Not present  Lines: None     Cardiac Monitoring: None  Code Status: No CPR- Do NOT Intubate      Clinically Significant Risk Factors              # Hypoalbuminemia: Lowest albumin = 3.2 g/dL at 5/21/2023  6:42 AM, will monitor as appropriate             # Moderate Malnutrition: based on nutrition assessment         Disposition Plan      Expected Discharge Date: 06/02/2023    Discharge Delays: Other (Add Comment)  Destination: assisted living  Discharge Comments: From [facility].  They want a care conference before they'll accept her back.          Ruy Reynoso DO, S  Hospitalist Service, GOLD TEAM 16  [facility]  Securely message with [employer] (more info)  Text page via [facility] Paging/Directory   See signed in provider for up to date coverage information  ______________________________________________________________________    Interval History   Patient is seated comfortably upon entering room.  Patient endorses no specific symptomatology.  Patient is requesting to lower the blinds over her window.  Patient is requesting nursing assistance to transfer back to bed.  Patient is pending placement.    Physical Exam   Vital Signs: Temp: (!) 96.5  F (35.8  C) Temp src: Oral BP: 134/72 Pulse: 97   Resp: 13 SpO2: 95 % O2 Device: None (Room air)    Weight: 135 lbs 0 oz    GENERAL: Alert and oriented x 3; no acute distress; well-nourished.  HEENT: Normocephalic; atraumatic; PERRLA; MMM.  CV: RRR; normal S1, S2; no rubs, murmurs, or gallops.  RESP: Lung fields clear to aucultation B/L; no wheezing or crepitations.  GI: Abdomen is soft, nontender, nondistended; no organomegaly; normal bowel sounds.  : Deferred genital examination.   MSK: No clubbing, cyanosis, or edema.  DERM: Skin is intact; no rash, lesions, or skin breakdown.  NEURO: No focal  deficits appreciated; strength & sensorium are grossly intact.  PSYCH: No active hallucinations; affect, insight appear within normal limits.    Medical Decision Making       45 MINUTES SPENT BY ME on the date of service doing chart review, history, exam, documentation & further activities per the note.      Data         Imaging results reviewed over the past 24 hrs:   No results found for this or any previous visit (from the past 24 hour(s)).

## 2023-06-02 ENCOUNTER — APPOINTMENT (OUTPATIENT)
Dept: PHYSICAL THERAPY | Facility: CLINIC | Age: 83
DRG: 481 | End: 2023-06-02
Payer: COMMERCIAL

## 2023-06-02 PROCEDURE — 97110 THERAPEUTIC EXERCISES: CPT | Mod: GP

## 2023-06-02 PROCEDURE — 250N000013 HC RX MED GY IP 250 OP 250 PS 637: Performed by: STUDENT IN AN ORGANIZED HEALTH CARE EDUCATION/TRAINING PROGRAM

## 2023-06-02 PROCEDURE — 99232 SBSQ HOSP IP/OBS MODERATE 35: CPT | Performed by: INTERNAL MEDICINE

## 2023-06-02 PROCEDURE — 97530 THERAPEUTIC ACTIVITIES: CPT | Mod: GP

## 2023-06-02 PROCEDURE — 120N000002 HC R&B MED SURG/OB UMMC

## 2023-06-02 RX ORDER — LOPERAMIDE HCL 2 MG
2 CAPSULE ORAL 4 TIMES DAILY PRN
Status: DISCONTINUED | OUTPATIENT
Start: 2023-06-02 | End: 2023-06-10

## 2023-06-02 RX ADMIN — SIMVASTATIN 40 MG: 40 TABLET, FILM COATED ORAL at 19:07

## 2023-06-02 RX ADMIN — ACETAMINOPHEN 650 MG: 325 TABLET, FILM COATED ORAL at 01:19

## 2023-06-02 RX ADMIN — ASPIRIN 81 MG CHEWABLE TABLET 162 MG: 81 TABLET CHEWABLE at 09:28

## 2023-06-02 RX ADMIN — ACETAMINOPHEN 650 MG: 325 TABLET, FILM COATED ORAL at 19:07

## 2023-06-02 ASSESSMENT — ACTIVITIES OF DAILY LIVING (ADL)
ADLS_ACUITY_SCORE: 40

## 2023-06-02 NOTE — PLAN OF CARE
VS: Vital signs:  Temp: 97.1  F (36.2  C) Temp src: Oral BP: 127/67 Pulse: 99   Resp: 16 SpO2: 96 % O2 Device: None (Room air)       O2: 96 RA. Denies SOB/CP   Output: Voids via purewick.    Last BM: 6/2/2023. 3-4 loose bm.    Activity: A-1 with walker and gaitbelt    Up for meals? Yes    Skin: Bruice on L hip and R forarm   Pain: Denied pain    Neuro / CMS: A&Ox3. Disoriented to time. CMS intact    Dressing: R hip dressing CDI.    Diet: Regular    LDA: L PIV saline locked   Plan: Continue with plan of care    Additional Info:

## 2023-06-02 NOTE — PROGRESS NOTES
[facility]    Medicine Progress Note - Hospitalist Service, GOLD TEAM 16    Date of Admission:  5/20/2023    Assessment & Plan   83 year old female patient with a past medical history significant for HTN, HLD, overactive bladder, GERD, basal cell carcinoma, osteoporosis, frequent falls, recurrent UTIs, cervical spondylosis, Alzheimer's dementia who presented from memory care assisted living to [facility] ED after a mechanical fall at home with left hip pain; found to have femoral neck fracture.     #Mechanical fall with resultant Mildly impacted subcapital left femoral neck fracture  Trauma work up in ED included CT C/A/P, CT cervical spine, CT head, CT L Hip and XR pelvis with L hip. Findings notable for mildly impacted subcapital left femoral neck fracture, small-moderate deep subcutaneous hematoma posterolateral left hip compatible with soft tissue contusion. Pt scheduled for surgery 5/20/23     #Hypernatremia (resolved)  Sodium noted to be 155 at admission in setting of poor PO and fall. She received 1L NS in ED.  - Sodium recheck: 140  - IVF while NPO, would likely choose D5 or 1/2 NS     #JAYLEN (improving)  Creatinine at admission noted to be 1.02. Patient received 1L NS in ED.  Creatinine 1.2 on 5/27/2023.  Encourage p.o. intake.  Creatinine level at 1 mg/dL on 5/2 9/2023   - Hold ARB     #Alzheimer's dementia   - Delirium precautions   - PT, OT and social work consults   - No longer on Aricept     #HTN  - Hold losartan due to mild elevation of creatinine.  - Blood pressures are really well controlled.  - Reassess BMP in AM.     #HLD  - Continue PTA statin     #Lung nodules  Few sub 6mm pulmonary nodules noted incidentally on CT C/A/P at admission.  - Will need repeat Chest CT in 12 months     #Fluid attenuation anterior mediastinum  - Noted incidentally on CT C/A/P at admission.  - Consider MR Chest for better characterization once able to reliably remain still  and once femoral fracture repaired.        Diet: Regular Diet Adult  Diet    DVT Prophylaxis: Pneumatic Compression Devices  Dooley Catheter: Not present  Lines: None     Cardiac Monitoring: None  Code Status: No CPR- Do NOT Intubate      Clinically Significant Risk Factors              # Hypoalbuminemia: Lowest albumin = 3.2 g/dL at 5/21/2023  6:42 AM, will monitor as appropriate             # Moderate Malnutrition: based on nutrition assessment         Disposition Plan      Expected Discharge Date: 06/03/2023    Discharge Delays: Other (Add Comment)  Destination: assisted living  Discharge Comments: From [facility].  They want a care conference before they'll accept her back.          Ruy Reynoso DO, S  Hospitalist Service, GOLD TEAM 16  [facility]  Securely message with [employer] (more info)  Text page via [employer] Paging/Directory   See signed in provider for up to date coverage information  ______________________________________________________________________    Interval History   Patient seated comfortably upon entering room.  Patient endorses no specific symptomatology.  Per nursing staff, no acute issues or clinical concerns.  Patient is pending placement    Physical Exam   Vital Signs: Temp: 97.1  F (36.2  C) Temp src: Oral BP: 127/67 Pulse: 99   Resp: 16 SpO2: 96 % O2 Device: None (Room air)    Weight: 135 lbs 0 oz    GENERAL: Awake; alert; no acute distress; well-nourished.  HEENT: Normocephalic; atraumatic; PERRLA; MMM.  CV: RRR; normal S1, S2; no rubs, murmurs, or gallops.  RESP: Lung fields clear to aucultation B/L; no wheezing or crepitations.  GI: Abdomen is soft, nontender, nondistended; no organomegaly; normal bowel sounds.  : Deferred genital examination.   MSK: No clubbing, cyanosis, or edema.  DERM: Skin is intact; no rash, lesions, or skin breakdown.  NEURO: No focal deficits appreciated; strength & sensorium are grossly intact.  PSYCH: No active  hallucinations; pleasantly confused.    Medical Decision Making       45 MINUTES SPENT BY ME on the date of service doing chart review, history, exam, documentation & further activities per the note.      Data         Imaging results reviewed over the past 24 hrs:   No results found for this or any previous visit (from the past 24 hour(s)).

## 2023-06-02 NOTE — PLAN OF CARE
Goal Outcome Evaluation:      Plan of Care Reviewed With: patient    VS: VSS   O2: SpO2 > 90% and stable on RA. LS clear and equal bilaterally. Denies chest pain and SOB.    Output: Voids spontaneously without difficulty to bathroom / using beside urinal / BSC.   Last BM: Pt incontinent, purewick in place    Activity: Up with A 1. Did not get oob for the shift   Skin: L hip sugical incision, R wrist bruising present  Bruising around L hip    Pain: Pain managed with tylenol. Has oxy available as well    CMS:  AOx1-2 Bed/Chair Alarms on  Denies numbness and tingling    Dressing: L hip CDI   Diet: Regular diet. Denies nausea/vomiting.   Needs assistance with ordering food   LDA: L PIV SL    Equipment: IV pole, personal belongings,    Plan: Pending placement, per SW note, anticipated discharge to assisted living [facility]. TBD   Continue POC    Additional Info:

## 2023-06-02 NOTE — CARE PLAN
Shift: 1900 - 2300    Pt is alert, and orient to self  and  disoriented to time place and situation. Pt is pleasant , calm and cooperative this shift. Denies SOB, chest pain, N/T, N/V headache/dizziness. Patient took bed time medication, but was confused if she should swallow or chew the pill. Reorient.  No acute changes. Call light is in reach, needs assistant with ordering meals. Continue with POC

## 2023-06-03 PROCEDURE — 120N000002 HC R&B MED SURG/OB UMMC

## 2023-06-03 PROCEDURE — 250N000013 HC RX MED GY IP 250 OP 250 PS 637: Performed by: STUDENT IN AN ORGANIZED HEALTH CARE EDUCATION/TRAINING PROGRAM

## 2023-06-03 PROCEDURE — 99232 SBSQ HOSP IP/OBS MODERATE 35: CPT | Performed by: INTERNAL MEDICINE

## 2023-06-03 RX ADMIN — ACETAMINOPHEN 650 MG: 325 TABLET, FILM COATED ORAL at 20:48

## 2023-06-03 RX ADMIN — Medication 2.5 MG: at 23:49

## 2023-06-03 RX ADMIN — SIMVASTATIN 40 MG: 40 TABLET, FILM COATED ORAL at 20:49

## 2023-06-03 RX ADMIN — ASPIRIN 81 MG CHEWABLE TABLET 162 MG: 81 TABLET CHEWABLE at 08:30

## 2023-06-03 RX ADMIN — ACETAMINOPHEN 650 MG: 325 TABLET, FILM COATED ORAL at 10:43

## 2023-06-03 RX ADMIN — Medication 1 MG: at 23:49

## 2023-06-03 ASSESSMENT — ACTIVITIES OF DAILY LIVING (ADL)
ADLS_ACUITY_SCORE: 44
ADLS_ACUITY_SCORE: 41
ADLS_ACUITY_SCORE: 40
ADLS_ACUITY_SCORE: 40
ADLS_ACUITY_SCORE: 41
ADLS_ACUITY_SCORE: 44
ADLS_ACUITY_SCORE: 41
ADLS_ACUITY_SCORE: 44
ADLS_ACUITY_SCORE: 41
ADLS_ACUITY_SCORE: 40
ADLS_ACUITY_SCORE: 41
ADLS_ACUITY_SCORE: 44

## 2023-06-03 NOTE — PROGRESS NOTES
[facility]    Medicine Progress Note - Hospitalist Service, GOLD TEAM 16    Date of Admission:  5/20/2023    Assessment & Plan   83 year old female patient with a past medical history significant for HTN, HLD, overactive bladder, GERD, basal cell carcinoma, osteoporosis, frequent falls, recurrent UTIs, cervical spondylosis, Alzheimer's dementia who presented from memory care assisted living to [facility] ED after a mechanical fall at home with left hip pain; found to have femoral neck fracture.     #Mechanical fall with resultant Mildly impacted subcapital left femoral neck fracture  ---  Trauma work up in ED included CT C/A/P, CT cervical spine, CT head, CT L Hip and XR pelvis with L hip.   ---   Findings notable for mildly impacted subcapital left femoral neck fracture, small-moderate deep subcutaneous hematoma posterolateral left hip compatible with soft tissue contusion.   ---  S/p closed reduction, percutaneous pinning hip on 5/20  ---   POD #14  ---   Continue PT/OT    #DVT prophylaxis  ---    mg po daily    #Acute postop left hip pain  ---   Tylenol, oxycodone and IV dilaudid     #Hypernatremia (resolved)  ---  Na was 155 at admission in setting of poor PO and fall.   ---   She received 1L NS in ED.  ---   Na level was 134 on 5/29  ---   IVF d/c'd     #JAYLEN (improving)  ---  Creatinine at admission was noted to be 1.02.   ---   Patient received 1L NS in ED.  ---   Creatinine was 1.0 on 5/29  ---   Oral intake improved  ---   Continue holding PTA Losartan     #Alzheimer's dementia  ---   Delirium precautions  ---   PT, OT and social work following  ---   No longer on Aricept     #HTN  ---   Hold losartan due to mild elevation of creatinine.  ---   Blood pressures are really well controlled.  ---   Reassess BMP in AM.     #HLD  ---   Continue PTA statin     #Lung nodules  ---   Few sub 6mm pulmonary nodules noted incidentally on CT C/A/P at admission.  ---    Will need repeat Chest CT in 12 months     #Fluid attenuation anterior mediastinum  ---   Noted incidentally on CT C/A/P at admission.  ---   Consider MR Chest for better characterization once able to reliably remain still and once femoral fracture repaired.     #Moderate Malnutrition  ---   Based on nutrition assessment    ---   Nutritional supplement being provided          Diet: Regular Diet Adult  Diet    DVT Prophylaxis: Pneumatic Compression Devices  Dooley Catheter: Not present  Lines: None     Cardiac Monitoring: None  Code Status: No CPR- Do NOT Intubate    Disposition Plan   Expected Discharge Date: 06/03/2023    Discharge Delays: Other (Add Comment)  Destination: assisted living  Discharge Comments: From [facility].  They want a care conference before they'll accept her back.           Kwasi Rodriguez MD, MHS  Hospitalist Service, GOLD TEAM 16  [facility]  Securely message with [employer] (more info)  Text page via [employer] Paging/Directory   See signed in provider for up to date coverage information  ______________________________________________________________________    Interval History   No complaints  Uneventful night  Pt is resting in her bed when seen  Diarrhea has resolved    Physical Exam   Vital Signs: Temp: (!) 96.7  F (35.9  C) Temp src: Oral BP: 117/66 Pulse: 91   Resp: 16 SpO2: 94 % O2 Device: None (Room air)    Weight: 135 lbs 0 oz  General: Awake, alert, follows simple command, NAD.  HEENT:  NC/AT, neck supple  CVS:  NL s 1 and s2, no m/r/g.  Lungs:  CTA B/L.   Abd:  Soft, + bs, NT, no rebound or gaurding, no fluid shift.  Ext:  Left hip dressing c/d/i  Lymph:  No edema.  Neuro:  Nonfocal.  Musculoskeletal: No calf tenderness to palpation.    Skin:  No rash.  Psychiatry:  Mood and affect appropriate.        Data         Imaging results reviewed over the past 24 hrs:   No results found for this or any previous visit (from the past 24 hour(s)).

## 2023-06-03 NOTE — PLAN OF CARE
Vital signs:  Temp: (!) 96.7  F (35.9  C) Temp src: Oral BP: 117/66 Pulse: 91   Resp: 16 SpO2: 94 % O2 Device: None (Room air)    O2: 94 RA. Denies SOB/CP   Output: Voids via purewick.    Last BM: 6/3/2023.    Activity: A-1 with walker and gaitbelt    Up for meals? Yes    Skin: Bruice on L hip and R forarm   Pain: Denied pain    Neuro / CMS:  Disoriented x4. CMS intact    Dressing: R hip dressing CDI.    Diet: Regular    LDA: L PIV saline locked   Plan: Continue with plan of care    Additional Info:

## 2023-06-03 NOTE — PLAN OF CARE
VS: VSS   O2: RA   Output: Purewick in place   Last BM: Pt having small stool incontinences not enough to get a stool sample.    Activity: Up with A 1. Did not get oob for the shift. Turned and repositioned   Skin: L hip sugical incision, R wrist bruising present  Bruising around L hip    Pain: Denies pain   CMS:  AOx1-2 Bed/Chair Alarms on  Denies numbness and tingling    Dressing: L hip CDI   Diet: Regular    LDA: L PIV SL    Plan: Pending placement, per  note, anticipated discharge to assisted living [facility].    Additional Info:

## 2023-06-04 PROCEDURE — 99232 SBSQ HOSP IP/OBS MODERATE 35: CPT | Performed by: INTERNAL MEDICINE

## 2023-06-04 PROCEDURE — 250N000013 HC RX MED GY IP 250 OP 250 PS 637: Performed by: STUDENT IN AN ORGANIZED HEALTH CARE EDUCATION/TRAINING PROGRAM

## 2023-06-04 PROCEDURE — 250N000013 HC RX MED GY IP 250 OP 250 PS 637: Performed by: INTERNAL MEDICINE

## 2023-06-04 PROCEDURE — 120N000002 HC R&B MED SURG/OB UMMC

## 2023-06-04 RX ORDER — AMLODIPINE BESYLATE 2.5 MG/1
2.5 TABLET ORAL DAILY
Status: DISCONTINUED | OUTPATIENT
Start: 2023-06-04 | End: 2023-06-13 | Stop reason: HOSPADM

## 2023-06-04 RX ADMIN — ACETAMINOPHEN 650 MG: 325 TABLET, FILM COATED ORAL at 19:49

## 2023-06-04 RX ADMIN — AMLODIPINE BESYLATE 2.5 MG: 2.5 TABLET ORAL at 11:50

## 2023-06-04 RX ADMIN — SIMVASTATIN 40 MG: 40 TABLET, FILM COATED ORAL at 19:49

## 2023-06-04 RX ADMIN — ASPIRIN 81 MG CHEWABLE TABLET 162 MG: 81 TABLET CHEWABLE at 08:41

## 2023-06-04 ASSESSMENT — ACTIVITIES OF DAILY LIVING (ADL)
ADLS_ACUITY_SCORE: 44
ADLS_ACUITY_SCORE: 40
ADLS_ACUITY_SCORE: 44
ADLS_ACUITY_SCORE: 40
ADLS_ACUITY_SCORE: 40
ADLS_ACUITY_SCORE: 44
ADLS_ACUITY_SCORE: 40
ADLS_ACUITY_SCORE: 44
ADLS_ACUITY_SCORE: 40
ADLS_ACUITY_SCORE: 44
ADLS_ACUITY_SCORE: 40
ADLS_ACUITY_SCORE: 40

## 2023-06-04 NOTE — PLAN OF CARE
"            VS Vital signs:  Temp: 97.3  F (36.3  C) Temp src: Oral BP: 128/67 Pulse: 96   Resp: 16 SpO2: 96 % O2 Device: None (Room air) Oxygen Delivery: 2 LPM Height: 162.6 cm (5' 4\") Weight: 61.2 kg (135 lb)     O2: 94 RA. Denies SOB/CP   Output: Incont B&B. Voids via purewick.    Last BM: 6/4/2023. Bowel sounds active x4   Activity: A-1 with walker and gaitbelt    Up for meals? Yes    Skin: Bruice on L hip and R forarm. R shin scab    Pain: Denied pain    Neuro / CMS:  Disoriented x4. CMS intact    Dressing: R hip dressing CDI.    Diet: Regular    LDA: L PIV saline locked   Plan: Continue with plan of care    Additional Info:    Pt unable to formulate needs but instead asks to go back to bed when already in bed. Please follow up. Pt may be uncomfortable, sitting in incontinence or want pain med's.                "

## 2023-06-04 NOTE — PROGRESS NOTES
[facility]    Medicine Progress Note - Hospitalist Service, GOLD TEAM 16    Date of Admission:  5/20/2023    Assessment & Plan   83 year old female patient with a past medical history significant for HTN, HLD, overactive bladder, GERD, basal cell carcinoma, osteoporosis, frequent falls, recurrent UTIs, cervical spondylosis, Alzheimer's dementia who presented from memory care assisted living to [facility] ED after a mechanical fall at home with left hip pain; found to have femoral neck fracture.     #Mechanical fall with resultant Mildly impacted subcapital left femoral neck fracture  - Trauma work up in ED included CT C/A/P, CT cervical spine, CT head, CT L Hip and XR pelvis with L hip.   - Findings notable for mildly impacted subcapital left femoral neck fracture, small-moderate deep subcutaneous hematoma posterolateral left hip compatible with soft tissue contusion.   - S/p closed reduction, percutaneous pinning hip on 5/20  - POD #14  - Continue PT/OT    #DVT prophylaxis  -  mg po daily    #Acute postop left hip pain  - Tylenol, oxycodone and IV dilaudid     #Hypernatremia (resolved)  - Na was 155 at admission in setting of poor PO and fall.   - She received 1L NS in ED.  - Na level was 134 on 5/29  - IVF d/c'd     #JAYLEN (improving)  - Creatinine at admission was noted to be 1.02.   - Patient received 1L NS in ED.  - Creatinine was 1.0 on 5/29  - Oral intake improved  - Continue holding PTA Losartan     #Alzheimer's dementia  - Delirium precautions  - PT, OT and social work following  - No longer on Aricept     #HTN  - Hold losartan due to mild elevation of creatinine.  - Blood pressures are really well controlled.  - Reassess BMP in AM.  - Blood pressure elevated; initiate amlodipine 2.5 mg p.o. daily  - Continue to closely monitor blood pressure and evaluate as necessary     #HLD  - Continue PTA statin     #Lung nodules  - Few sub 6mm pulmonary nodules  noted incidentally on CT C/A/P at admission.  - Will need repeat Chest CT in 12 months     #Fluid attenuation anterior mediastinum  - Noted incidentally on CT C/A/P at admission.  - Consider MR Chest for better characterization once able to reliably remain still and once femoral fracture repaired.     #Moderate Malnutrition  - Based on nutrition assessment    - Nutritional supplement being provided       Diet: Regular Diet Adult  Diet    DVT Prophylaxis: Pneumatic Compression Devices  Dooley Catheter: Not present  Lines: None     Cardiac Monitoring: None  Code Status: No CPR- Do NOT Intubate      Clinically Significant Risk Factors              # Hypoalbuminemia: Lowest albumin = 3.2 g/dL at 5/21/2023  6:42 AM, will monitor as appropriate             # Moderate Malnutrition: based on nutrition assessment         Disposition Plan        Patient is pending memory care placement.    Ruy Reynoso DO, S  Hospitalist Service, GOLD TEAM [address] [place] [address]  Securely message with [employer] (more info)  Text page via [facility] Paging/Directory   See signed in provider for up to date coverage information  ______________________________________________________________________    Interval History   Patient resting comfortably upon entering room.  Patient endorses no specific symptomatology.  Per nursing staff, no acute issues or clinical concerns.  Patient is pending placement.    Physical Exam   Vital Signs: Temp: 97.1  F (36.2  C) Temp src: Oral BP: (!) 153/77 Pulse: 97   Resp: 16 SpO2: 96 % O2 Device: None (Room air)    Weight: 135 lbs 0 oz    GENERAL: Awake; alert; no acute distress; well-nourished.  HEENT: Normocephalic; atraumatic; PERRLA; MMM.  CV: RRR; normal S1, S2; no rubs, murmurs, or gallops.  RESP: Lung fields clear to aucultation B/L; no wheezing or crepitations.  GI: Abdomen is soft, nontender, nondistended; no organomegaly; normal bowel sounds.  : Deferred genital examination.    MSK: No clubbing, cyanosis, or edema.  DERM: Skin is intact; no rash, lesions, or skin breakdown.  NEURO: No focal deficits appreciated; strength & sensorium are grossly intact.  PSYCH: No active hallucinations; pleasantly confused.    Medical Decision Making       45 MINUTES SPENT BY ME on the date of service doing chart review, history, exam, documentation & further activities per the note.      Data         Imaging results reviewed over the past 24 hrs:   No results found for this or any previous visit (from the past 24 hour(s)).

## 2023-06-04 NOTE — PLAN OF CARE
"Goal Outcome Evaluation:  VS: /73 (BP Location: Left arm)   Pulse 89   Temp (!) 96.3  F (35.7  C) (Oral)   Resp 16   Ht 1.626 m (5' 4\")   Wt 61.2 kg (135 lb)   LMP  (LMP Unknown)   SpO2 96%   BMI 23.17 kg/m     O2: SpO2 > 92% and stable on RA. LS clear and equal bilaterally. Denies chest pain and SOB.    Output: Pt incontinent of bladder and has brief on. Pt needs brief change Q2-3 hrs.    Last BM: Incontinence bowel, LBM 5/   Activity: Up with A X2. uses walker and gait belt. Tried to get up patient on a chair but pt unable to due to weakness in BLE's.    Skin: WDL except, incision on left hip, blanchable redness on coccyx and bruises on left hip.    Pain: Pain managed with prn tylenol.   CMS: Pt A&O X2. Forgetful. CMS intact.    Dressing: Left hip incisional dressing CDI. Sacral Mapilex applied to redness on coccyx.    Diet: Regular diet and pt ate 100% on RA.    LDA: PIV SL into left forearm.    Equipment: personal belongings,    Plan: Waiting for placement. Continue with plan of care. Call light within reach, pt able to make needs known.    Additional Info:                              "

## 2023-06-04 NOTE — PLAN OF CARE
"  VS: BP (!) 148/68 (BP Location: Left arm, Patient Position: Semi-Jaime's, Cuff Size: Adult Regular)   Pulse 103   Temp (!) 96.5  F (35.8  C) (Oral)   Resp 16   Ht 1.626 m (5' 4\")   Wt 61.2 kg (135 lb)   LMP  (LMP Unknown)   SpO2 95%   BMI 23.17 kg/m      O2: Stable on room air>90%, denies SOB or chest pain   Output: Pure wick overnight   Last BM: 6/3/23   Activity: Not OOB this shift.    Skin: Bruised Surgical hip incision   Pain: Oxycodone 2.5 mg given.    Neuro & CMS: AXO 2. Dementia. Confused and forgetfully  No numbness or tingling   Dressing: Left hip-CDI   Diet: Reg   LDA: L PIV saline locked   Equipment: Pt belongings   Plan: Waiting for placement. Count with POC.    Additional Info: Call light within reach. Makes needs known                            "

## 2023-06-05 ENCOUNTER — APPOINTMENT (OUTPATIENT)
Dept: GENERAL RADIOLOGY | Facility: CLINIC | Age: 83
DRG: 481 | End: 2023-06-05
Payer: COMMERCIAL

## 2023-06-05 LAB
ALBUMIN SERPL BCG-MCNC: 3.2 G/DL (ref 3.5–5.2)
ALP SERPL-CCNC: 87 U/L (ref 35–104)
ALT SERPL W P-5'-P-CCNC: 31 U/L (ref 10–35)
ANION GAP SERPL CALCULATED.3IONS-SCNC: 8 MMOL/L (ref 7–15)
AST SERPL W P-5'-P-CCNC: 23 U/L (ref 10–35)
BASOPHILS # BLD AUTO: 0.1 10E3/UL (ref 0–0.2)
BASOPHILS NFR BLD AUTO: 1 %
BILIRUB SERPL-MCNC: 0.2 MG/DL
BUN SERPL-MCNC: 28.7 MG/DL (ref 8–23)
CALCIUM SERPL-MCNC: 9.7 MG/DL (ref 8.8–10.2)
CHLORIDE SERPL-SCNC: 104 MMOL/L (ref 98–107)
CREAT SERPL-MCNC: 0.89 MG/DL (ref 0.51–0.95)
DEPRECATED HCO3 PLAS-SCNC: 24 MMOL/L (ref 22–29)
EOSINOPHIL # BLD AUTO: 0.2 10E3/UL (ref 0–0.7)
EOSINOPHIL NFR BLD AUTO: 3 %
ERYTHROCYTE [DISTWIDTH] IN BLOOD BY AUTOMATED COUNT: 13.1 % (ref 10–15)
GFR SERPL CREATININE-BSD FRML MDRD: 64 ML/MIN/1.73M2
GLUCOSE BLDC GLUCOMTR-MCNC: 97 MG/DL (ref 70–99)
GLUCOSE SERPL-MCNC: 92 MG/DL (ref 70–99)
HCT VFR BLD AUTO: 33.2 % (ref 35–47)
HGB BLD-MCNC: 11 G/DL (ref 11.7–15.7)
IMM GRANULOCYTES # BLD: 0.1 10E3/UL
IMM GRANULOCYTES NFR BLD: 1 %
LYMPHOCYTES # BLD AUTO: 1.5 10E3/UL (ref 0.8–5.3)
LYMPHOCYTES NFR BLD AUTO: 17 %
MCH RBC QN AUTO: 28.9 PG (ref 26.5–33)
MCHC RBC AUTO-ENTMCNC: 33.1 G/DL (ref 31.5–36.5)
MCV RBC AUTO: 87 FL (ref 78–100)
MONOCYTES # BLD AUTO: 0.9 10E3/UL (ref 0–1.3)
MONOCYTES NFR BLD AUTO: 11 %
NEUTROPHILS # BLD AUTO: 6 10E3/UL (ref 1.6–8.3)
NEUTROPHILS NFR BLD AUTO: 67 %
NRBC # BLD AUTO: 0 10E3/UL
NRBC BLD AUTO-RTO: 0 /100
PLATELET # BLD AUTO: 283 10E3/UL (ref 150–450)
POTASSIUM SERPL-SCNC: 4.3 MMOL/L (ref 3.4–5.3)
PROT SERPL-MCNC: 5.2 G/DL (ref 6.4–8.3)
RBC # BLD AUTO: 3.81 10E6/UL (ref 3.8–5.2)
SODIUM SERPL-SCNC: 136 MMOL/L (ref 136–145)
WBC # BLD AUTO: 8.7 10E3/UL (ref 4–11)

## 2023-06-05 PROCEDURE — 250N000013 HC RX MED GY IP 250 OP 250 PS 637: Performed by: INTERNAL MEDICINE

## 2023-06-05 PROCEDURE — 85025 COMPLETE CBC W/AUTO DIFF WBC: CPT | Performed by: INTERNAL MEDICINE

## 2023-06-05 PROCEDURE — 80053 COMPREHEN METABOLIC PANEL: CPT | Performed by: INTERNAL MEDICINE

## 2023-06-05 PROCEDURE — 250N000013 HC RX MED GY IP 250 OP 250 PS 637: Performed by: STUDENT IN AN ORGANIZED HEALTH CARE EDUCATION/TRAINING PROGRAM

## 2023-06-05 PROCEDURE — 999N000065 XR PELVIS AND HIP LEFT 1 VIEW

## 2023-06-05 PROCEDURE — 120N000002 HC R&B MED SURG/OB UMMC

## 2023-06-05 PROCEDURE — 36415 COLL VENOUS BLD VENIPUNCTURE: CPT | Performed by: INTERNAL MEDICINE

## 2023-06-05 PROCEDURE — 99232 SBSQ HOSP IP/OBS MODERATE 35: CPT | Performed by: INTERNAL MEDICINE

## 2023-06-05 RX ADMIN — LOPERAMIDE HYDROCHLORIDE 2 MG: 2 CAPSULE ORAL at 11:36

## 2023-06-05 RX ADMIN — ASPIRIN 81 MG CHEWABLE TABLET 162 MG: 81 TABLET CHEWABLE at 08:21

## 2023-06-05 RX ADMIN — Medication 2.5 MG: at 19:50

## 2023-06-05 RX ADMIN — ACETAMINOPHEN 650 MG: 325 TABLET, FILM COATED ORAL at 08:25

## 2023-06-05 RX ADMIN — LOPERAMIDE HYDROCHLORIDE 2 MG: 2 CAPSULE ORAL at 19:50

## 2023-06-05 RX ADMIN — AMLODIPINE BESYLATE 2.5 MG: 2.5 TABLET ORAL at 08:21

## 2023-06-05 RX ADMIN — SIMVASTATIN 40 MG: 40 TABLET, FILM COATED ORAL at 19:50

## 2023-06-05 ASSESSMENT — ACTIVITIES OF DAILY LIVING (ADL)
ADLS_ACUITY_SCORE: 48
ADLS_ACUITY_SCORE: 48
ADLS_ACUITY_SCORE: 44

## 2023-06-05 NOTE — PROGRESS NOTES
[facility]    Medicine Progress Note - Hospitalist Service, GOLD TEAM 16    Date of Admission:  5/20/2023    Assessment & Plan   83 year old female patient with a past medical history significant for HTN, HLD, overactive bladder, GERD, basal cell carcinoma, osteoporosis, frequent falls, recurrent UTIs, cervical spondylosis, Alzheimer's dementia who presented from memory care assisted living to [facility] ED after a mechanical fall at home with left hip pain; found to have femoral neck fracture.     #Mechanical fall with resultant Mildly impacted subcapital left femoral neck fracture  - Trauma work up in ED included CT C/A/P, CT cervical spine, CT head, CT L Hip and XR pelvis with L hip.   - Findings notable for mildly impacted subcapital left femoral neck fracture, small-moderate deep subcutaneous hematoma posterolateral left hip compatible with soft tissue contusion.   - S/p closed reduction, percutaneous pinning hip on 5/20  - POD #14  - Continue PT/OT    #DVT prophylaxis  -  mg po daily    #Acute postop left hip pain  - Tylenol, oxycodone and IV dilaudid     #Hypernatremia (resolved)  - Na was 155 at admission in setting of poor PO and fall.   - She received 1L NS in ED.  - Na level was 134 on 5/29  - IVF d/c'd     #JAYLEN (improving)  - Creatinine at admission was noted to be 1.02.   - Patient received 1L NS in ED.  - Creatinine was 1.0 on 5/29  - Oral intake improved  - Continue holding PTA Losartan     #Alzheimer's dementia  - Delirium precautions  - PT, OT and social work following  - No longer on Aricept     #HTN  - Hold losartan due to mild elevation of creatinine.  - Blood pressures are really well controlled.  - Reassess BMP in AM.  - Blood pressure elevated; initiate amlodipine 2.5 mg p.o. daily  - Continue to closely monitor blood pressure and evaluate as necessary     #HLD  - Continue PTA statin     #Lung nodules  - Few sub 6mm pulmonary nodules  noted incidentally on CT C/A/P at admission.  - Will need repeat Chest CT in 12 months     #Fluid attenuation anterior mediastinum  - Noted incidentally on CT C/A/P at admission.  - Consider MR Chest for better characterization once able to reliably remain still and once femoral fracture repaired.     #Moderate Malnutrition  - Based on nutrition assessment    - Nutritional supplement being provided       Diet: Regular Diet Adult  Diet    DVT Prophylaxis: Pneumatic Compression Devices  Dooley Catheter: Not present  Lines: None     Cardiac Monitoring: None  Code Status: No CPR- Do NOT Intubate      Clinically Significant Risk Factors           # Hypercalcemia: corrected calcium is >10.1, will monitor as appropriate    # Hypoalbuminemia: Lowest albumin = 3.2 g/dL at 6/5/2023  5:28 AM, will monitor as appropriate             # Moderate Malnutrition: based on nutrition assessment         Disposition Plan        Patient is pending placement.    Ruy Reynoso DO, S  Hospitalist Service, GOLD TEAM 16  [facility]  Securely message with [employer] (more info)  Text page via [facility] Paging/Directory   See signed in provider for up to date coverage information  ______________________________________________________________________    Interval History   Patient is seated comfortably ordering breakfast.  Patient endorses no specific symptomatology.  Per nursing staff, no acute issues or clinical concerns.  Patient is pending placement.    Physical Exam   Vital Signs: Temp: 96.8  F (36  C) Temp src: Oral BP: 135/69 Pulse: 70   Resp: 16 SpO2: 97 % O2 Device: None (Room air)    Weight: 135 lbs 0 oz    GENERAL: Awake; alert; no acute distress; well-nourished.  HEENT: Normocephalic; atraumatic; PERRLA; MMM.  CV: RRR; normal S1, S2; no rubs, murmurs, or gallops.  RESP: Lung fields clear to aucultation B/L; no wheezing or crepitations.  GI: Abdomen is soft, nontender, nondistended; no organomegaly;  normal bowel sounds.  : Deferred genital examination.   MSK: No clubbing, cyanosis, or edema.  DERM: Skin is intact; no rash, lesions, or skin breakdown.  NEURO: No focal deficits appreciated; strength & sensorium are grossly intact.  PSYCH: No active hallucinations; pleasantly confused    Medical Decision Making       45 MINUTES SPENT BY ME on the date of service doing chart review, history, exam, documentation & further activities per the note.      Data     I have personally reviewed the following data over the past 24 hrs:    8.7  \   11.0 (L)   / 283     136 104 28.7 (H) /  92   4.3 24 0.89 \       ALT: 31 AST: 23 AP: 87 TBILI: 0.2   ALB: 3.2 (L) TOT PROTEIN: 5.2 (L) LIPASE: N/A       Imaging results reviewed over the past 24 hrs:   No results found for this or any previous visit (from the past 24 hour(s)).

## 2023-06-05 NOTE — PROGRESS NOTES
"Orthopedic Surgery Progress Note: 06/05/2023    Subjective:   No acute events overnight. Pain well-controlled. Tolerating a diet. No new concerns or complaints. Denies SOB, chest Pain, fevers, chills.  Patient is doing exceptionally well. Waiting on mem care placement.     Objective:   /69   Pulse 70   Temp 96.8  F (36  C)   Resp 16   Ht 1.626 m (5' 4\")   Wt 61.2 kg (135 lb)   LMP  (LMP Unknown)   SpO2 97%   BMI 23.17 kg/m    No intake/output data recorded.  General: NAD. Resting comfortably in bed.  Respiratory: Breathing comfortably on RA.  Musculoskeletal:    Focused Exam of LEFT Lower Extremity  Incision well healed.   Fires EHL, FHL, TA, GSC, Quad  SILT DP, SP, Saph, Sural, Tibial Nerve Distributions  2+ DP pulse, foot WWP      Laboratory Data:  Lab Results   Component Value Date    WBC 8.7 06/05/2023    HGB 11.0 (L) 06/05/2023     06/05/2023    INR 1.10 05/20/2023         Assessment & Plan:   83-year-old female with closed left valgus impacted femoral neck fracture.      TODAY: Doing well at 2 wk post op check.   *Will obtain xrays of pelvis (routine 2 wk)  -Continue PT while awaiting placement  -OOB daily  -Ok for incision open to air.  -Upon discharge. Follow up with Ortho at 6 wks.     MEDICINE Primary    WBAT LLE  Recommend  mg qDAY for DVT ppx x 6 wks     Follow up 6 weeks, Dr. Rey. Repeat films AP pelvis, L hip cross table lateral.     - Disposition: Pending mem care placement.    Orthopedic surgery staff for this patient is Dr. Rey.    ------------------------------------------------------------------------------------------    Phillip Sarkar MD  Orthopedic Surgery PGY4  [phone]      FOLLOWUP:    Future Appointments   Date Time Provider Department Center   5/21/2023  7:45 AM UR OT WAITLIST [facility] [place]   5/21/2023  7:45 AM Roxanne Mcdonough, PT [facility] [place]   6/7/2023  1:45 PM Dusty Cole MD [facility]   6/15/2023  8:30 AM Dusty Cole MD [facility] "

## 2023-06-05 NOTE — PROGRESS NOTES
"  VS: /69   Pulse 70   Temp 96.8  F (36  C)   Resp 16   Ht 1.626 m (5' 4\")   Wt 61.2 kg (135 lb)   LMP  (LMP Unknown)   SpO2 97%   BMI 23.17 kg/m     O2: RA   Output: incont of urine & stool   Last BM: 6/5, 3  BMs this shift, diarrhea, gave imodium prn   Activity: 1 assist with gait belt & walker   Skin: Intact, blanchable on bottom, cocyx   Pain: Denies, but did give tylenol for L Hip   CMS: intact   Dressing: none   Diet: Reg, need to order for the patient, she can eat herself, no issues   LDA: PIV on LFA   Equipment: Gait belt & walker   Plan: Discharge to memory care facility   Additional Info:        "

## 2023-06-05 NOTE — PLAN OF CARE
"[id number]  /62   Pulse 94   Temp 97.2  F (36.2  C) (Oral)   Resp 16   Ht 1.626 m (5' 4\")   Wt 61.2 kg (135 lb)   LMP  (LMP Unknown)   SpO2 95%   BMI 23.17 kg/m       AOx2-3 hx dementia, cooperative. Requires frequent commands to complete tasks. ORA. Denies CP, SOB, N/V, N/T. Reports minimal pain to surgical site, PRN Tylenol given. Offered ICE declined. L hip dressing CDI. Skin intact ex bruising and blanchable redness to bottom.     A2 with turns, A1 with ambulation using FWW GB.    Incont B/B. Purewick in place overnight, adequate output. Slight redness present to vikram area this AM, discontinued purewick for now. LBM 6/5, loose stools this shift.     Reg diet, tolerating. Encourage PO fluid intake with interactions. Needs assistance ordering meals. Takes pills one at a time.     L PIV SL.     Delirium and fall prec. Slept between cares. Pt able to use call light at times, however needs are anticipated. Frequent rounding. Bed and chair alarms utilized. Care ongoing.    Plan: Awaiting placement.    "

## 2023-06-05 NOTE — PROGRESS NOTES
Care Management Follow Up    Length of Stay (days): 16    Expected Discharge Date: 06/07/23   Concerns to be Addressed: discharge planning     Patient plan of care discussed at interdisciplinary rounds: Yes    Anticipated Discharge Disposition: Memory Care     Anticipated Discharge Services:  (Home PT)  Anticipated Discharge DME: None    Patient/family educated on Medicare website which has current facility and service quality ratings: no  Education Provided on the Discharge Plan: Yes  Patient/Family in Agreement with the Plan: yes    Referrals Placed by CM/SW:  Home Healthcare referrals previously sent, when it was expected pt would return to her longterm;  [facility].    [facility]  Erika Tovar  PH: [phone]   Fax: [phone]  [email or web address]    Private pay costs discussed: private room/amenity fees (MA for LTC eligibility; cost of care at SNF previously discussed).      Additional Information:  HEIDE spoke to pt's daughter, Alex, re: f/u from 5/26/23 conversation. Alex reported that she has been unable to make any phone calls d/t her workload, but now school is out and she can dedicate more time to finding care for pt. Alex reported there is an agency that regulates insurance companies and she will be working with them to get the LTC insurance claim issue resolved. Alex declined Elder Law  resources, stated that pt's  has been with pt for decades, is good to work with, and he LTC experience; so will continue to work with  to spend down pt's money appropriately in order to qualify for MA for LTC services eventually. HEIDE provided L contact information, per Alex's request. HEIDE provided additional information for [facility]; Alex agreeable to working with  [facility] for private pay HC in pt's current longterm and/or  placement.    HEIDE sent referral to liaison, rEika Tovar, at [facility].    [facility]  Erika Tovar  PH: [phone]    Fax: [phone]  [email or web address]        BRANDEN Blanco, HANNAW  [facility]   PHONE: [phone]  Pager: [phone]

## 2023-06-06 ENCOUNTER — APPOINTMENT (OUTPATIENT)
Dept: PHYSICAL THERAPY | Facility: CLINIC | Age: 83
DRG: 481 | End: 2023-06-06
Payer: COMMERCIAL

## 2023-06-06 PROCEDURE — 97530 THERAPEUTIC ACTIVITIES: CPT | Mod: GP | Performed by: PHYSICAL THERAPIST

## 2023-06-06 PROCEDURE — 120N000002 HC R&B MED SURG/OB UMMC

## 2023-06-06 PROCEDURE — 97116 GAIT TRAINING THERAPY: CPT | Mod: GP | Performed by: PHYSICAL THERAPIST

## 2023-06-06 PROCEDURE — 250N000013 HC RX MED GY IP 250 OP 250 PS 637: Performed by: STUDENT IN AN ORGANIZED HEALTH CARE EDUCATION/TRAINING PROGRAM

## 2023-06-06 PROCEDURE — 99232 SBSQ HOSP IP/OBS MODERATE 35: CPT | Performed by: INTERNAL MEDICINE

## 2023-06-06 PROCEDURE — 250N000013 HC RX MED GY IP 250 OP 250 PS 637: Performed by: INTERNAL MEDICINE

## 2023-06-06 RX ADMIN — ACETAMINOPHEN 650 MG: 325 TABLET, FILM COATED ORAL at 08:29

## 2023-06-06 RX ADMIN — SIMVASTATIN 40 MG: 40 TABLET, FILM COATED ORAL at 21:13

## 2023-06-06 RX ADMIN — AMLODIPINE BESYLATE 2.5 MG: 2.5 TABLET ORAL at 08:27

## 2023-06-06 RX ADMIN — ASPIRIN 81 MG CHEWABLE TABLET 162 MG: 81 TABLET CHEWABLE at 08:27

## 2023-06-06 RX ADMIN — LOPERAMIDE HYDROCHLORIDE 2 MG: 2 CAPSULE ORAL at 08:29

## 2023-06-06 ASSESSMENT — ACTIVITIES OF DAILY LIVING (ADL)
ADLS_ACUITY_SCORE: 44
ADLS_ACUITY_SCORE: 48
ADLS_ACUITY_SCORE: 45
ADLS_ACUITY_SCORE: 48
ADLS_ACUITY_SCORE: 45
ADLS_ACUITY_SCORE: 45
ADLS_ACUITY_SCORE: 48
ADLS_ACUITY_SCORE: 48

## 2023-06-06 NOTE — PROGRESS NOTES
"  VS: BP (!) 143/73   Pulse 95   Temp 97  F (36.1  C)   Resp 16   Ht 1.626 m (5' 4\")   Wt 61.2 kg (135 lb)   LMP  (LMP Unknown)   SpO2 96%   BMI 23.17 kg/m     O2: RA   Output: incont of urine & stool   Last BM: 6/6, diarrhea, got imodium in the AM   Activity: 1-2 assist with walker & gait belt   Skin: Intact, except for old L hip incision   Pain: Denies but can get tylenol or 2.5 mg oxy prn   CMS: intact   Dressing: none   Diet: reg   LDA: PIV in LFA   Equipment: Walker, gait belt   Plan: Discharge to memory care facility, TBD   Additional Info:        Kim sat in the chair most of the afternoon.  She is a 2 person assist with gait belt and walker to get her from the chair to bed.  Her daughter came to visit her this afternoon.  "

## 2023-06-06 NOTE — PROGRESS NOTES
[facility]    Medicine Progress Note - Hospitalist Service, GOLD TEAM 16    Date of Admission:  5/20/2023    Assessment & Plan      83 year old female patient with a past medical history significant for HTN, HLD, overactive bladder, GERD, basal cell carcinoma, osteoporosis, frequent falls, recurrent UTIs, cervical spondylosis, Alzheimer's dementia who presented from memory care assisted living to [facility] ED after a mechanical fall at home with left hip pain; found to have femoral neck fracture.     #Mechanical fall with resultant Mildly impacted subcapital left femoral neck fracture  - Trauma work up in ED included CT C/A/P, CT cervical spine, CT head, CT L Hip and XR pelvis with L hip.   - Findings notable for mildly impacted subcapital left femoral neck fracture, small-moderate deep subcutaneous hematoma posterolateral left hip compatible with soft tissue contusion.   - S/p closed reduction, percutaneous pinning hip on 5/20  - Continue PT/OT   -OOB daily  -Ok for incision open to air.  -Upon discharge. Follow up with Ortho at 6 wks      #DVT prophylaxis  -  mg po daily     #Acute postop left hip pain  - Tylenol, oxycodone reduced to q8 PRN      #Hypernatremia (resolved)  - Na was 155 at admission in setting of poor PO and fall.   - Na normalized with IV fluids         #JAYLEN (improving)  - Creatinine at admission was noted to be 1.02.  Now back to normal   - Continue holding PTA Losartan       Mixed Alzheimer's dementia and vascular dementia   - Delirium precautions  - PT, OT and social work following  - No longer on Aricept     #HTN  - Hold losartan due to mild elevation of creatinine.  - now on  amlodipine 2.5 mg p.o. daily  - Continue to closely monitor blood pressure and evaluate as necessary     #HLD  - Continue PTA statin     #Lung nodules  - Few sub 6mm pulmonary nodules noted incidentally on CT C/A/P at admission.  - Will need repeat Chest CT in 12  "months     #Fluid attenuation anterior mediastinum  - Noted incidentally on CT C/A/P at admission. 2.5x2.7 cm in anterior mediastinum   - Consider MR Chest for better characterization once able to reliably remain still   - reviewed previous CT reading, CT form 6/22/2021 describes  \" 2.5 cm low-attenuation nodule anterior mediastinum most compatible with a benign cyst.       #Moderate Malnutrition  - Based on nutrition assessment    - Nutritional supplement being provided              Diet: Regular Diet Adult  Diet    DVT Prophylaxis: aspirin 162 mg   Dooely Catheter: Not present  Lines: None     Cardiac Monitoring: None  Code Status: No CPR- Do NOT Intubate      Clinically Significant Risk Factors           # Hypercalcemia: corrected calcium is >10.1, will monitor as appropriate    # Hypoalbuminemia: Lowest albumin = 3.2 g/dL at 6/5/2023  5:28 AM, will monitor as appropriate             # Moderate Malnutrition: based on nutrition assessment         Disposition Plan      TBD        Janet Bright MD  Hospitalist Service, GOLD TEAM 16  [facility]  Securely message with [employer] (more info)  Text page via [employer] Paging/Directory   See signed in provider for up to date coverage information  ______________________________________________________________________    Interval History      Sitting up in chair, denies pain if does not move, no shortness of breath  No chest pain  No nausea vomiting       Physical Exam   Vital Signs: Temp: 97  F (36.1  C) Temp src: Oral BP: (!) 143/73 Pulse: 95   Resp: 16 SpO2: 96 % O2 Device: None (Room air)    Weight: 135 lbs 0 oz  General appearance: awake alert in no apparent distress     HEENT: EOMI and PEARLA, sclera nonicteric, moist mucus membranes, head atraumatic  NECK: supple  RESPIRATORY: lungs are clear   CARDIOVASCULAR: normal S1S2 regular rate and rhythm  GASTROINTESTINAL: abdomen is, soft,  non-distended, non-tender with normal bowel " sounds.  NEUROLOGIC: awake alert, speech clear         Data         Imaging results reviewed over the past 24 hrs:   No results found for this or any previous visit (from the past 24 hour(s)).  Recent Labs   Lab 06/05/23  1259 06/05/23  0528   WBC  --  8.7   HGB  --  11.0*   MCV  --  87   PLT  --  283   NA  --  136   POTASSIUM  --  4.3   CHLORIDE  --  104   CO2  --  24   BUN  --  28.7*   CR  --  0.89   ANIONGAP  --  8   SUZY  --  9.7   GLC 97 92   ALBUMIN  --  3.2*   PROTTOTAL  --  5.2*   BILITOTAL  --  0.2   ALKPHOS  --  87   ALT  --  31   AST  --  23

## 2023-06-06 NOTE — PLAN OF CARE
Goal Outcome Evaluation:      Patient vital signs are at baseline: Yes  Patient able to ambulate as they were prior to admission or with assist devices provided by therapies during their stay:  Yes  Patient MUST void prior to discharge:  Yes  Patient able to tolerate oral intake:  Yes  Pain has adequate pain control using Oral analgesics:  Yes  Does patient have an identified :  Yes  Has goal D/C date and time been discussed with patient:  Yes      VS: Temp: (!) 96.5  F (35.8  C) Temp src: Oral BP: 133/69 Pulse: 91   Resp: 16 SpO2: 95 % O2 Device: None (Room air)      O2: stable on RA. LS clear and equal bilaterally. Denies chest pain and SOB.    Output: Voids spontaneously without difficulty incontinent of urine and bowels. No BM since last evening after Imodium was given   Last BM: BM 6/5/23 diarrhea,  denies abdominal discomfort. BS active / passing flatus.    Activity: Up with Assist of 1, walker and gait belt.   Skin: WDL except, left hip incision and some swelling hip and both feet    Pain: Pain managed with Oxy and Tylenol   CMS: Intact, Aox3, but not able to follow directions well. Denies numbness and tingling.   Dressing:    Diet: Regular diet. Denies nausea/vomiting.      LDA: PIV SL    Equipment: IV pole, personal belongings,    Plan: Fall precautions maintained / Continue with plan of care. Call light within reach, pt able to make needs known.    Additional Info: Waiting for a placement.

## 2023-06-06 NOTE — PROGRESS NOTES
Care Management Follow Up    Length of Stay (days): 17    Expected Discharge Date: 06/03/2023     Concerns to be Addressed: discharge planning     Patient plan of care discussed at interdisciplinary rounds: Yes    Anticipated Discharge Disposition: [facility]     Anticipated Discharge Services:  (Home PT); all I/ADL assistance from staff  Anticipated Discharge DME: None    Patient/family educated on Medicare website which has current facility and service quality ratings: no  Education Provided on the Discharge Plan: Yes  Patient/Family in Agreement with the Plan: yes    Private pay costs discussed: private room/amenity fees (MA for LTC eligibility; cost of care at SNF previously discussed).    Additional Information:  SW received email correspondence from Erika Tovar, stated that a thorough consultation was had with daughterAlex, yesterday and tours have been scheduled (see below). Erika provided SW with fax #s to send referrals to, stated that there are back up options as well.    Referrals Placed by CM/SW:  (Home Health Services); [facility];  [facility]    [facility]  Erika Tovar  PH: [phone]   Fax: [phone]  [email or web address]      [facility] - TOUR 6/6  Fax: [phone]  6/6: Referral hand-faxed.    [facility] - TOUR 6/7  Fax: [phone]  6/6: Referral hand-faxed.    [facility] - TOUR 6/7  Phone: [phone]   Fax: [phone]  6/6: Referral hand-faxed.          BRANDEN Blanco, LSW  [facility]   PHONE: [phone]  Pager: [phone]

## 2023-06-07 ENCOUNTER — PRE VISIT (OUTPATIENT)
Dept: DERMATOLOGY | Facility: CLINIC | Age: 83
End: 2023-06-07

## 2023-06-07 ENCOUNTER — TELEPHONE (OUTPATIENT)
Dept: DERMATOLOGY | Facility: CLINIC | Age: 83
End: 2023-06-07

## 2023-06-07 PROCEDURE — 99232 SBSQ HOSP IP/OBS MODERATE 35: CPT | Performed by: INTERNAL MEDICINE

## 2023-06-07 PROCEDURE — 120N000002 HC R&B MED SURG/OB UMMC

## 2023-06-07 PROCEDURE — 250N000013 HC RX MED GY IP 250 OP 250 PS 637: Performed by: INTERNAL MEDICINE

## 2023-06-07 PROCEDURE — 99207 PR CDG-CUT & PASTE-POTENTIAL IMPACT ON LEVEL: CPT | Performed by: INTERNAL MEDICINE

## 2023-06-07 PROCEDURE — 250N000013 HC RX MED GY IP 250 OP 250 PS 637: Performed by: STUDENT IN AN ORGANIZED HEALTH CARE EDUCATION/TRAINING PROGRAM

## 2023-06-07 RX ADMIN — ACETAMINOPHEN 650 MG: 325 TABLET, FILM COATED ORAL at 19:49

## 2023-06-07 RX ADMIN — LOPERAMIDE HYDROCHLORIDE 2 MG: 2 CAPSULE ORAL at 13:49

## 2023-06-07 RX ADMIN — AMLODIPINE BESYLATE 2.5 MG: 2.5 TABLET ORAL at 09:22

## 2023-06-07 RX ADMIN — SIMVASTATIN 40 MG: 40 TABLET, FILM COATED ORAL at 19:49

## 2023-06-07 RX ADMIN — ASPIRIN 81 MG CHEWABLE TABLET 162 MG: 81 TABLET CHEWABLE at 09:23

## 2023-06-07 ASSESSMENT — ACTIVITIES OF DAILY LIVING (ADL)
ADLS_ACUITY_SCORE: 44

## 2023-06-07 NOTE — PROGRESS NOTES
[facility]    Medicine Progress Note - Hospitalist Service, GOLD TEAM 16    Date of Admission:  5/20/2023    Assessment & Plan      83 year old female patient with a past medical history significant for HTN, HLD, overactive bladder, GERD, basal cell carcinoma, osteoporosis, frequent falls, recurrent UTIs, cervical spondylosis, Alzheimer's dementia who presented from memory care assisted living to [facility] ED after a mechanical fall at home with left hip pain; found to have femoral neck fracture.     #Mechanical fall with resultant Mildly impacted subcapital left femoral neck fracture  - Trauma work up in ED included CT C/A/P, CT cervical spine, CT head, CT L Hip and XR pelvis with L hip.   - Findings notable for mildly impacted subcapital left femoral neck fracture, small-moderate deep subcutaneous hematoma posterolateral left hip compatible with soft tissue contusion.   - S/p closed reduction, percutaneous pinning hip on 5/20  - Continue PT/OT   -OOB daily  -Ok for incision open to air.  -Upon discharge. Follow up with Ortho at 6 wks      #DVT prophylaxis  -  mg po daily     #Acute postop left hip pain  - Tylenol, oxycodone reduced to q8 PRN      #Hypernatremia (resolved)  - Na was 155 at admission in setting of poor PO and fall.   - Na normalized with IV fluids         #JAYLEN (improving)  - Creatinine at admission was noted to be 1.02.  Now back to normal   - Continue holding PTA Losartan       Mixed Alzheimer's dementia and vascular dementia   - Delirium precautions  - PT, OT and social work following  - No longer on Aricept     #HTN  - Hold losartan due to mild elevation of creatinine.  - now on  amlodipine 2.5 mg p.o. daily  - Continue to closely monitor blood pressure and evaluate as necessary     #HLD  - Continue PTA statin     #Lung nodules  - Few sub 6mm pulmonary nodules noted incidentally on CT C/A/P at admission.  - Will need repeat Chest CT in 12  "months     #Fluid attenuation anterior mediastinum  - Noted incidentally on CT C/A/P at admission. 2.5x2.7 cm in anterior mediastinum   - Consider MR Chest for better characterization once able to reliably remain still   - reviewed previous CT reading, CT form 6/22/2021 describes  \" 2.5 cm low-attenuation nodule anterior mediastinum most compatible with a benign cyst.       #Moderate Malnutrition  - Based on nutrition assessment    - Nutritional supplement being provided              Diet: Regular Diet Adult  Diet    DVT Prophylaxis: aspirin 162 mg   Dooley Catheter: Not present  Lines: None     Cardiac Monitoring: None  Code Status: No CPR- Do NOT Intubate      Clinically Significant Risk Factors              # Hypoalbuminemia: Lowest albumin = 3.2 g/dL at 6/5/2023  5:28 AM, will monitor as appropriate             # Moderate Malnutrition: based on nutrition assessment         Disposition Plan      TBD        Janet Bright MD  Hospitalist Service, GOLD TEAM 16  [facility]  Securely message with [employer] (more info)  Text page via [employer] Paging/Directory   See signed in provider for up to date coverage information  ______________________________________________________________________    Interval History      Doing ok, no new changes, no chest pain  No shortness of breath  No nausea vomiting, tolerating po     Physical Exam   Vital Signs: Temp: 96.9  F (36.1  C) Temp src: Oral BP: (!) 146/73 Pulse: 96   Resp: 16 SpO2: 96 % O2 Device: None (Room air)    Weight: 135 lbs 0 oz  General appearance: awake alert in no apparent distress     HEENT: EOMI and PEARLA, sclera nonicteric, moist mucus membranes, head atraumatic  NECK: supple  RESPIRATORY: lungs are clear   CARDIOVASCULAR: normal S1S2 regular rate and rhythm  GASTROINTESTINAL: abdomen is, soft,  non-distended, non-tender with normal bowel sounds.  NEUROLOGIC: awake alert, speech clear         Data         Imaging results " reviewed over the past 24 hrs:   No results found for this or any previous visit (from the past 24 hour(s)).  Recent Labs   Lab 06/05/23  1259 06/05/23  0528   WBC  --  8.7   HGB  --  11.0*   MCV  --  87   PLT  --  283   NA  --  136   POTASSIUM  --  4.3   CHLORIDE  --  104   CO2  --  24   BUN  --  28.7*   CR  --  0.89   ANIONGAP  --  8   SUZY  --  9.7   GLC 97 92   ALBUMIN  --  3.2*   PROTTOTAL  --  5.2*   BILITOTAL  --  0.2   ALKPHOS  --  87   ALT  --  31   AST  --  23

## 2023-06-07 NOTE — PLAN OF CARE
"Patient A/Ox3, time seems to be an issue for patient, can be confused about other things as well but has been pleasant this shift. VSS ex BP slightly elevated. Denies CP, SOB, dizziness/LH. LSCTA. +fl/BS. Voiding well incontinent in pad. Tolerating regular diet without NV. Activity level is fair, pt sleeping for much of late evening into night. IV SL. No new pain concerns. Patient has demonstrated ability to call appropriately, sometimes asking repeatedly to \"go to bed\" even when she is in bed. Patient is resting with call light within reach. Will continue to monitor.  "

## 2023-06-07 NOTE — TELEPHONE ENCOUNTER
I tried to call and check patient in for her telephone consult but her daughter answered and stated that patient is in the hospital and broke her hip, she will need to go to a nursing home when discharged and cannot do the appointment. I advised I will have our  call to reschedule.     Mallika BOLANOS CMA

## 2023-06-08 ENCOUNTER — APPOINTMENT (OUTPATIENT)
Dept: PHYSICAL THERAPY | Facility: CLINIC | Age: 83
DRG: 481 | End: 2023-06-08
Payer: COMMERCIAL

## 2023-06-08 LAB
ALBUMIN SERPL BCG-MCNC: 3.2 G/DL (ref 3.5–5.2)
ALP SERPL-CCNC: 93 U/L (ref 35–104)
ALT SERPL W P-5'-P-CCNC: 24 U/L (ref 10–35)
ANION GAP SERPL CALCULATED.3IONS-SCNC: 8 MMOL/L (ref 7–15)
AST SERPL W P-5'-P-CCNC: 18 U/L (ref 10–35)
BASOPHILS # BLD AUTO: 0.1 10E3/UL (ref 0–0.2)
BASOPHILS NFR BLD AUTO: 1 %
BILIRUB SERPL-MCNC: 0.2 MG/DL
BUN SERPL-MCNC: 30.2 MG/DL (ref 8–23)
CALCIUM SERPL-MCNC: 9.6 MG/DL (ref 8.8–10.2)
CHLORIDE SERPL-SCNC: 103 MMOL/L (ref 98–107)
CREAT SERPL-MCNC: 0.87 MG/DL (ref 0.51–0.95)
DEPRECATED HCO3 PLAS-SCNC: 26 MMOL/L (ref 22–29)
EOSINOPHIL # BLD AUTO: 0.2 10E3/UL (ref 0–0.7)
EOSINOPHIL NFR BLD AUTO: 3 %
ERYTHROCYTE [DISTWIDTH] IN BLOOD BY AUTOMATED COUNT: 13.3 % (ref 10–15)
GFR SERPL CREATININE-BSD FRML MDRD: 66 ML/MIN/1.73M2
GLUCOSE SERPL-MCNC: 96 MG/DL (ref 70–99)
HCT VFR BLD AUTO: 34.1 % (ref 35–47)
HGB BLD-MCNC: 11.3 G/DL (ref 11.7–15.7)
IMM GRANULOCYTES # BLD: 0.1 10E3/UL
IMM GRANULOCYTES NFR BLD: 1 %
LYMPHOCYTES # BLD AUTO: 1.3 10E3/UL (ref 0.8–5.3)
LYMPHOCYTES NFR BLD AUTO: 17 %
MCH RBC QN AUTO: 28.8 PG (ref 26.5–33)
MCHC RBC AUTO-ENTMCNC: 33.1 G/DL (ref 31.5–36.5)
MCV RBC AUTO: 87 FL (ref 78–100)
MONOCYTES # BLD AUTO: 0.9 10E3/UL (ref 0–1.3)
MONOCYTES NFR BLD AUTO: 11 %
NEUTROPHILS # BLD AUTO: 5.2 10E3/UL (ref 1.6–8.3)
NEUTROPHILS NFR BLD AUTO: 67 %
NRBC # BLD AUTO: 0 10E3/UL
NRBC BLD AUTO-RTO: 0 /100
PLATELET # BLD AUTO: 267 10E3/UL (ref 150–450)
POTASSIUM SERPL-SCNC: 4.3 MMOL/L (ref 3.4–5.3)
PROT SERPL-MCNC: 5.3 G/DL (ref 6.4–8.3)
RBC # BLD AUTO: 3.93 10E6/UL (ref 3.8–5.2)
SODIUM SERPL-SCNC: 137 MMOL/L (ref 136–145)
WBC # BLD AUTO: 7.7 10E3/UL (ref 4–11)

## 2023-06-08 PROCEDURE — 250N000013 HC RX MED GY IP 250 OP 250 PS 637: Performed by: INTERNAL MEDICINE

## 2023-06-08 PROCEDURE — 99232 SBSQ HOSP IP/OBS MODERATE 35: CPT | Performed by: INTERNAL MEDICINE

## 2023-06-08 PROCEDURE — 36415 COLL VENOUS BLD VENIPUNCTURE: CPT | Performed by: INTERNAL MEDICINE

## 2023-06-08 PROCEDURE — 97530 THERAPEUTIC ACTIVITIES: CPT | Mod: GP | Performed by: PHYSICAL THERAPIST

## 2023-06-08 PROCEDURE — 250N000013 HC RX MED GY IP 250 OP 250 PS 637: Performed by: STUDENT IN AN ORGANIZED HEALTH CARE EDUCATION/TRAINING PROGRAM

## 2023-06-08 PROCEDURE — 80053 COMPREHEN METABOLIC PANEL: CPT | Performed by: INTERNAL MEDICINE

## 2023-06-08 PROCEDURE — 85004 AUTOMATED DIFF WBC COUNT: CPT | Performed by: INTERNAL MEDICINE

## 2023-06-08 PROCEDURE — 97116 GAIT TRAINING THERAPY: CPT | Mod: GP | Performed by: PHYSICAL THERAPIST

## 2023-06-08 PROCEDURE — 120N000002 HC R&B MED SURG/OB UMMC

## 2023-06-08 RX ORDER — LOSARTAN POTASSIUM 25 MG/1
25 TABLET ORAL DAILY
Status: DISCONTINUED | OUTPATIENT
Start: 2023-06-08 | End: 2023-06-12

## 2023-06-08 RX ADMIN — ASPIRIN 81 MG CHEWABLE TABLET 162 MG: 81 TABLET CHEWABLE at 09:01

## 2023-06-08 RX ADMIN — SIMVASTATIN 40 MG: 40 TABLET, FILM COATED ORAL at 20:40

## 2023-06-08 RX ADMIN — AMLODIPINE BESYLATE 2.5 MG: 2.5 TABLET ORAL at 09:01

## 2023-06-08 RX ADMIN — LOSARTAN POTASSIUM 25 MG: 25 TABLET, FILM COATED ORAL at 09:45

## 2023-06-08 ASSESSMENT — ACTIVITIES OF DAILY LIVING (ADL)
ADLS_ACUITY_SCORE: 44
ADLS_ACUITY_SCORE: 44
ADLS_ACUITY_SCORE: 46
ADLS_ACUITY_SCORE: 46
ADLS_ACUITY_SCORE: 44
ADLS_ACUITY_SCORE: 46
ADLS_ACUITY_SCORE: 44

## 2023-06-08 NOTE — PROGRESS NOTES
Care Management Follow Up    Length of Stay (days): 19    Expected Discharge Date: 06/10/2023     Concerns to be Addressed: discharge planning     Patient plan of care discussed at interdisciplinary rounds: Yes    Anticipated Discharge Disposition: Assisted Living ()     Anticipated Discharge Services:  (Home PT; all I/ADL assistance from  staff)  Anticipated Discharge DME: None    Patient/family educated on Medicare website which has current facility and service quality ratings: no  Education Provided on the Discharge Plan: Yes  Patient/Family in Agreement with the Plan: yes    Referrals Placed by CM/SW:  (Home Healthcare; [facility]; [facility])    [facility]  Erika Tovar  PH: [phone]   Fax: [phone]  [email or web address]        [facility]  Fax: [phone]  6/6: Referral hand-faxed.     [facility] - TOUR 6/7  Fax: [phone]  6/6: Referral hand-faxed.     [facility] - TOUR 6/7  Phone: [phone]   Fax: [phone]  6/6: Referral hand-faxed.  6/8: PC with Lakshmi reporting that RN would be coming out this afternoon (6/8) to do RN assessment around [id number]. Can possibly admit on next Tues (6/13).     Private pay costs discussed: Not applicable    Additional Information:  HEIDE received phone call from Lakshmi with [facility] in [place] following up on referral sent for pt. Lakshmi asked if their RN could come to hospital tomorrow (~0900) to do assessment on pt. HEIDE confirmed this would be good. Lakshmi reported that daughter toured [facility] yesterday and was aware of the assessment for tomorrow. Lakshmi reported that if all went well they would look at admitting pt to their facility possibly on Tuesday (6/13) of next week.     HEIDE received call back from Lakshmi reporting that RN would actually be coming out today around [phone].     ADDENDUM 1450: HEIDE received phone call from THOM Pizarro at [facility] (pt's [facility] PTA) requesting  update on pt discharge plan. HEIDE provided brief update that HEIDE still looking for placement and no official discharge plan in place yet. Moira will call back next week for another update.     Beba Moffett, [facility]   [address] Coverage  [address] [place] [address] Ph: [phone]

## 2023-06-08 NOTE — PLAN OF CARE
"VS: Blood pressure 130/62, pulse 96, temperature 97  F (36.1  C), temperature source Oral, resp. rate 16, height 1.626 m (5' 4\"), weight 61.2 kg (135 lb), SpO2 95 %.   O2: 95% on RA.    Output: incont of urine & stool   Last BM: 06/07/23 diarrhea, got imodium in the AM   Activity: 2 assist with walker & gait belt   Skin: Intact, except for old L hip incision. Bruises on forearms. Bruise at incision site. Protective mepilex on coccyx.    Pain: Denies    CMS: AOX3. Can be confused to time.    Dressing: none   Diet: reg   LDA: L PIV SL.    Equipment: Walker, gait belt   Plan: Discharge to memory care facility, [facility]   Additional Info:                            "

## 2023-06-08 NOTE — PROGRESS NOTES
[facility]    Medicine Progress Note - Hospitalist Service, GOLD TEAM 16    Date of Admission:  5/20/2023    Assessment & Plan      83 year old female patient with a past medical history significant for HTN, HLD, overactive bladder, GERD, basal cell carcinoma, osteoporosis, frequent falls, recurrent UTIs, cervical spondylosis, Alzheimer's dementia who presented from memory care assisted living to [facility] ED after a mechanical fall at home with left hip pain; found to have femoral neck fracture.     #Mechanical fall with resultant Mildly impacted subcapital left femoral neck fracture  - Trauma work up in ED included CT C/A/P, CT cervical spine, CT head, CT L Hip and XR pelvis with L hip.   - Findings notable for mildly impacted subcapital left femoral neck fracture, small-moderate deep subcutaneous hematoma posterolateral left hip compatible with soft tissue contusion.   - S/p closed reduction, percutaneous pinning hip on 5/20  - Continue PT/OT   -OOB daily  -Ok for incision open to air.  -Upon discharge. Follow up with Ortho at 6 wks      #DVT prophylaxis  -  mg po daily     #Acute postop left hip pain  - Tylenol, oxycodone reduced to q8 PRN      #Hypernatremia (resolved)  - Na was 155 at admission in setting of poor PO and fall.   - Na normalized with IV fluids         #JAYLEN (improving)  - Creatinine at admission was noted to be 1.02.  Now back to normal   - Continue holding PTA Losartan     Multiple looser stool  - monitor, not on laxatives, if continues check c diff     Mixed Alzheimer's dementia and vascular dementia   - Delirium precautions  - PT, OT and social work following  - No longer on Aricept     #HTN  - Held losartan due to mild elevation of creatinine.  - now on  amlodipine 2.5 mg p.o. daily  - blood pressure  Up, started back losartan at lower doses 6/8   - Continue to closely monitor blood pressure and evaluate as necessary     #HLD  -  "Continue PTA statin     #Lung nodules  - Few sub 6mm pulmonary nodules noted incidentally on CT C/A/P at admission.  - Will need repeat Chest CT in 12 months     #Fluid attenuation anterior mediastinum  - Noted incidentally on CT C/A/P at admission. 2.5x2.7 cm in anterior mediastinum   - Consider MR Chest for better characterization once able to reliably remain still   - reviewed previous CT reading, CT form 6/22/2021 describes  \" 2.5 cm low-attenuation nodule anterior mediastinum most compatible with a benign cyst.       #Moderate Malnutrition  - Based on nutrition assessment    - Nutritional supplement being provided              Diet: Regular Diet Adult  Diet    DVT Prophylaxis: aspirin 162 mg   Dooley Catheter: Not present  Lines: None     Cardiac Monitoring: None  Code Status: No CPR- Do NOT Intubate      Clinically Significant Risk Factors           # Hypercalcemia: corrected calcium is >10.1, will monitor as appropriate    # Hypoalbuminemia: Lowest albumin = 3.2 g/dL at 6/8/2023  6:41 AM, will monitor as appropriate             # Moderate Malnutrition: based on nutrition assessment         Disposition Plan      TBD        Janet Bright MD  Hospitalist Service, GOLD TEAM [address] [place] [address]  Securely message with [employer] (more info)  Text page via [employer] Paging/Directory   See signed in provider for up to date coverage information  ______________________________________________________________________    Interval History      Doing ok, not drinking too much, pain ok, no chest pain  No shortness of breath  No nausea vomiting   Monitor stools as seem to be increasing     Physical Exam   Vital Signs: Temp: 98.5  F (36.9  C) Temp src: Oral BP: 121/71 Pulse: 99   Resp: 16 SpO2: 95 % O2 Device: None (Room air)    Weight: 135 lbs 0 oz  General appearance: awake alert in no apparent distress     HEENT: EOMI and PEARLA, sclera nonicteric, moist mucus membranes, head " atraumatic  NECK: supple  RESPIRATORY: lungs are clear   CARDIOVASCULAR: normal S1S2 regular rate and rhythm  GASTROINTESTINAL: abdomen is, soft,  non-distended, non-tender with normal bowel sounds.  NEUROLOGIC: awake alert, speech clear         Data     I have personally reviewed the following data over the past 24 hrs:    7.7  \   11.3 (L)   / 267     137 103 30.2 (H) /  96   4.3 26 0.87 \       ALT: 24 AST: 18 AP: 93 TBILI: 0.2   ALB: 3.2 (L) TOT PROTEIN: 5.3 (L) LIPASE: N/A       Imaging results reviewed over the past 24 hrs:   No results found for this or any previous visit (from the past 24 hour(s)).  Recent Labs   Lab 06/08/23  0641 06/05/23  1259 06/05/23  0528   WBC 7.7  --  8.7   HGB 11.3*  --  11.0*   MCV 87  --  87     --  283     --  136   POTASSIUM 4.3  --  4.3   CHLORIDE 103  --  104   CO2 26  --  24   BUN 30.2*  --  28.7*   CR 0.87  --  0.89   ANIONGAP 8  --  8   SUZY 9.6  --  9.7   GLC 96 97 92   ALBUMIN 3.2*  --  3.2*   PROTTOTAL 5.3*  --  5.2*   BILITOTAL 0.2  --  0.2   ALKPHOS 93  --  87   ALT 24  --  31   AST 18  --  23

## 2023-06-08 NOTE — PLAN OF CARE
"1900-0730  /71 (BP Location: Left arm)   Pulse 99   Temp 98.5  F (36.9  C) (Oral)   Resp 16   Ht 1.626 m (5' 4\")   Wt 61.2 kg (135 lb)   LMP  (LMP Unknown)   SpO2 95%   BMI 23.17 kg/m       AOx2-3 hx dementia, cooperative. ORA. Denies CP, SOB, N/V, N/T. Reports minimal pain to surgical site, PRN Tylenol given. Offered ICE declined. L hip MADHU, hard lump around site. Skin intact ex bruising to L hip.     A2 with turns, ambulating using FWW GB.     Incont B/B. Purewick in place overnight, adequate output. LBM 6/8.     Reg diet, tolerating. Encourage PO fluid intake with interactions. Needs assistance ordering meals. Takes pills one at a time.      No IV access     Delirium and fall prec. Slept between cares. Pt able to use call light at times, however needs are anticipated. Frequent rounding. Bed and chair alarms utilized. Care ongoing.     Plan: Awaiting placement.  "

## 2023-06-08 NOTE — PROGRESS NOTES
"BP (!) 163/81   Pulse 88   Temp (!) 96.2  F (35.7  C) (Oral)   Resp 16   Ht 1.626 m (5' 4\")   Wt 61.2 kg (135 lb)   LMP  (LMP Unknown)   SpO2 99%   BMI 23.17 kg/m      AOx2-3 hx dementia, cooperative. Denies CP, SOB, N/V, N/T. Reports minimal pain to surgical site, PRN Tylenol given. Offered ICE declined. L hip MADHU, hard lump around site. Skin intact ex bruising to L hip.     Uneventful shift.     A2 with turns, ambulating using FWW GB.     Incont B/B. Pads changed prn, adequate output. LBM 6/8.     Reg diet, tolerating. Encourage PO fluid intake with interactions. Needs assistance ordering meals. Takes pills one at a time.      No IV access     Delirium and fall prec. Slept between cares. Pt able to use call light at times, however needs are anticipated. Frequent rounding. Bed and chair alarms utilized. Care ongoing.     Plan: Awaiting placement.  "

## 2023-06-09 ENCOUNTER — APPOINTMENT (OUTPATIENT)
Dept: PHYSICAL THERAPY | Facility: CLINIC | Age: 83
DRG: 481 | End: 2023-06-09
Payer: COMMERCIAL

## 2023-06-09 PROCEDURE — 97116 GAIT TRAINING THERAPY: CPT | Mod: GP | Performed by: PHYSICAL THERAPIST

## 2023-06-09 PROCEDURE — 250N000013 HC RX MED GY IP 250 OP 250 PS 637: Performed by: INTERNAL MEDICINE

## 2023-06-09 PROCEDURE — 97110 THERAPEUTIC EXERCISES: CPT | Mod: GP | Performed by: PHYSICAL THERAPIST

## 2023-06-09 PROCEDURE — 120N000002 HC R&B MED SURG/OB UMMC

## 2023-06-09 PROCEDURE — 99207 PR CDG-CUT & PASTE-POTENTIAL IMPACT ON LEVEL: CPT | Performed by: INTERNAL MEDICINE

## 2023-06-09 PROCEDURE — 250N000013 HC RX MED GY IP 250 OP 250 PS 637: Performed by: STUDENT IN AN ORGANIZED HEALTH CARE EDUCATION/TRAINING PROGRAM

## 2023-06-09 PROCEDURE — 97530 THERAPEUTIC ACTIVITIES: CPT | Mod: GP | Performed by: PHYSICAL THERAPIST

## 2023-06-09 PROCEDURE — 99232 SBSQ HOSP IP/OBS MODERATE 35: CPT | Performed by: INTERNAL MEDICINE

## 2023-06-09 RX ADMIN — AMLODIPINE BESYLATE 2.5 MG: 2.5 TABLET ORAL at 08:52

## 2023-06-09 RX ADMIN — ACETAMINOPHEN 650 MG: 325 TABLET, FILM COATED ORAL at 20:28

## 2023-06-09 RX ADMIN — ACETAMINOPHEN 650 MG: 325 TABLET, FILM COATED ORAL at 08:51

## 2023-06-09 RX ADMIN — LOSARTAN POTASSIUM 25 MG: 25 TABLET, FILM COATED ORAL at 08:52

## 2023-06-09 RX ADMIN — LOPERAMIDE HYDROCHLORIDE 2 MG: 2 CAPSULE ORAL at 20:35

## 2023-06-09 RX ADMIN — ASPIRIN 81 MG CHEWABLE TABLET 162 MG: 81 TABLET CHEWABLE at 08:51

## 2023-06-09 RX ADMIN — SIMVASTATIN 40 MG: 40 TABLET, FILM COATED ORAL at 20:28

## 2023-06-09 RX ADMIN — Medication 2.5 MG: at 00:27

## 2023-06-09 ASSESSMENT — ACTIVITIES OF DAILY LIVING (ADL)
ADLS_ACUITY_SCORE: 44
ADLS_ACUITY_SCORE: 46
ADLS_ACUITY_SCORE: 44
ADLS_ACUITY_SCORE: 46
ADLS_ACUITY_SCORE: 44
ADLS_ACUITY_SCORE: 44

## 2023-06-09 NOTE — PLAN OF CARE
VS: VSS. Denies CP/SOB. A/O x4, intermittent confusion.   O2: >90% on RA    Output: Voiding via purewick, pt incontinent   Last BM: 6/8; incontinent   Activity: 1-2 assist with gait belt & walker    Skin: Intact, except old L hip incision     Pain: Denies   CMS: intact   Dressing: None   Diet: Regular, tolerating well.    LDA: None   Equipment: Walker, gait belt   Plan: Awaiting placement   Additional Info:

## 2023-06-09 NOTE — PROGRESS NOTES
[facility]    Medicine Progress Note - Hospitalist Service, GOLD TEAM 16    Date of Admission:  5/20/2023    Assessment & Plan      83 year old female patient with a past medical history significant for HTN, HLD, overactive bladder, GERD, basal cell carcinoma, osteoporosis, frequent falls, recurrent UTIs, cervical spondylosis, Alzheimer's dementia who presented from memory care assisted living to [facility] ED after a mechanical fall at home with left hip pain; found to have femoral neck fracture.     #Mechanical fall with resultant Mildly impacted subcapital left femoral neck fracture  - Trauma work up in ED included CT C/A/P, CT cervical spine, CT head, CT L Hip and XR pelvis with L hip.   - Findings notable for mildly impacted subcapital left femoral neck fracture, small-moderate deep subcutaneous hematoma posterolateral left hip compatible with soft tissue contusion.   - S/p closed reduction, percutaneous pinning hip on 5/20  - Continue PT/OT   -OOB daily  -Ok for incision open to air.  -Upon discharge. Follow up with Ortho at 6 wks      #DVT prophylaxis  -  mg po daily     #Acute postop left hip pain  - Tylenol, oxycodone reduced to q8 PRN      #Hypernatremia (resolved)  - Na was 155 at admission in setting of poor PO and fall.   - Na normalized with IV fluids         #JAYLEN (improving)  - Creatinine at admission was noted to be 1.02.  Now back to normal   - Continue holding PTA Losartan     Multiple looser stool  - monitor, not on laxatives, if continues check c diff     Mixed Alzheimer's dementia and vascular dementia   - Delirium precautions  - PT, OT and social work following  - No longer on Aricept     #HTN  - Held losartan due to mild elevation of creatinine.  - now on  amlodipine 2.5 mg p.o. daily  - blood pressure  Up, started back losartan at lower doses 6/8   - Continue to closely monitor blood pressure and evaluate as necessary     #HLD  -  "Continue PTA statin     #Lung nodules  - Few sub 6mm pulmonary nodules noted incidentally on CT C/A/P at admission.  - Will need repeat Chest CT in 12 months     #Fluid attenuation anterior mediastinum  - Noted incidentally on CT C/A/P at admission. 2.5x2.7 cm in anterior mediastinum   - Consider MR Chest for better characterization once able to reliably remain still   - reviewed previous CT reading, CT form 6/22/2021 describes  \" 2.5 cm low-attenuation nodule anterior mediastinum most compatible with a benign cyst.       #Moderate Malnutrition  - Based on nutrition assessment    - Nutritional supplement being provided              Diet: Regular Diet Adult  Diet    DVT Prophylaxis: aspirin 162 mg   Dooley Catheter: Not present  Lines: None     Cardiac Monitoring: None  Code Status: No CPR- Do NOT Intubate      Clinically Significant Risk Factors           # Hypercalcemia: corrected calcium is >10.1, will monitor as appropriate    # Hypoalbuminemia: Lowest albumin = 3.2 g/dL at 6/8/2023  6:41 AM, will monitor as appropriate             # Moderate Malnutrition: based on nutrition assessment         Disposition Plan      TBD        Janet Bright MD  Hospitalist Service, GOLD TEAM [address] [place] [address]  Securely message with [employer] (more info)  Text page via [employer] Paging/Directory   See signed in provider for up to date coverage information  ______________________________________________________________________    Interval History    No new concerns, no diarrhea , just woke up   Physical Exam   Vital Signs: Temp: (!) 96.2  F (35.7  C) Temp src: Oral BP: 130/63 Pulse: 99   Resp: 16 SpO2: 95 % O2 Device: None (Room air)    Weight: 135 lbs 0 oz  General appearance: awake alert in no apparent distress     HEENT: EOMI and PEARLA, sclera nonicteric, moist mucus membranes, head atraumatic  NECK: supple  RESPIRATORY: lungs are clear   CARDIOVASCULAR: normal S1S2 regular rate and " rhythm  GASTROINTESTINAL: abdomen is, soft,  non-distended, non-tender with normal bowel sounds.  NEUROLOGIC: awake alert, speech clear         Data     I have personally reviewed the following data over the past 24 hrs:    N/A  \   N/A   / N/A     N/A N/A N/A /  N/A   N/A N/A N/A \       ALT: N/A AST: N/A AP: N/A TBILI: N/A   ALB: N/A TOT PROTEIN: N/A LIPASE: N/A       Imaging results reviewed over the past 24 hrs:   No results found for this or any previous visit (from the past 24 hour(s)).  Recent Labs   Lab 06/08/23  0641 06/05/23  1259 06/05/23  0528   WBC 7.7  --  8.7   HGB 11.3*  --  11.0*   MCV 87  --  87     --  283     --  136   POTASSIUM 4.3  --  4.3   CHLORIDE 103  --  104   CO2 26  --  24   BUN 30.2*  --  28.7*   CR 0.87  --  0.89   ANIONGAP 8  --  8   SUZY 9.6  --  9.7   GLC 96 97 92   ALBUMIN 3.2*  --  3.2*   PROTTOTAL 5.3*  --  5.2*   BILITOTAL 0.2  --  0.2   ALKPHOS 93  --  87   ALT 24  --  31   AST 18  --  23

## 2023-06-09 NOTE — PLAN OF CARE
"VS: /63 (BP Location: Left arm)   Pulse 99   Temp (!) 96.2  F (35.7  C) (Oral)   Resp 16   Ht 1.626 m (5' 4\")   Wt 61.2 kg (135 lb)   LMP  (LMP Unknown)   SpO2 95%   BMI 23.17 kg/m      O2: O2 SATS >90% denies chest pain,  or SBO   Output: Voids adequately adequate amount without issues pure wick in place    Last BM: 6/8/2023 incontinent of bowel and bladder BS present all x4 quadrants    Activity: Up ad gabbie, Assist 2 with walker and gait belt    Up for meals? N/A   Skin: L hip surgical incision   Pain: Managed with prn oxy      CMS: AOX2 with some confusion  Denies numbness and tingling   Dressing: None   Diet: Regular diet    LDA: No IV access   Equipment: IV Pole Gait Belt personal belongings   Plan: Continue to monitor  and update,    Additional Info:                              "

## 2023-06-09 NOTE — PROGRESS NOTES
CLINICAL NUTRITION SERVICES - REASSESSMENT NOTE     Nutrition Prescription    RECOMMENDATIONS FOR MDs/PROVIDERS TO ORDER:  None    Malnutrition Status:    Moderate malnutrition in the context of acute vs chronic illness    Recommendations already ordered by Registered Dietitian (RD):  Ensure Enlive daily    Future/Additional Recommendations:  Monitor labs, intakes, and weight trends.     EVALUATION OF THE PROGRESS TOWARD GOALS   Diet: Regular  Intake/[place]: mostly 100% of documented meals. Two 75% and one 50% over last wekk  Snacks/supplements: None    Pt ordering (on average) 1837 kcal and 72 g protein per day per [employer]. With documented intakes, pt is likely meeting >90% minimum energy and >85% protein needs.     NEW FINDINGS/REVIEW OF SYSTEMS    Nutrition/GI: Pt with 4 loose stools daily on 6/6-6/7. 2 loose stools yesterday. Imodium ordered PRN. RD met with pt at bedside. Pt sleeping at time of visit but able to wake up for writer. Pt reports eating okay and endorses no discomfort of pain however pt seems confused and fell back asleep while writer got water for pt.     Weights: Pt with limited weight history prior to admission, with 13 lb (9%) weight loss over 2 months if current weight accurate however at similar weight to 1 year prior   Wt Readings from Last Encounters:   05/20/23 61.2 kg (135 lb)   03/12/23 67.1 kg (147 lb 14.9 oz)     12/29/22 06/08/22 64.3 kg (141 lb 12.1 oz) - Care everywhere   59 kg (130 lb) - Care everywhere        Skin: surgical incision      Labs reviewed   05/27/23 07:24 05/29/23 05:01 06/05/23 05:28 06/08/23 06:41   Sodium 135 (L) 134 (L) 136 137   Potassium 4.7 4.6 4.3 4.3   Urea Nitrogen 40.2 (H) 38.1 (H) 28.7 (H) 30.2 (H)   Creatinine 1.20 (H) 1.00 (H) 0.89 0.87      Medications reviewed: imodium PRN    MALNUTRITION  % Intake: Unable to assess  % Weight Loss: Difficult to assess  Subcutaneous Fat Loss: Facial region:  Mild-moderate  Muscle Loss: Temporal: Mild-moderate,  Facial & jaw region:  Moderate and Thoracic region (clavicle, acromium bone, deltoid, trapezius, pectoral):  Moderate-severe  Fluid Accumulation/Edema: Does not meet criteria  Malnutrition Diagnosis: Moderate malnutrition in the context of acute vs chronic illness    Previous Goals   Patient to consume % of nutritionally adequate meal trays TID, or the equivalent with supplements/snacks.  Evaluation: Unable to evaluate    Previous Nutrition Diagnosis  Predicted inadequate nutrient intake related to varied appetite and LOS as evidenced by potential for menu fatigue with prolonged hospitalization and pt reliant on PO intakes to meet 100% needs with potential for variation.   Evaluation: No change    CURRENT NUTRITION DIAGNOSIS  Predicted inadequate nutrient intake related to varied appetite, varied mental status and LOS as evidenced by potential for menu fatigue with prolonged hospitalization and pt reliant on PO intakes to meet 100% needs with potential for variation.     INTERVENTIONS  Implementation  Medical food supplement therapy - Ensure daily    Goals  Patient to consume % of nutritionally adequate meal trays TID, or the equivalent with supplements/snacks.    Monitoring/Evaluation  Progress toward goals will be monitored and evaluated per protocol.    Sveta Robert MS, RDN, LDN  RD pager: [phone]  [facility] Weekend/Holiday Pager: [phone]

## 2023-06-10 PROCEDURE — 250N000013 HC RX MED GY IP 250 OP 250 PS 637: Performed by: STUDENT IN AN ORGANIZED HEALTH CARE EDUCATION/TRAINING PROGRAM

## 2023-06-10 PROCEDURE — 250N000013 HC RX MED GY IP 250 OP 250 PS 637: Performed by: INTERNAL MEDICINE

## 2023-06-10 PROCEDURE — 120N000002 HC R&B MED SURG/OB UMMC

## 2023-06-10 PROCEDURE — 99232 SBSQ HOSP IP/OBS MODERATE 35: CPT | Performed by: INTERNAL MEDICINE

## 2023-06-10 PROCEDURE — 99207 PR CDG-CUT & PASTE-POTENTIAL IMPACT ON LEVEL: CPT | Performed by: INTERNAL MEDICINE

## 2023-06-10 RX ADMIN — AMLODIPINE BESYLATE 2.5 MG: 2.5 TABLET ORAL at 09:00

## 2023-06-10 RX ADMIN — ACETAMINOPHEN 650 MG: 325 TABLET, FILM COATED ORAL at 09:00

## 2023-06-10 RX ADMIN — SIMVASTATIN 40 MG: 40 TABLET, FILM COATED ORAL at 21:01

## 2023-06-10 RX ADMIN — LOSARTAN POTASSIUM 25 MG: 25 TABLET, FILM COATED ORAL at 09:00

## 2023-06-10 RX ADMIN — ASPIRIN 81 MG CHEWABLE TABLET 162 MG: 81 TABLET CHEWABLE at 08:59

## 2023-06-10 ASSESSMENT — ACTIVITIES OF DAILY LIVING (ADL)
ADLS_ACUITY_SCORE: 44
ADLS_ACUITY_SCORE: 46
ADLS_ACUITY_SCORE: 44
ADLS_ACUITY_SCORE: 46
ADLS_ACUITY_SCORE: 44
ADLS_ACUITY_SCORE: 46
ADLS_ACUITY_SCORE: 44
ADLS_ACUITY_SCORE: 44

## 2023-06-10 NOTE — PLAN OF CARE
VS: VSS, pt denied CP or SOB.    O2: Room air sat. > 90%.    Output: Incontinent voiding adequate amount.    Last BM: 06/10/23 passing gas.    Activity: Repositioned in bed, up to chair this evening for supper.    Skin: Intact.   Pain: Denied pain or discomfort.    Neuro: CMS and neuro intact to baseline.    Dressing: None.    Diet: Regular tolerating okay.    LDA: None.    Equipment: Walker, and personal belongings.    Plan: TBD.    Additional Info: On bed alarm and chair alarm for safety.

## 2023-06-10 NOTE — PROGRESS NOTES
[facility]    Medicine Progress Note - Hospitalist Service, GOLD TEAM 16    Date of Admission:  5/20/2023    Assessment & Plan      83 year old female patient with a past medical history significant for HTN, HLD, overactive bladder, GERD, basal cell carcinoma, osteoporosis, frequent falls, recurrent UTIs, cervical spondylosis, Alzheimer's dementia who presented from memory care assisted living to [facility] ED after a mechanical fall at home with left hip pain; found to have femoral neck fracture.     #Mechanical fall with resultant Mildly impacted subcapital left femoral neck fracture  - Trauma work up in ED included CT C/A/P, CT cervical spine, CT head, CT L Hip and XR pelvis with L hip.   - Findings notable for mildly impacted subcapital left femoral neck fracture, small-moderate deep subcutaneous hematoma posterolateral left hip compatible with soft tissue contusion.   - S/p closed reduction, percutaneous pinning hip on 5/20  - Continue PT/OT   -OOB daily  -Ok for incision open to air.  -Upon discharge. Follow up with Ortho at 6 wks      #DVT prophylaxis  -  mg po daily     #Acute postop left hip pain  - Tylenol, oxycodone reduced to q8 PRN      #Hypernatremia (resolved)  - Na was 155 at admission in setting of poor PO and fall.   - Na normalized with IV fluids         #JAYLEN (improving)  - Creatinine at admission was noted to be 1.02.  Now back to normal   - Continue holding PTA Losartan     Multiple looser stool  - monitor, not on laxatives, did get imodium over night 6/10, hold imodium and if continues check c diff     Mixed Alzheimer's dementia and vascular dementia   - Delirium precautions  - PT, OT and social work following  - No longer on Aricept     #HTN  - Held losartan due to mild elevation of creatinine.  - now on  amlodipine 2.5 mg p.o. daily  - blood pressure  Up, started back losartan at lower doses 6/8   - Continue to closely monitor blood  "pressure and evaluate as necessary     #HLD  - Continue PTA statin     #Lung nodules  - Few sub 6mm pulmonary nodules noted incidentally on CT C/A/P at admission.  - Will need repeat Chest CT in 12 months     #Fluid attenuation anterior mediastinum  - Noted incidentally on CT C/A/P at admission. 2.5x2.7 cm in anterior mediastinum   - Consider MR Chest for better characterization once able to reliably remain still   - reviewed previous CT reading, CT form 6/22/2021 describes  \" 2.5 cm low-attenuation nodule anterior mediastinum most compatible with a benign cyst.       #Moderate Malnutrition  - Based on nutrition assessment    - Nutritional supplement being provided              Diet: Regular Diet Adult  Diet    DVT Prophylaxis: aspirin 162 mg   Dooley Catheter: Not present  Lines: None     Cardiac Monitoring: None  Code Status: No CPR- Do NOT Intubate      Clinically Significant Risk Factors              # Hypoalbuminemia: Lowest albumin = 3.2 g/dL at 6/8/2023  6:41 AM, will monitor as appropriate             # Moderate Malnutrition: based on nutrition assessment         Disposition Plan      TBD        Janet Bright MD  Hospitalist Service, GOLD TEAM [address] [place] [address]  Securely message with [employer] (more info)  Text page via [facility] Paging/Directory   See signed in provider for up to date coverage information  ______________________________________________________________________    Interval History    In bed, denies any chest pain  No shortness of breath  No nausea vomiting, had some diarrhea over night   Physical Exam   Vital Signs: Temp: (!) 96.2  F (35.7  C) Temp src: Oral BP: 132/70 Pulse: 89   Resp: 17 SpO2: 97 % O2 Device: None (Room air)    Weight: 135 lbs 0 oz  General appearance: awake alert in no apparent distress     HEENT: EOMI and PEARLA, sclera nonicteric, moist mucus membranes, head atraumatic  NECK: supple  RESPIRATORY: lungs are clear   CARDIOVASCULAR: " normal S1S2 regular rate and rhythm  GASTROINTESTINAL: abdomen is, soft,  non-distended, non-tender with normal bowel sounds.  NEUROLOGIC: awake alert, speech clear         Data         Imaging results reviewed over the past 24 hrs:   No results found for this or any previous visit (from the past 24 hour(s)).  Recent Labs   Lab 06/08/23  0641 06/05/23  1259 06/05/23  0528   WBC 7.7  --  8.7   HGB 11.3*  --  11.0*   MCV 87  --  87     --  283     --  136   POTASSIUM 4.3  --  4.3   CHLORIDE 103  --  104   CO2 26  --  24   BUN 30.2*  --  28.7*   CR 0.87  --  0.89   ANIONGAP 8  --  8   SUZY 9.6  --  9.7   GLC 96 97 92   ALBUMIN 3.2*  --  3.2*   PROTTOTAL 5.3*  --  5.2*   BILITOTAL 0.2  --  0.2   ALKPHOS 93  --  87   ALT 24  --  31   AST 18  --  23

## 2023-06-10 NOTE — PLAN OF CARE
VS: VSS, pt denied CP or SOB.    O2: Room air sat. > 90%.    Output: Incontinent voiding adequate amount.    Last BM: 06/09/23 passing gas.    Activity: Up to chair and up walked with PT.    Skin: Intact.   Pain: Denied pain or discomfort.    Neuro: CMS and neuro intact to baseline.    Dressing: None.    Diet: Regular tolerating okay.    LDA: None.    Equipment: Walker, and personal belongings.    Plan: TBD.    Additional Info: On bed alarm and chair alarm when up for safety.

## 2023-06-10 NOTE — PLAN OF CARE
"1900-0730  /70 (BP Location: Left arm)   Pulse 89   Temp (!) 96.2  F (35.7  C) (Oral)   Resp 17   Ht 1.626 m (5' 4\")   Wt 61.2 kg (135 lb)   LMP  (LMP Unknown)   SpO2 97%   BMI 23.17 kg/m       AOx1-2 hx dementia, cooperative. ORA. Denies CP, SOB, N/V, N/T. Reports minimal pain to back this shift, PRN Tylenol given. L hip MADHU.  Skin intact ex bruising to L hip, scab to R shin, and blanchable redness to bottom- barrier cream applied.      A1-2 with turns, ambulating using FWW GB.     Incont B/B. LBM 6/10, loose watery stools throughout day, PRN Imodium given x1.      Reg diet, tolerating. Encourage PO fluid intake with interactions. Needs assistance ordering meals. Takes pills one at a time.      No IV access     Delirium and fall prec. Slept between cares. Pt able to use call light at times, however needs are anticipated. Frequent rounding. Bed and chair alarms utilized. Care ongoing.     Plan: Awaiting placement, possible discharge to  next week. SW following.  "

## 2023-06-11 PROCEDURE — 250N000013 HC RX MED GY IP 250 OP 250 PS 637: Performed by: STUDENT IN AN ORGANIZED HEALTH CARE EDUCATION/TRAINING PROGRAM

## 2023-06-11 PROCEDURE — 120N000002 HC R&B MED SURG/OB UMMC

## 2023-06-11 PROCEDURE — 250N000013 HC RX MED GY IP 250 OP 250 PS 637: Performed by: INTERNAL MEDICINE

## 2023-06-11 PROCEDURE — 99207 PR CDG-CUT & PASTE-POTENTIAL IMPACT ON LEVEL: CPT | Performed by: INTERNAL MEDICINE

## 2023-06-11 PROCEDURE — 99232 SBSQ HOSP IP/OBS MODERATE 35: CPT | Performed by: INTERNAL MEDICINE

## 2023-06-11 RX ADMIN — LOSARTAN POTASSIUM 25 MG: 25 TABLET, FILM COATED ORAL at 09:17

## 2023-06-11 RX ADMIN — SIMVASTATIN 40 MG: 40 TABLET, FILM COATED ORAL at 20:58

## 2023-06-11 RX ADMIN — ACETAMINOPHEN 650 MG: 325 TABLET, FILM COATED ORAL at 09:18

## 2023-06-11 RX ADMIN — ASPIRIN 81 MG CHEWABLE TABLET 162 MG: 81 TABLET CHEWABLE at 09:17

## 2023-06-11 RX ADMIN — AMLODIPINE BESYLATE 2.5 MG: 2.5 TABLET ORAL at 09:17

## 2023-06-11 ASSESSMENT — ACTIVITIES OF DAILY LIVING (ADL)
ADLS_ACUITY_SCORE: 44
ADLS_ACUITY_SCORE: 41
ADLS_ACUITY_SCORE: 44
ADLS_ACUITY_SCORE: 43
ADLS_ACUITY_SCORE: 44

## 2023-06-11 NOTE — PLAN OF CARE
VS: VSS, pt denied CP or SOB.    O2: Room air sat. > 90%.    Output: Incontinent voiding adequate amount.    Last BM: 06/10/23 passing gas.    Activity: Repositioned in bed, up to chair most of the morning.    Skin: Intact.   Pain: Denied pain or discomfort.    Neuro: CMS and neuro intact to baseline.    Dressing: None.    Diet: Regular tolerating okay.    LDA: None.    Equipment: Walker, and personal belongings.    Plan: TBD.    Additional Info: On bed alarm and chair alarm for safety.

## 2023-06-11 NOTE — PROGRESS NOTES
[facility]    Medicine Progress Note - Hospitalist Service, GOLD TEAM 16    Date of Admission:  5/20/2023    Assessment & Plan      83 year old female patient with a past medical history significant for HTN, HLD, overactive bladder, GERD, basal cell carcinoma, osteoporosis, frequent falls, recurrent UTIs, cervical spondylosis, Alzheimer's dementia who presented from memory care assisted living to [facility] ED after a mechanical fall at home with left hip pain; found to have femoral neck fracture.     #Mechanical fall with resultant Mildly impacted subcapital left femoral neck fracture  - Trauma work up in ED included CT C/A/P, CT cervical spine, CT head, CT L Hip and XR pelvis with L hip.   - Findings notable for mildly impacted subcapital left femoral neck fracture, small-moderate deep subcutaneous hematoma posterolateral left hip compatible with soft tissue contusion.   - S/p closed reduction, percutaneous pinning hip on 5/20  - Continue PT/OT   -OOB daily  -Ok for incision open to air.  -Upon discharge. Follow up with Ortho at 6 wks      #DVT prophylaxis  -  mg po daily     #Acute postop left hip pain  - Tylenol, oxycodone reduced to q8 PRN      #Hypernatremia (resolved)  - Na was 155 at admission in setting of poor PO and fall.   - Na normalized with IV fluids         #JAYLEN (improving)  - Creatinine at admission was noted to be 1.02.  Now back to normal   - Continue holding PTA Losartan     Multiple looser stool  - monitor, not on laxatives, did get imodium over night 6/10, hold imodium and if continues check c diff     Mixed Alzheimer's dementia and vascular dementia   - Delirium precautions  - PT, OT and social work following  - No longer on Aricept     #HTN  - Held losartan due to mild elevation of creatinine.  - now on  amlodipine 2.5 mg p.o. daily  - blood pressure  Up, started back losartan at lower doses 6/8   - Continue to closely monitor blood  "pressure and evaluate as necessary     #HLD  - Continue PTA statin     #Lung nodules  - Few sub 6mm pulmonary nodules noted incidentally on CT C/A/P at admission.  - Will need repeat Chest CT in 12 months     #Fluid attenuation anterior mediastinum  - Noted incidentally on CT C/A/P at admission. 2.5x2.7 cm in anterior mediastinum   - Consider MR Chest for better characterization once able to reliably remain still   - reviewed previous CT reading, CT form 6/22/2021 describes  \" 2.5 cm low-attenuation nodule anterior mediastinum most compatible with a benign cyst.       #Moderate Malnutrition  - Based on nutrition assessment    - Nutritional supplement being provided              Diet: Regular Diet Adult  Diet    DVT Prophylaxis: aspirin 162 mg   Dooley Catheter: Not present  Lines: None     Cardiac Monitoring: None  Code Status: No CPR- Do NOT Intubate      Clinically Significant Risk Factors              # Hypoalbuminemia: Lowest albumin = 3.2 g/dL at 6/8/2023  6:41 AM, will monitor as appropriate             # Moderate Malnutrition: based on nutrition assessment         Disposition Plan      TBD        Janet Bright MD  Hospitalist Service, GOLD TEAM [address] [place] [address]  Securely message with [employer] (more info)  Text page via [facility] Paging/Directory   See signed in provider for up to date coverage information  ______________________________________________________________________    Interval History    Sitting up in chair, doing ok, no pain   Physical Exam   Vital Signs: Temp: 97.7  F (36.5  C) Temp src: Oral BP: 139/69 Pulse: 96   Resp: 24 SpO2: 92 % O2 Device: None (Room air)    Weight: 135 lbs 0 oz  General appearance: awake alert in no apparent distress     HEENT: EOMI and PEARLA, sclera nonicteric, moist mucus membranes, head atraumatic  NECK: supple  RESPIRATORY: lungs are clear   CARDIOVASCULAR: normal S1S2 regular rate and rhythm  GASTROINTESTINAL: abdomen is, soft, "  non-distended, non-tender with normal bowel sounds.  NEUROLOGIC: awake alert, speech clear         Data         Imaging results reviewed over the past 24 hrs:   No results found for this or any previous visit (from the past 24 hour(s)).  Recent Labs   Lab 06/08/23  0641 06/05/23  1259 06/05/23  0528   WBC 7.7  --  8.7   HGB 11.3*  --  11.0*   MCV 87  --  87     --  283     --  136   POTASSIUM 4.3  --  4.3   CHLORIDE 103  --  104   CO2 26  --  24   BUN 30.2*  --  28.7*   CR 0.87  --  0.89   ANIONGAP 8  --  8   SUZY 9.6  --  9.7   GLC 96 97 92   ALBUMIN 3.2*  --  3.2*   PROTTOTAL 5.3*  --  5.2*   BILITOTAL 0.2  --  0.2   ALKPHOS 93  --  87   ALT 24  --  31   AST 18  --  23

## 2023-06-11 NOTE — PLAN OF CARE
Pt A&O to self but disoriented to time, place and situations.  VSS. Afebrile. 02 sats in the 90s on RA. Lungs clear. Denies SOB, CP and nausea. Tolerating regular diet. Bowel sound active in all quadrants. No BM but passing gas. Voiding adequate amount into the pad. Denies pain when asked. CMS intact, denies N/T. No PIV.Pt slept.between care and is uses the call light at times. Bed alarm is on. Will continue to monitor.

## 2023-06-12 ENCOUNTER — APPOINTMENT (OUTPATIENT)
Dept: PHYSICAL THERAPY | Facility: CLINIC | Age: 83
DRG: 481 | End: 2023-06-12
Payer: COMMERCIAL

## 2023-06-12 LAB
ALBUMIN SERPL BCG-MCNC: 3.1 G/DL (ref 3.5–5.2)
ALP SERPL-CCNC: 91 U/L (ref 35–104)
ALT SERPL W P-5'-P-CCNC: 24 U/L (ref 10–35)
ANION GAP SERPL CALCULATED.3IONS-SCNC: 8 MMOL/L (ref 7–15)
AST SERPL W P-5'-P-CCNC: 20 U/L (ref 10–35)
BASOPHILS # BLD AUTO: 0 10E3/UL (ref 0–0.2)
BASOPHILS NFR BLD AUTO: 0 %
BILIRUB SERPL-MCNC: 0.3 MG/DL
BUN SERPL-MCNC: 31.5 MG/DL (ref 8–23)
CALCIUM SERPL-MCNC: 9.4 MG/DL (ref 8.8–10.2)
CHLORIDE SERPL-SCNC: 103 MMOL/L (ref 98–107)
CREAT SERPL-MCNC: 0.96 MG/DL (ref 0.51–0.95)
DEPRECATED HCO3 PLAS-SCNC: 24 MMOL/L (ref 22–29)
EOSINOPHIL # BLD AUTO: 0.2 10E3/UL (ref 0–0.7)
EOSINOPHIL NFR BLD AUTO: 3 %
ERYTHROCYTE [DISTWIDTH] IN BLOOD BY AUTOMATED COUNT: 13.4 % (ref 10–15)
GFR SERPL CREATININE-BSD FRML MDRD: 59 ML/MIN/1.73M2
GLUCOSE SERPL-MCNC: 92 MG/DL (ref 70–99)
HCT VFR BLD AUTO: 33.1 % (ref 35–47)
HGB BLD-MCNC: 10.9 G/DL (ref 11.7–15.7)
IMM GRANULOCYTES # BLD: 0 10E3/UL
IMM GRANULOCYTES NFR BLD: 0 %
LYMPHOCYTES # BLD AUTO: 1.3 10E3/UL (ref 0.8–5.3)
LYMPHOCYTES NFR BLD AUTO: 17 %
MCH RBC QN AUTO: 28.8 PG (ref 26.5–33)
MCHC RBC AUTO-ENTMCNC: 32.9 G/DL (ref 31.5–36.5)
MCV RBC AUTO: 88 FL (ref 78–100)
MONOCYTES # BLD AUTO: 0.9 10E3/UL (ref 0–1.3)
MONOCYTES NFR BLD AUTO: 12 %
NEUTROPHILS # BLD AUTO: 5.1 10E3/UL (ref 1.6–8.3)
NEUTROPHILS NFR BLD AUTO: 68 %
NRBC # BLD AUTO: 0 10E3/UL
NRBC BLD AUTO-RTO: 0 /100
PLATELET # BLD AUTO: 248 10E3/UL (ref 150–450)
POTASSIUM SERPL-SCNC: 4.3 MMOL/L (ref 3.4–5.3)
PROT SERPL-MCNC: 5.3 G/DL (ref 6.4–8.3)
RBC # BLD AUTO: 3.78 10E6/UL (ref 3.8–5.2)
SODIUM SERPL-SCNC: 135 MMOL/L (ref 136–145)
WBC # BLD AUTO: 7.5 10E3/UL (ref 4–11)

## 2023-06-12 PROCEDURE — 250N000013 HC RX MED GY IP 250 OP 250 PS 637: Performed by: INTERNAL MEDICINE

## 2023-06-12 PROCEDURE — 36415 COLL VENOUS BLD VENIPUNCTURE: CPT | Performed by: INTERNAL MEDICINE

## 2023-06-12 PROCEDURE — 250N000013 HC RX MED GY IP 250 OP 250 PS 637: Performed by: STUDENT IN AN ORGANIZED HEALTH CARE EDUCATION/TRAINING PROGRAM

## 2023-06-12 PROCEDURE — 97116 GAIT TRAINING THERAPY: CPT | Mod: GP | Performed by: REHABILITATION PRACTITIONER

## 2023-06-12 PROCEDURE — 85025 COMPLETE CBC W/AUTO DIFF WBC: CPT | Performed by: INTERNAL MEDICINE

## 2023-06-12 PROCEDURE — 99232 SBSQ HOSP IP/OBS MODERATE 35: CPT | Performed by: INTERNAL MEDICINE

## 2023-06-12 PROCEDURE — 97530 THERAPEUTIC ACTIVITIES: CPT | Mod: GP | Performed by: REHABILITATION PRACTITIONER

## 2023-06-12 PROCEDURE — 99207 PR CDG-CUT & PASTE-POTENTIAL IMPACT ON LEVEL: CPT | Performed by: INTERNAL MEDICINE

## 2023-06-12 PROCEDURE — 80053 COMPREHEN METABOLIC PANEL: CPT | Performed by: INTERNAL MEDICINE

## 2023-06-12 PROCEDURE — 120N000002 HC R&B MED SURG/OB UMMC

## 2023-06-12 RX ORDER — LOSARTAN POTASSIUM 50 MG/1
50 TABLET ORAL DAILY
Qty: 30 TABLET | Refills: 0 | Status: SHIPPED | OUTPATIENT
Start: 2023-06-12

## 2023-06-12 RX ORDER — MULTIPLE VITAMINS W/ MINERALS TAB 9MG-400MCG
1 TAB ORAL DAILY
Qty: 30 TABLET | Refills: 0 | Status: SHIPPED | OUTPATIENT
Start: 2023-06-12

## 2023-06-12 RX ORDER — ASPIRIN 81 MG/1
162 TABLET, CHEWABLE ORAL DAILY
Qty: 28 TABLET | Refills: 0 | Status: SHIPPED | OUTPATIENT
Start: 2023-06-12

## 2023-06-12 RX ORDER — LOSARTAN POTASSIUM 50 MG/1
50 TABLET ORAL DAILY
Status: DISCONTINUED | OUTPATIENT
Start: 2023-06-13 | End: 2023-06-13 | Stop reason: HOSPADM

## 2023-06-12 RX ORDER — ACETAMINOPHEN 325 MG/1
650 TABLET ORAL EVERY 6 HOURS PRN
Qty: 60 TABLET | Refills: 0 | Status: ON HOLD | OUTPATIENT
Start: 2023-06-12 | End: 2023-10-06

## 2023-06-12 RX ORDER — SIMVASTATIN 40 MG
40 TABLET ORAL DAILY
Qty: 14 TABLET | Refills: 0 | Status: SHIPPED | OUTPATIENT
Start: 2023-06-12 | End: 2023-06-13

## 2023-06-12 RX ADMIN — SIMVASTATIN 40 MG: 40 TABLET, FILM COATED ORAL at 20:29

## 2023-06-12 RX ADMIN — AMLODIPINE BESYLATE 2.5 MG: 2.5 TABLET ORAL at 08:23

## 2023-06-12 RX ADMIN — ASPIRIN 81 MG CHEWABLE TABLET 162 MG: 81 TABLET CHEWABLE at 08:23

## 2023-06-12 RX ADMIN — LOSARTAN POTASSIUM 25 MG: 25 TABLET, FILM COATED ORAL at 08:23

## 2023-06-12 ASSESSMENT — ACTIVITIES OF DAILY LIVING (ADL)
ADLS_ACUITY_SCORE: 46
ADLS_ACUITY_SCORE: 41
ADLS_ACUITY_SCORE: 46
ADLS_ACUITY_SCORE: 46
ADLS_ACUITY_SCORE: 41
ADLS_ACUITY_SCORE: 41
ADLS_ACUITY_SCORE: 46
ADLS_ACUITY_SCORE: 46
ADLS_ACUITY_SCORE: 41
ADLS_ACUITY_SCORE: 41
ADLS_ACUITY_SCORE: 46
ADLS_ACUITY_SCORE: 46

## 2023-06-12 NOTE — PROGRESS NOTES
[facility]    Medicine Progress Note - Hospitalist Service, GOLD TEAM 16    Date of Admission:  5/20/2023    Assessment & Plan      83 year old female patient with a past medical history significant for HTN, HLD, overactive bladder, GERD, basal cell carcinoma, osteoporosis, frequent falls, recurrent UTIs, cervical spondylosis, Alzheimer's dementia who presented from memory care assisted living to [facility] ED after a mechanical fall at home with left hip pain; found to have femoral neck fracture.     #Mechanical fall with resultant Mildly impacted subcapital left femoral neck fracture  - Trauma work up in ED included CT C/A/P, CT cervical spine, CT head, CT L Hip and XR pelvis with L hip.   - Findings notable for mildly impacted subcapital left femoral neck fracture, small-moderate deep subcutaneous hematoma posterolateral left hip compatible with soft tissue contusion.   - S/p closed reduction, percutaneous pinning hip on 5/20  - Continue PT/OT   -OOB daily  -Ok for incision open to air.  -Upon discharge. Follow up with Ortho at 6 wks now prob 4 weeks       #DVT prophylaxis  -  mg po daily     #Acute postop left hip pain  - Tylenol, oxycodone reduced to q8 PRN       # ongoing diarrhea  - not on laxatives, had periop antibiotics, checking c diff      #Hypernatremia (resolved)  - Na was 155 at admission in setting of poor PO and fall.   - Na normalized with IV fluids         #JAYLEN (improving)  - Creatinine at admission was noted to be 1.02.  Now back to normal   - Continue holding PTA Losartan     Multiple looser stool  - monitor, not on laxatives, did get imodium over night 6/10, hold imodium and if continues check c diff     Mixed Alzheimer's dementia and vascular dementia   - Delirium precautions  - PT, OT and social work following  - No longer on Aricept     #HTN  - Held losartan due to mild elevation of creatinine.  - now on  amlodipine 2.5 mg p.o.  "daily  - blood pressure  Up, started back losartan at lower doses 6/8   - Continue to closely monitor blood pressure and evaluate as necessary     #HLD  - Continue PTA statin     #Lung nodules  - Few sub 6mm pulmonary nodules noted incidentally on CT C/A/P at admission.  - Will need repeat Chest CT in 12 months     #Fluid attenuation anterior mediastinum  - Noted incidentally on CT C/A/P at admission. 2.5x2.7 cm in anterior mediastinum   - Consider MR Chest for better characterization once able to reliably remain still   - reviewed previous CT reading, CT form 6/22/2021 describes  \" 2.5 cm low-attenuation nodule anterior mediastinum most compatible with a benign cyst.       #Moderate Malnutrition  - Based on nutrition assessment    - Nutritional supplement being provided              Diet: Regular Diet Adult  Diet  Room Service    DVT Prophylaxis: aspirin 162 mg   Dooley Catheter: Not present  Lines: None     Cardiac Monitoring: None  Code Status: No CPR- Do NOT Intubate      Clinically Significant Risk Factors           # Hypercalcemia: corrected calcium is >10.1, will monitor as appropriate    # Hypoalbuminemia: Lowest albumin = 3.1 g/dL at 6/12/2023  5:26 AM, will monitor as appropriate             # Moderate Malnutrition: based on nutrition assessment         Disposition Plan      Has TCU spot for 6/13 AM  Discharge  Time 10-1:50 , discharge  Orders completed , please adjust if changes made, follow up  On c diff        Janet Bright MD  Hospitalist Service, GOLD TEAM [address] [place] [address]  Securely message with [employer] (more info)  Text page via [employer] Paging/Directory   See signed in provider for up to date coverage information  ______________________________________________________________________    Interval History        Doing OK, has been having ongoing diarrhea, patient  Is not awarem denies nausea vomiting no abdominal pain   Physical Exam   Vital Signs: Temp: 97.4 "  F (36.3  C) Temp src: Oral BP: 130/68 Pulse: 84   Resp: 16 SpO2: 98 % O2 Device: None (Room air)    Weight: 135 lbs 0 oz  General appearance: awake alert in no apparent distress     HEENT: EOMI and PEARLA, sclera nonicteric, moist mucus membranes, head atraumatic  NECK: supple  RESPIRATORY: lungs are clear   CARDIOVASCULAR: normal S1S2 regular rate and rhythm  GASTROINTESTINAL: abdomen is, soft,  non-distended, non-tender with normal bowel sounds.  NEUROLOGIC: awake alert, speech clear         Data     I have personally reviewed the following data over the past 24 hrs:    7.5  \   10.9 (L)   / 248     135 (L) 103 31.5 (H) /  92   4.3 24 0.96 (H) \       ALT: 24 AST: 20 AP: 91 TBILI: 0.3   ALB: 3.1 (L) TOT PROTEIN: 5.3 (L) LIPASE: N/A       Imaging results reviewed over the past 24 hrs:   No results found for this or any previous visit (from the past 24 hour(s)).  Recent Labs   Lab 06/12/23  0526 06/08/23  0641 06/05/23  1259   WBC 7.5 7.7  --    HGB 10.9* 11.3*  --    MCV 88 87  --     267  --    * 137  --    POTASSIUM 4.3 4.3  --    CHLORIDE 103 103  --    CO2 24 26  --    BUN 31.5* 30.2*  --    CR 0.96* 0.87  --    ANIONGAP 8 8  --    SUZY 9.4 9.6  --    GLC 92 96 97   ALBUMIN 3.1* 3.2*  --    PROTTOTAL 5.3* 5.3*  --    BILITOTAL 0.3 0.2  --    ALKPHOS 91 93  --    ALT 24 24  --    AST 20 18  --

## 2023-06-12 NOTE — PLAN OF CARE
"Goal Outcome Evaluation:    DNR     VS: BP (!) 145/75 (BP Location: Right arm)   Pulse 96   Temp (!) 96.1  F (35.6  C) (Oral)   Resp 16   Ht 1.626 m (5' 4\")   Wt 61.2 kg (135 lb)   LMP  (LMP Unknown)   SpO2 96%   BMI 23.17 kg/m    Pt denied CP or SOB.    O2: Room air sat > 95%.    Output: Incontinent voiding adequate amount.    Last BM: 6/12/23 - loose stools  Passing gas.    Activity: In bed morning, PT and, up to chair most of the morning.   Currently- back in bed   Skin: Visible skin WNL   Pain: Denied pain or discomfort.    Neuro: CMS and neuro intact to baseline.   Pt has baseline confusion- alert to only self   Dressing: None.    Diet: Regular.    LDA: None.    Equipment: Walker, GB, and personal belongings.    Plan: TBD - waiting for placement   Additional Info: On bed alarm and chair alarm for safety.          "

## 2023-06-12 NOTE — PROGRESS NOTES
"Care Management Follow Up    Length of Stay (days): 23    Expected Discharge Date: 06/11/2023     Concerns to be Addressed: discharge planning     Patient plan of care discussed at interdisciplinary rounds: Yes    Anticipated Discharge Disposition: Assisted Living ()    [facility]  [address] [place] [address]  Phone: [phone]   Fax: [phone]  RN to RN Report: [phone]     Anticipated Discharge Services: All I/ADL assistance from  staff    [facility] (ph: [phone]). Scheduled w/c ride with  window between 1446-4113  Anticipated Discharge DME: None    Patient/family educated on Medicare website which has current facility and service quality ratings: no  Education Provided on the Discharge Plan: Yes  Patient/Family in Agreement with the Plan: yes    Referrals Placed by CM/SW:  (Home Healthcare; [facility]; [facility])  Private pay costs discussed: Transportation.    Additional Information:  Spoke with Lakshmi from [facility] who reported that they could admit pt tomorrow (6/13) morning. Lakshmi requested that pt arrive tomorrow morning, if possible, sometime around 1099-2695. Lakshmi requested that pt be sent with at least 2 weeks of medications if possible, but noted that they would make any medications work. [facility] will need discharge orders faxed to them tomorrow morning (F: [phone]). [facility] to call back with further information (I.e. transport time, etc.) once obtained.     HEIDE called pt's daughter (Alex, ph: [phone]) to discuss discharge plan and transportation. There was no answer but automated message identified as \"Alex Ybarra\". HEIDE left  explaining purpose of call and requesting call back when able.     ADDENDUM 1345: HEIDE received phone call from Krysta, an RN with [facility] requesting information such as pt ADLs, etc about pt. HEIDE took down Krysta's phone number ([phone]) for an RN to call her back.     HEIDE spoke with Alex about pt discharge plan. " Alex reported that pt would need transportation arranged as Alex will be at a conference in [place]. HEIDE discussed OOP costs for w/c transport but noted possibility of stretcher ride being needed. Alex reported that she thought a w/c ride would be fine and noted that pt did not move around without someone assisting her. Alex requested call back with ride time. Ok for SW to leave .     HEIDE scheduled w/c ride for pt through  [facility] (ph: [phone]) with  window of [phone]. SW to look into seeing if pt can tolerate w/c ride and call  [facility] back if stretcher is needed.     HEIDE spoke with PT who reported that they thought pt would be fine in with a w/c transport, noting that pt has been up walking with staff and sitting in her chair during the day. PT okayed w/c transport. Pt does need Ax1 with transfers.     HEIDE left  for Lakshmi at [facility] relaying information on ride time and asking if staff will be able to assist pt with transfer from w/c upon arrival. HEIDE requested call back.     ADDENDUM 1633: HEIDE spoke with Lakshmi who confirmed that they could assist pt with transferring from w/c upon arrival. Lakshmi confirmed they would plan for pt to admit tomorrow morning.     HEIDE spoke with Alex and relayed pt's discharge time. Alex had no further questions.     HEIDE paged provider updated discharge plan and requested pt be sent with 2 weeks of medications.     Pt plans to discharge to [facility] tomorrow (6/13). W/c transport set up through [employer] (ph: [phone]) with  window of [phone]. Discharge orders must be faxed to [facility] tomorrow (F: [phone]).     Beba Moffett, [facility]  Float   [facility]   5O SW Coverage  5O SW Ph: [phone]

## 2023-06-12 NOTE — PLAN OF CARE
Pt A&O to self but disoriented to time, place and situations.  VSS. Afebrile. 02 sats in the 90s on RA. Lungs clear. Denies SOB, CP and nausea. Tolerating regular diet. Bowel sound active in all quadrants. Pt had x2 loose stools. Pt is io Denies pain when asked. CMS intact, denies N/T. No PIV.Pt slept.between care and is uses the call light at times. Bed alarm is on. Will continue to monitor.

## 2023-06-13 ENCOUNTER — DOCUMENTATION ONLY (OUTPATIENT)
Dept: MEDSURG UNIT | Facility: CLINIC | Age: 83
End: 2023-06-13

## 2023-06-13 VITALS
BODY MASS INDEX: 23.05 KG/M2 | HEIGHT: 64 IN | TEMPERATURE: 97.7 F | SYSTOLIC BLOOD PRESSURE: 142 MMHG | OXYGEN SATURATION: 97 % | HEART RATE: 91 BPM | RESPIRATION RATE: 16 BRPM | WEIGHT: 135 LBS | DIASTOLIC BLOOD PRESSURE: 75 MMHG

## 2023-06-13 LAB — C DIFF TOX B STL QL: NEGATIVE

## 2023-06-13 PROCEDURE — 99239 HOSP IP/OBS DSCHRG MGMT >30: CPT | Performed by: INTERNAL MEDICINE

## 2023-06-13 PROCEDURE — 250N000013 HC RX MED GY IP 250 OP 250 PS 637: Performed by: INTERNAL MEDICINE

## 2023-06-13 PROCEDURE — 250N000013 HC RX MED GY IP 250 OP 250 PS 637: Performed by: STUDENT IN AN ORGANIZED HEALTH CARE EDUCATION/TRAINING PROGRAM

## 2023-06-13 PROCEDURE — 87493 C DIFF AMPLIFIED PROBE: CPT | Performed by: INTERNAL MEDICINE

## 2023-06-13 RX ORDER — SIMVASTATIN 40 MG
40 TABLET ORAL DAILY
Qty: 30 TABLET | Refills: 0 | Status: SHIPPED | OUTPATIENT
Start: 2023-06-13

## 2023-06-13 RX ADMIN — LOSARTAN POTASSIUM 50 MG: 50 TABLET, FILM COATED ORAL at 08:29

## 2023-06-13 RX ADMIN — AMLODIPINE BESYLATE 2.5 MG: 2.5 TABLET ORAL at 08:29

## 2023-06-13 RX ADMIN — ASPIRIN 81 MG CHEWABLE TABLET 162 MG: 81 TABLET CHEWABLE at 08:29

## 2023-06-13 ASSESSMENT — ACTIVITIES OF DAILY LIVING (ADL)
ADLS_ACUITY_SCORE: 47

## 2023-06-13 NOTE — PLAN OF CARE
Goal Outcome Evaluation:       Overall Patient Progress: no changeOverall Patient Progress: no change    Outcome Evaluation: loose stools      VS: Temp: (!) 96.5  F (35.8  C) Temp src: Oral BP: (!) 141/69 Pulse: 89   Resp: 16 SpO2: 98 % O2 Device: None (Room air)      O2: RA   Output: Incontinent   Last BM: 6/12, loose   Activity: Ax1-2   Skin: Incision L hip   Pain: Denies but does say ow with movement   Neuro: A&O x2-3   Dressing: MADHU   Diet: Regular with room service   LDA: No PIV access   Equipment: Walker   Plan: Discharge tomorrow to TCU between 10:10 and 10:50   Additional Info: Needs stool sample for C Diff, unable to collect this shift

## 2023-06-13 NOTE — PROGRESS NOTES
Kim discharged at 1100 via W/C transport. Belongings given per NA.  Meds retrieved from [facility] (only OTC). Other Rx meds were not covered by insurance as she had previously been given those at a PTA facility. I called the pts dtr earlier and left a VM that she needed to bring those meds to the new facility. My number was left and did not receive a call back from her with questions.

## 2023-06-13 NOTE — PROGRESS NOTES
Care Management Discharge Note    Discharge Date: 06/13/2023       Discharge Disposition: Assisted Living ()    [facility]  [address] [place] [address]  Phone: [phone]   Fax: [phone]  RN to RN Report: [phone]    Discharge Services:  (Home PT; all I/ADL assistance from  staff)    Discharge DME: None    Discharge Transportation: [facility] (ph: [phone]). Scheduled w/c ride with  window between 1298-2247    Private pay costs discussed: transportation costs previously discussed    Does the patient's insurance plan have a 3 day qualifying hospital stay waiver?  Yes   Will the waiver be used for post-acute placement? No    PAS Confirmation Code:  (N/A)  Patient/family educated on Medicare website which has current facility and service quality ratings: no    Education Provided on the Discharge Plan: Yes  Persons Notified of Discharge Plans: Krysta Coronel at [facility], pt's daughter, Alex; charge nurse, bedside nurse.  Patient/Family in Agreement with the Plan: yes    Handoff Referral Completed: Yes    Additional Information:  0905: Krysta called [facility], inquired what pt's discharge plan was. RNCC provided update that phone lines were down yesterday, apologized for lack of communication. HEIDE provided update of WC ride time. Krysta confirmed fax # for orders to be sent to (documented above). Krysta requested that pt come to facility with 30-day med supply. HEIDE confirmed that pt has orders for Home PT/OT, but [facility] agency was not secured. Krysta stated that she will send Home Healthcare orders to the agency the use.    0940: Discharge orders faxed.    1015: Krysta called back HEIDE, requested narcan to be discontinued, and updated orders to be sent. HEIDE provided update that, per bedside nurse, pharmacy unable to fill some home meds; bedside nurse to call pt's daughter to request home meds be brought to [facility].    1020: HEIDE paged Dr. Rodriguez, requested Narcan to be  discontinued.    1120: Updated discharge orders faxed to [facility].    1135: SW called Krysta (PH: [phone]), who confirmed that updated orders were received. Krysta reported that pt just arrived to facility.          BRANDEN Blanco, LSW  [facility]   PHONE: [phone]  Pager: [phone]

## 2023-06-13 NOTE — PLAN OF CARE
Physical Therapy Discharge Summary    Reason for therapy discharge:    Discharged to long term care facility.    Progress towards therapy goal(s). See goals on Care Plan in [facility] electronic health record for goal details.  Goals partially met.  Barriers to achieving goals:   discharge from facility.    Therapy recommendation(s):    Continued therapy is recommended.  Rationale/Recommendations:  Pt would benefit from continued therapies within LTC to further progress functional strength, independence, and safety.

## 2023-06-13 NOTE — PLAN OF CARE
Goal Outcome Evaluation:      Plan of Care Reviewed With: patient       VS: VSS   O2: SpO2 > 90%  On 2 LPM. LS clear and equal bilaterally. Denies chest pain and SOB.    Output: Incontinent B&B   Last BM: 6/13/23, Loose stools, C-diff sample sent    Activity: Up with A 1-2   Skin: WDL except, L hip incision-healing   Pain: Denies    CMS:  AOx 2   Dressing: None   Diet: Regular diet.    LDA: No PIV access.   Equipment: IV pole, personal belongings,    Plan: Discharge today to TCU/[facility] ([facility])   between 10:10 and 10:50   Additional Info:

## 2023-06-14 DIAGNOSIS — S72.002A HIP FRACTURE, LEFT, CLOSED, INITIAL ENCOUNTER (H): Primary | ICD-10-CM

## 2023-06-16 ENCOUNTER — TELEPHONE (OUTPATIENT)
Dept: ORTHOPEDICS | Facility: CLINIC | Age: 83
End: 2023-06-16
Payer: COMMERCIAL

## 2023-06-19 ENCOUNTER — TELEPHONE (OUTPATIENT)
Dept: ORTHOPEDICS | Facility: CLINIC | Age: 83
End: 2023-06-19

## 2023-06-20 NOTE — TELEPHONE ENCOUNTER
Left patient a voicemail to schedule a consult & mohs for   BCC of L central cheek    with Dr. Cole. Provided my direct phone number.    Chanel Bosch, Procedure  6/20/2023 8:46 AM

## 2023-06-27 NOTE — TELEPHONE ENCOUNTER
Called and spoke with Alex. Kim is in memory care and has a new PCP through that facility. Alex took down my direct phone number so that they can coordinate her appts with [facility] directly.     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 Prior Level of Function: General house work pain free. Running pain free. No restrictions at work. Ascend/ descend 10 stairs without discomfort. Medications: Verified on Patient Summary List    Start of Care: 2017      Onset Date: one year prior       The Plan of Care and following information is based on the information from the initial evaluation. Assessment/ miranda information: 29 y.o. Female presents with signs and symptoms consistent with left SIJ dysfunction and left hip pain.      Evaluation Complexity History MEDIUM  Complexity : 1-2 comorbidities / personal factors will impact the outcome/ POC ; Examination LOW Complexity : 1-2 Standardized tests and measures addressing body structure, function, activity limitation and / or participation in recreation  ;Presentation LOW Complexity : Stable, uncomplicated  ;Clinical Decision Making MEDIUM Complexity : FOTO score of 26-74  Overall Complexity Rating: LOW     Problem List: pain affecting function, decrease ROM, decrease strength, edema affecting function, impaired gait/ balance, decrease ADL/ functional abilitiies, decrease activity tolerance, decrease flexibility/ joint mobility and decrease transfer abilities   Treatment Plan may include any combination of the following: Therapeutic exercise, Therapeutic activities, Neuromuscular re-education, Physical agent/modality, Gait/balance training, Manual therapy, Patient education, Self Care training, Functional mobility training, Home safety training and Stair training  Patient / Family readiness to learn indicated by: asking questions, trying to perform skills and interest  Persons(s) to be included in education: patient (P)  Barriers to Learning/Limitations: None  Patient Goal (s): Functional activities with less pain. Don't expect pain free. Daily tasks without excruciating pain. Tighten core muscles. Stairs up and down with discomfort.   Patient Self Reported Health Status: NA  Rehabilitation Potential: good    Short Term Goals: To be accomplished in 2 weeks:   1. Pt will be independent with initially prescribed HEP to progress towards functional therapeutic exercises. 2. Pt will report >/= 25% decrease in pain to increase quality of life. 3. Pt will report ability to sit for more than 15 min without increase in pain so that she can perform work duties. 4. Pt will ambulate for 20 min without increase in symptoms to allow ability to perform household cleaning. Long Term Goals: To be accomplished in 4-6 weeks:   1. Pt will be independent with modified home/ gym exercise program without increase in symptoms to increase overall wellness and quality of life. 2. Pt will report ability to stand for one hour without increase in symptoms to allow ability to perform work duties without limitations. 3. Pt will report ability to sleep through night with reports of >/= 80% less pain while in bed. 4. Pt will demonstrate 5/5 posterior hip strength to reduce pain during ambulation and during functional squatting activities. 5. Pt will demonstrate ability to bridge with alternate leg march times two with minimal hip rotation to demonstrate overall increase in core strength. Frequency / Duration: Patient to be seen 1-2 times per week for 4-6 weeks.     Patient/ Caregiver education and instruction: self care, activity modification, brace/ splint application and exercises    [x]  Plan of care has been reviewed with LISA Kennedy 1/30/2017 4:30 PM    ________________________________________________________________________    I certify that the above Therapy Services are being furnished while the patient is under my care. I agree with the treatment plan and certify that this therapy is necessary.     [de-identified] Signature:____________________  Date:____________Time: _________

## 2023-06-28 NOTE — TELEPHONE ENCOUNTER
I spoke to Alex yesterday and recorded out conversation in the original scheduling TE.     Chanel Bosch, Procedure  6/28/2023 2:51 PM

## 2023-06-28 NOTE — TELEPHONE ENCOUNTER
Routing to [facility] to see if patient is ready to reschedule.     Mallika BOLANOS CMA

## 2023-08-14 ENCOUNTER — LAB REQUISITION (OUTPATIENT)
Dept: LAB | Facility: CLINIC | Age: 83
End: 2023-08-14
Payer: COMMERCIAL

## 2023-08-14 DIAGNOSIS — I10 ESSENTIAL (PRIMARY) HYPERTENSION: ICD-10-CM

## 2023-08-14 DIAGNOSIS — E78.5 HYPERLIPIDEMIA, UNSPECIFIED: ICD-10-CM

## 2023-08-15 LAB
ALBUMIN SERPL BCG-MCNC: 3.8 G/DL (ref 3.5–5.2)
ALP SERPL-CCNC: 91 U/L (ref 35–104)
ALT SERPL W P-5'-P-CCNC: 18 U/L (ref 0–50)
ANION GAP SERPL CALCULATED.3IONS-SCNC: 8 MMOL/L (ref 7–15)
AST SERPL W P-5'-P-CCNC: 28 U/L (ref 0–45)
BILIRUB SERPL-MCNC: 0.2 MG/DL
BUN SERPL-MCNC: 23.2 MG/DL (ref 8–23)
CALCIUM SERPL-MCNC: 9.9 MG/DL (ref 8.8–10.2)
CHLORIDE SERPL-SCNC: 107 MMOL/L (ref 98–107)
CHOLEST SERPL-MCNC: 162 MG/DL
CREAT SERPL-MCNC: 1.1 MG/DL (ref 0.51–0.95)
DEPRECATED HCO3 PLAS-SCNC: 26 MMOL/L (ref 22–29)
ERYTHROCYTE [DISTWIDTH] IN BLOOD BY AUTOMATED COUNT: 13.4 % (ref 10–15)
GFR SERPL CREATININE-BSD FRML MDRD: 50 ML/MIN/1.73M2
GLUCOSE SERPL-MCNC: 108 MG/DL (ref 70–99)
HCT VFR BLD AUTO: 39 % (ref 35–47)
HDLC SERPL-MCNC: 49 MG/DL
HGB BLD-MCNC: 12.5 G/DL (ref 11.7–15.7)
LDLC SERPL CALC-MCNC: 99 MG/DL
MCH RBC QN AUTO: 29.3 PG (ref 26.5–33)
MCHC RBC AUTO-ENTMCNC: 32.1 G/DL (ref 31.5–36.5)
MCV RBC AUTO: 92 FL (ref 78–100)
NONHDLC SERPL-MCNC: 113 MG/DL
PLATELET # BLD AUTO: 197 10E3/UL (ref 150–450)
POTASSIUM SERPL-SCNC: 4.7 MMOL/L (ref 3.4–5.3)
PROT SERPL-MCNC: 5.9 G/DL (ref 6.4–8.3)
RBC # BLD AUTO: 4.26 10E6/UL (ref 3.8–5.2)
SODIUM SERPL-SCNC: 141 MMOL/L (ref 136–145)
TRIGL SERPL-MCNC: 71 MG/DL
WBC # BLD AUTO: 5.3 10E3/UL (ref 4–11)

## 2023-08-15 PROCEDURE — 80053 COMPREHEN METABOLIC PANEL: CPT | Mod: ORL | Performed by: NURSE PRACTITIONER

## 2023-08-15 PROCEDURE — 80061 LIPID PANEL: CPT | Mod: ORL | Performed by: NURSE PRACTITIONER

## 2023-08-15 PROCEDURE — P9604 ONE-WAY ALLOW PRORATED TRIP: HCPCS | Mod: ORL | Performed by: NURSE PRACTITIONER

## 2023-08-15 PROCEDURE — 36415 COLL VENOUS BLD VENIPUNCTURE: CPT | Mod: ORL | Performed by: NURSE PRACTITIONER

## 2023-08-15 PROCEDURE — 85027 COMPLETE CBC AUTOMATED: CPT | Mod: ORL | Performed by: NURSE PRACTITIONER

## 2023-08-29 NOTE — DISCHARGE SUMMARY
Chief Complaint   Patient presents with    Well Child     13 year     Weight Loss    ADHD       Stephenie Claros female 13 y.o. 6 m.o.      History was provided by the mother.    Immunization History   Administered Date(s) Administered    DTaP / Hep B / IPV 2011, 2011, 2011, 2011    DTaP / IPV 05/27/2015    Hep A, 2 Dose 02/13/2012, 09/17/2012    Hib (PRP-OMP) 2011, 2011, 2011, 02/13/2012    MMR 05/03/2012    MMRV 05/27/2015    Meningococcal MCV4P (Menactra) 08/10/2022    Pneumococcal Conjugate 13-Valent (PCV13) 2011, 2011, 2011, 2011, 02/13/2012    Rotavirus Monovalent 2011, 2011    Rotavirus Pentavalent 2011    Tdap 08/10/2022    Varicella 05/03/2012       The following portions of the patient's history were reviewed and updated as appropriate: allergies, current medications, past family history, past medical history, past social history, past surgical history and problem list.     Current Outpatient Medications   Medication Sig Dispense Refill    Melatonin 10 MG capsule 1 capsule.      mirtazapine (REMERON) 15 MG tablet Take 1 tablet by mouth Every Night.      Pepcid 20 MG tablet Take 1 tablet by mouth 2 (Two) Times a Day.       No current facility-administered medications for this visit.       No Known Allergies      Current Issues:  Current concerns include pt on ADHD meds and increase remeron to 15 mg this week from [facility].  Needs to gain weight so she can take ADHD meds.  She did better on ADHD meds in past.  To see [facility] to restart meds.  Had been 77lbs and now up to 82lbs.      Review of Nutrition:  Current diet: reg  Balanced diet? yes  Exercise: active  Dentist: yes    Social Screening:  Discipline concerns? no  Concerns regarding behavior with peers? no  School performance: doing well; no concerns  thGthrthathdtheth:th th8th Secondhand smoke exposure? no    Helmet Use:  yes  Seat Belt Use: yes  Sunscreen Use:  yes  Smoke  [facility]  Hospitalist Discharge Summary      Date of Admission:  5/20/2023  Date of Discharge:  6/13/2023  Discharging Provider: Kwasi Rodriguez MD  Discharge Service: Hospitalist Service, GOLD TEAM 16    Discharge Diagnoses   1.  Mechanical fall on 5/20 resulting in mildly impacted subcapital left femoral neck fracture   2.  S/p closed reduction, percutaneous pinning hip on 5/20  3.  Moderate malnutrition      Follow-ups Needed After Discharge     Adult [facility] Follow-up and recommended labs and tests     Follow up with primary care provider, Sveta Emanuel, within 7 days for   hospital follow- up.  No follow up labs or test are needed.      Follow up 6 weeks, Dr. eRy. Repeat films AP pelvis, L hip cross table   lateral      Unresulted Labs Ordered in the Past 30 Days of this Admission     Date and Time Order Name Status Description    6/12/2023  9:22 AM C. difficile Toxin B PCR with reflex to C. difficile Antigen and Toxins A/B EIA In process       These results will be followed up by Hospitalist service    Discharge Disposition   Discharged to a new memory care unit  Condition at discharge: Stable    Hospital Course   Kim Ybarra is an 83 year old female with a past medical history significant for Alzheimer's dementia, HTN, HLD, overactive bladder, GERD, basal cell carcinoma, osteoporosis, frequent falls, recurrent UTIs, cervical spondylosis who presented from memory care assisted living facility on 5/20 to [facility] ED after a mechanical fall at home with c/o left hip pain.  Xray revealed left femoral neck fracture.     #  Mechanical fall on 5/20/23 resulting in mildly impacted subcapital left femoral neck fracture  #  S/p closed reduction, percutaneous pinning hip on 5/20 with Dr. Rey  ---    Trauma work up in ED included CT C/A/P, CT cervical spine, CT head, CT L Hip and XR pelvis with L hip.   ---   Findings notable for mildly impacted subcapital  "Detectors:  yes    Review of Systems   Constitutional:  Negative for activity change, appetite change, fatigue and fever.   HENT:  Negative for congestion, ear discharge, ear pain and sore throat.    Eyes:  Negative for pain, discharge and redness.   Respiratory:  Negative for cough, wheezing and stridor.    Gastrointestinal:  Negative for abdominal pain, constipation, diarrhea, nausea and vomiting.   Genitourinary:  Negative for dysuria.   Musculoskeletal:  Negative for myalgias.   Skin:  Negative for rash.   Neurological:  Negative for headache.   Psychiatric/Behavioral:  Positive for decreased concentration. Negative for behavioral problems and sleep disturbance.             BP (!) 102/54   Ht 161.3 cm (63.5\")   Wt 37.6 kg (82 lb 14.4 oz)   BMI 14.45 kg/mý     2 %ile (Z= -2.08) based on [facility] (Girls, 2-20 Years) BMI-for-age based on BMI available as of 8/29/2023.     Physical Exam  Vitals and nursing note reviewed.   Constitutional:       General: She is active. She is not in acute distress.     Appearance: Normal appearance. She is well-developed.   HENT:      Right Ear: Tympanic membrane normal. Tympanic membrane is not erythematous.      Left Ear: Tympanic membrane normal. Tympanic membrane is not erythematous.      Nose: Nose normal.      Mouth/Throat:      Lips: Pink.      Mouth: Mucous membranes are moist.      Pharynx: Oropharynx is clear.      Tonsils: No tonsillar exudate.   Eyes:      General:         Right eye: No discharge.         Left eye: No discharge.      Conjunctiva/sclera: Conjunctivae normal.   Cardiovascular:      Rate and Rhythm: Normal rate and regular rhythm.      Heart sounds: Normal heart sounds, S1 normal and S2 normal. No murmur heard.  Pulmonary:      Effort: Pulmonary effort is normal. No respiratory distress or retractions.      Breath sounds: Normal breath sounds. No stridor. No wheezing, rhonchi or rales.   Abdominal:      Palpations: Abdomen is soft.   Genitourinary:     " "left femoral neck fracture, small-moderate deep subcutaneous hematoma posterolateral left hip compatible with soft tissue contusion.   ---   She was taken to the OR on 5/20 and underwent closed reduction, percutaneous pinning hip with Dr Rey  ---   Participated in PT/OT  ---   Medically cleared for discharge today  ---   Follow up with Dr. Rey in 4-6 weeks      #  DVT prophylaxis  ---    mg po daily x 4 weeks     #  Acute postop left hip pain  ---   Tylenol        #  Ongoing diarrhea  ---   Not on laxatives  ---   Had periop antibiotic x 3 doses  ---   Stool c diff toxin B by PCR negative today   ---   Advised imodium as needed     #  Hypernatremia   ---   Due to volume depletion  ---   Na level was 155 at admission in setting of poor PO and fall.   ---   Na normalized with IV fluids       #  Acute renal insuffiency  --   Creatinine at admission was noted to be 1.02.    ---   Now back to normal of 0.87 - 0.96  ---   Ok to reintroduce PTA Losartan   #  Mixed Alzheimer's and vascular dementia   ---   No longer on Aricept  ---   She is d/cing to a memory care unit     #  HTN  ---   Under good control  ---   Reintroduce PTA losartan     #  HLD  ---   Continue PTA statin     #  Lung nodules  ---   Few sub 6mm pulmonary nodules noted incidentally on CT C/A/P at admission.  ---   F/u chest CT in 12 months recommended     #  Fluid attenuation anterior mediastinum  ---   Noted incidentally on CT C/A/P at admission, 2.5x2.7 cm in anterior mediastinum   ---   Reviewed previous CT reading, CT form 6/22/2021 describes  \" 2.5 cm low-attenuation nodule anterior mediastinum most compatible with a benign cyst.     #  Moderate Malnutrition  ---   Based on nutrition assessment    ---   Nutritional supplement provided       Consultations This Hospital Stay   CARE MANAGEMENT / SOCIAL WORK IP CONSULT  PHYSICAL THERAPY ADULT IP CONSULT  OCCUPATIONAL THERAPY ADULT IP CONSULT  PHYSICAL THERAPY ADULT IP CONSULT  OCCUPATIONAL " General: Normal vulva.   Musculoskeletal:         General: Normal range of motion.      Cervical back: Normal range of motion and neck supple. No rigidity.      Comments: No scoliosis   Lymphadenopathy:      Cervical: No cervical adenopathy.   Skin:     General: Skin is warm and dry.      Findings: No rash.   Neurological:      Mental Status: She is alert and oriented for age.   Psychiatric:         Mood and Affect: Mood normal.         Behavior: Behavior normal.         Thought Content: Thought content normal.               Healthy 13 y.o.  well child.        1. Anticipatory guidance discussed.  Gave handout on well-child issues at this age.    The patient and parent(s) were instructed in water safety, burn safety, firearm safety, and stranger safety.  Helmet use was indicated for any bike riding, scooter, rollerblades, skateboards, or skiing. They were instructed that children should sit  in the back seat of the car, if there is an air bag, until age 13.  Encouraged annual dental visits and appropriate dental hygiene.  Encouraged participation in household chores. Recommended limiting screen time to <2hrs daily and encouraging at least one hour of active play daily.  If participating in sports, use proper personal safety equipment.    Age appropriate counseling provided on smoking, alcohol use, illicit drug use, and sexual activity.    2.  Weight management:  The patient was counseled regarding nutrition.    3. Development: appropriate for age    4.Immunizations: discussed risk/benefits to vaccinations ordered today, reviewed components of the vaccine, discussed CDC VIS, discussed informed consent and informed consent obtained. Counseled regarding s/s or adverse effects and when to seek medical attention.  Patient/family was allowed to accept or refuse vaccine. Questions answered to satisfactory state of patient. We reviewed typical age appropriate and seasonally appropriate vaccinations. Reviewed immunization  THERAPY ADULT IP CONSULT    Code Status   No CPR- Do NOT Intubate    Time Spent on this Encounter   I, Kwasi Rodriguez MD, personally saw the patient today and spent greater than 30 minutes discharging this patient.       Kwasi Rodriguez MD  [facility]  [facility] [address]  Phone: [phone]  Fax: [phone]  ______________________________________________________________________    Physical Exam   Vital Signs: Temp: 97.7  F (36.5  C) Temp src: Oral BP: (!) 142/75 Pulse: 91   Resp: 16 SpO2: 97 % O2 Device: None (Room air)    Weight: 135 lbs 0 oz  General: Awake, alert, follows simple command, NAD.  HEENT:  NC/AT, neck supple, op clear, mmm.  CVS:  NL s 1 and s2, no m/r/g.  Lungs:  CTA B/L.   Abd:  Soft, + bs, NT, no rebound or gaurding, no fluid shift.  Ext:  Left hip incision site clear  Lymph:  No edema.  Neuro:  Nonfocal.  Musculoskeletal: No calf tenderness to palpation.    Skin:  No rash.  Psychiatry:  Mood and affect appropriate.         Primary Care Physician   Sveta Emanuel    Discharge Orders      Home Care Referral      Reason for your hospital stay    Hip fracture     Activity    Your activity upon discharge: activity as tolerated     Adult [facility] Follow-up and recommended labs and tests    Follow up with primary care provider, Sveta Emanuel, within 7 days for hospital follow- up.  No follow up labs or test are needed.      Follow up 6 weeks, Dr. Rey. Repeat films AP pelvis, L hip cross table lateral      Appointments on [facility] and/or [facility] (with [facility] or [facility] provider or service). Call [phone] if you haven't heard regarding these appointments within 7 days of discharge.     General info for SNF    Length of Stay Estimate: Short Term Care: Estimated # of Days <30  Condition at Discharge: Improving  Level of care:skilled   Rehabilitation Potential: Fair  Admission H&P remains valid and up-to-date: Yes  Recent  Chemotherapy: N/A  Use Nursing Home Standing Orders: Yes     Mantoux instructions    Give two-step Mantoux (PPD) Per Facility Policy Yes     Follow Up and recommended labs and tests    Follow up with Nursing home physician.      Follow up 4 weeks, Dr. Rey. Repeat films AP pelvis, L hip cross table lateral     Reason for your hospital stay    Hip fracture     Activity - Up with nursing assistance     No CPR- Do NOT Intubate    Per previous documentation     Physical Therapy Adult Consult    Evaluate and treat as clinically indicated.    Reason:  weakness hip fracture     Occupational Therapy Adult Consult    Evaluate and treat as clinically indicated.    Reason:  weakness hip fracture     Fall precautions     Diet    Follow this diet upon discharge: Orders Placed This Encounter      Regular Diet Adult     Diet    Follow this diet upon discharge:   Regular Diet Adult      Diet       Discharge Medications   Current Discharge Medication List      START taking these medications    Details   acetaminophen (TYLENOL) 325 MG tablet Take 2 tablets (650 mg) by mouth every 6 hours as needed for mild pain  Qty: 60 tablet, Refills: 0    Associated Diagnoses: Hip fracture, left, closed, initial encounter (H)      aspirin (ASA) 81 MG chewable tablet Take 2 tablets (162 mg) by mouth daily  Qty: 28 tablet, Refills: 0    Associated Diagnoses: Hip fracture, left, closed, initial encounter (H)         CONTINUE these medications which have CHANGED    Details   losartan (COZAAR) 50 MG tablet Take 1 tablet (50 mg) by mouth daily  Qty: 30 tablet, Refills: 0    Associated Diagnoses: Hypertension, unspecified type      multivitamin w/minerals (CERTAVITE/ANTIOXIDANTS) tablet Take 1 tablet by mouth daily  Qty: 30 tablet, Refills: 0    Associated Diagnoses: Hip fracture, left, closed, initial encounter (H)      simvastatin (ZOCOR) 40 MG tablet Take 1 tablet (40 mg) by mouth daily  Qty: 30 tablet, Refills: 0    Associated Diagnoses:  history and updated state vaccination form as needed. Up to date.  Declines HPV.      Assessment & Plan     Diagnoses and all orders for this visit:    1. Encounter for well child visit at 13 years of age (Primary)  -     POC Hemoglobin    2. BMI (body mass index), pediatric, less than 5th percentile for age      Enc to cont eating 3 meals and snacks daily.    F/u with [facility] for meds and ADHD    Return in about 1 year (around 8/29/2024) for Annual physical.            Hyperlipidemia LDL goal <100         CONTINUE these medications which have NOT CHANGED    Details   glucosamine-chondroitin 500-400 MG CAPS per capsule Take 1 capsule by mouth daily         STOP taking these medications       Calcium Polycarbophil (FIBER) 625 MG tablet Comments:   Reason for Stopping:             Allergies   Allergies   Allergen Reactions     Other Food Allergy Unknown     Beef

## 2023-10-04 ENCOUNTER — APPOINTMENT (OUTPATIENT)
Dept: CT IMAGING | Facility: HOSPITAL | Age: 83
DRG: 522 | End: 2023-10-04
Attending: EMERGENCY MEDICINE
Payer: COMMERCIAL

## 2023-10-04 ENCOUNTER — APPOINTMENT (OUTPATIENT)
Dept: RADIOLOGY | Facility: HOSPITAL | Age: 83
DRG: 522 | End: 2023-10-04
Attending: EMERGENCY MEDICINE
Payer: COMMERCIAL

## 2023-10-04 ENCOUNTER — HOSPITAL ENCOUNTER (INPATIENT)
Facility: HOSPITAL | Age: 83
LOS: 5 days | Discharge: NURSING, REHAB OR LONG-TERM CARE | DRG: 522 | End: 2023-10-09
Attending: EMERGENCY MEDICINE | Admitting: INTERNAL MEDICINE
Payer: COMMERCIAL

## 2023-10-04 ENCOUNTER — MEDICAL CORRESPONDENCE (OUTPATIENT)
Dept: HEALTH INFORMATION MANAGEMENT | Facility: CLINIC | Age: 83
End: 2023-10-04

## 2023-10-04 ENCOUNTER — APPOINTMENT (OUTPATIENT)
Dept: RADIOLOGY | Facility: HOSPITAL | Age: 83
DRG: 522 | End: 2023-10-04
Attending: PHYSICIAN ASSISTANT
Payer: COMMERCIAL

## 2023-10-04 DIAGNOSIS — S72.001A HIP FRACTURE, RIGHT, CLOSED, INITIAL ENCOUNTER (H): ICD-10-CM

## 2023-10-04 DIAGNOSIS — S72.002A HIP FRACTURE, LEFT, CLOSED, INITIAL ENCOUNTER (H): Primary | ICD-10-CM

## 2023-10-04 LAB
ALBUMIN SERPL BCG-MCNC: 2.8 G/DL (ref 3.5–5.2)
ALP SERPL-CCNC: 86 U/L (ref 35–104)
ALT SERPL W P-5'-P-CCNC: 22 U/L (ref 0–50)
ANION GAP SERPL CALCULATED.3IONS-SCNC: 9 MMOL/L (ref 7–15)
AST SERPL W P-5'-P-CCNC: 19 U/L (ref 0–45)
BASO+EOS+MONOS # BLD AUTO: ABNORMAL 10*3/UL
BASO+EOS+MONOS NFR BLD AUTO: ABNORMAL %
BASOPHILS # BLD AUTO: 0 10E3/UL (ref 0–0.2)
BASOPHILS NFR BLD AUTO: 0 %
BILIRUB DIRECT SERPL-MCNC: <0.2 MG/DL (ref 0–0.3)
BILIRUB SERPL-MCNC: 0.3 MG/DL
BUN SERPL-MCNC: 24.5 MG/DL (ref 8–23)
CALCIUM SERPL-MCNC: 9.3 MG/DL (ref 8.8–10.2)
CHLORIDE SERPL-SCNC: 100 MMOL/L (ref 98–107)
CREAT SERPL-MCNC: 1.12 MG/DL (ref 0.51–0.95)
DEPRECATED HCO3 PLAS-SCNC: 24 MMOL/L (ref 22–29)
EGFRCR SERPLBLD CKD-EPI 2021: 49 ML/MIN/1.73M2
EOSINOPHIL # BLD AUTO: 0.2 10E3/UL (ref 0–0.7)
EOSINOPHIL NFR BLD AUTO: 2 %
ERYTHROCYTE [DISTWIDTH] IN BLOOD BY AUTOMATED COUNT: 12.4 % (ref 10–15)
GLUCOSE SERPL-MCNC: 102 MG/DL (ref 70–99)
HCT VFR BLD AUTO: 36.8 % (ref 35–47)
HGB BLD-MCNC: 12.4 G/DL (ref 11.7–15.7)
IMM GRANULOCYTES # BLD: 0 10E3/UL
IMM GRANULOCYTES NFR BLD: 0 %
INR PPP: 1.22 (ref 0.85–1.15)
LIPASE SERPL-CCNC: 27 U/L (ref 13–60)
LYMPHOCYTES # BLD AUTO: 0.9 10E3/UL (ref 0.8–5.3)
LYMPHOCYTES NFR BLD AUTO: 10 %
MCH RBC QN AUTO: 28.6 PG (ref 26.5–33)
MCHC RBC AUTO-ENTMCNC: 33.7 G/DL (ref 31.5–36.5)
MCV RBC AUTO: 85 FL (ref 78–100)
MONOCYTES # BLD AUTO: 1.4 10E3/UL (ref 0–1.3)
MONOCYTES NFR BLD AUTO: 14 %
NEUTROPHILS # BLD AUTO: 7.3 10E3/UL (ref 1.6–8.3)
NEUTROPHILS NFR BLD AUTO: 74 %
NRBC # BLD AUTO: 0 10E3/UL
NRBC BLD AUTO-RTO: 0 /100
PLATELET # BLD AUTO: 238 10E3/UL (ref 150–450)
POTASSIUM SERPL-SCNC: 4.5 MMOL/L (ref 3.4–5.3)
PROCALCITONIN SERPL IA-MCNC: 0.1 NG/ML
PROT SERPL-MCNC: 5.9 G/DL (ref 6.4–8.3)
RBC # BLD AUTO: 4.33 10E6/UL (ref 3.8–5.2)
SODIUM SERPL-SCNC: 133 MMOL/L (ref 135–145)
WBC # BLD AUTO: 9.9 10E3/UL (ref 4–11)

## 2023-10-04 PROCEDURE — 85610 PROTHROMBIN TIME: CPT | Performed by: EMERGENCY MEDICINE

## 2023-10-04 PROCEDURE — 72125 CT NECK SPINE W/O DYE: CPT

## 2023-10-04 PROCEDURE — 258N000003 HC RX IP 258 OP 636: Performed by: INTERNAL MEDICINE

## 2023-10-04 PROCEDURE — 93005 ELECTROCARDIOGRAM TRACING: CPT | Performed by: EMERGENCY MEDICINE

## 2023-10-04 PROCEDURE — 36415 COLL VENOUS BLD VENIPUNCTURE: CPT | Performed by: PHYSICIAN ASSISTANT

## 2023-10-04 PROCEDURE — 83690 ASSAY OF LIPASE: CPT | Performed by: PHYSICIAN ASSISTANT

## 2023-10-04 PROCEDURE — 70450 CT HEAD/BRAIN W/O DYE: CPT

## 2023-10-04 PROCEDURE — 80053 COMPREHEN METABOLIC PANEL: CPT | Performed by: PHYSICIAN ASSISTANT

## 2023-10-04 PROCEDURE — 71045 X-RAY EXAM CHEST 1 VIEW: CPT

## 2023-10-04 PROCEDURE — 250N000013 HC RX MED GY IP 250 OP 250 PS 637: Performed by: INTERNAL MEDICINE

## 2023-10-04 PROCEDURE — 82310 ASSAY OF CALCIUM: CPT | Performed by: PHYSICIAN ASSISTANT

## 2023-10-04 PROCEDURE — 258N000003 HC RX IP 258 OP 636: Performed by: PHYSICIAN ASSISTANT

## 2023-10-04 PROCEDURE — 82248 BILIRUBIN DIRECT: CPT | Performed by: PHYSICIAN ASSISTANT

## 2023-10-04 PROCEDURE — 96361 HYDRATE IV INFUSION ADD-ON: CPT

## 2023-10-04 PROCEDURE — 120N000001 HC R&B MED SURG/OB

## 2023-10-04 PROCEDURE — 84145 PROCALCITONIN (PCT): CPT | Performed by: INTERNAL MEDICINE

## 2023-10-04 PROCEDURE — 99285 EMERGENCY DEPT VISIT HI MDM: CPT | Mod: 25

## 2023-10-04 PROCEDURE — 250N000011 HC RX IP 250 OP 636: Mod: JZ | Performed by: INTERNAL MEDICINE

## 2023-10-04 PROCEDURE — 36415 COLL VENOUS BLD VENIPUNCTURE: CPT | Performed by: EMERGENCY MEDICINE

## 2023-10-04 PROCEDURE — 250N000011 HC RX IP 250 OP 636: Mod: JZ | Performed by: EMERGENCY MEDICINE

## 2023-10-04 PROCEDURE — 74177 CT ABD & PELVIS W/CONTRAST: CPT

## 2023-10-04 PROCEDURE — 99223 1ST HOSP IP/OBS HIGH 75: CPT | Performed by: INTERNAL MEDICINE

## 2023-10-04 PROCEDURE — 73502 X-RAY EXAM HIP UNI 2-3 VIEWS: CPT

## 2023-10-04 PROCEDURE — 85025 COMPLETE CBC W/AUTO DIFF WBC: CPT | Performed by: PHYSICIAN ASSISTANT

## 2023-10-04 PROCEDURE — 96360 HYDRATION IV INFUSION INIT: CPT | Mod: 59

## 2023-10-04 RX ORDER — LOSARTAN POTASSIUM 50 MG/1
50 TABLET ORAL DAILY
Status: DISCONTINUED | OUTPATIENT
Start: 2023-10-05 | End: 2023-10-09 | Stop reason: HOSPADM

## 2023-10-04 RX ORDER — NALOXONE HYDROCHLORIDE 0.4 MG/ML
0.4 INJECTION, SOLUTION INTRAMUSCULAR; INTRAVENOUS; SUBCUTANEOUS
Status: DISCONTINUED | OUTPATIENT
Start: 2023-10-04 | End: 2023-10-09 | Stop reason: HOSPADM

## 2023-10-04 RX ORDER — ACETAMINOPHEN 325 MG/1
650 TABLET ORAL EVERY 6 HOURS PRN
Status: DISCONTINUED | OUTPATIENT
Start: 2023-10-04 | End: 2023-10-04

## 2023-10-04 RX ORDER — NALOXONE HYDROCHLORIDE 0.4 MG/ML
0.2 INJECTION, SOLUTION INTRAMUSCULAR; INTRAVENOUS; SUBCUTANEOUS
Status: DISCONTINUED | OUTPATIENT
Start: 2023-10-04 | End: 2023-10-09 | Stop reason: HOSPADM

## 2023-10-04 RX ORDER — ONDANSETRON 4 MG/1
4 TABLET, ORALLY DISINTEGRATING ORAL EVERY 6 HOURS PRN
Status: DISCONTINUED | OUTPATIENT
Start: 2023-10-04 | End: 2023-10-05

## 2023-10-04 RX ORDER — SODIUM CHLORIDE 9 MG/ML
INJECTION, SOLUTION INTRAVENOUS CONTINUOUS
Status: DISCONTINUED | OUTPATIENT
Start: 2023-10-04 | End: 2023-10-05

## 2023-10-04 RX ORDER — ACETAMINOPHEN 325 MG/1
650 TABLET ORAL EVERY 6 HOURS PRN
Status: DISCONTINUED | OUTPATIENT
Start: 2023-10-04 | End: 2023-10-05

## 2023-10-04 RX ORDER — ASPIRIN 81 MG/1
162 TABLET, CHEWABLE ORAL DAILY
Status: DISCONTINUED | OUTPATIENT
Start: 2023-10-04 | End: 2023-10-05

## 2023-10-04 RX ORDER — SIMVASTATIN 10 MG
40 TABLET ORAL DAILY
Status: DISCONTINUED | OUTPATIENT
Start: 2023-10-05 | End: 2023-10-09 | Stop reason: HOSPADM

## 2023-10-04 RX ORDER — CEFTRIAXONE 1 G/1
1 INJECTION, POWDER, FOR SOLUTION INTRAMUSCULAR; INTRAVENOUS EVERY 24 HOURS
Status: DISCONTINUED | OUTPATIENT
Start: 2023-10-04 | End: 2023-10-05

## 2023-10-04 RX ORDER — HYDROMORPHONE HCL IN WATER/PF 6 MG/30 ML
0.2 PATIENT CONTROLLED ANALGESIA SYRINGE INTRAVENOUS
Status: DISCONTINUED | OUTPATIENT
Start: 2023-10-04 | End: 2023-10-05

## 2023-10-04 RX ORDER — IOPAMIDOL 755 MG/ML
66 INJECTION, SOLUTION INTRAVASCULAR ONCE
Status: COMPLETED | OUTPATIENT
Start: 2023-10-04 | End: 2023-10-04

## 2023-10-04 RX ORDER — POLYETHYLENE GLYCOL 3350 17 G/17G
17 POWDER, FOR SOLUTION ORAL DAILY
Status: DISCONTINUED | OUTPATIENT
Start: 2023-10-04 | End: 2023-10-05

## 2023-10-04 RX ORDER — SENNOSIDES 8.6 MG
8.6 TABLET ORAL 2 TIMES DAILY
Status: DISCONTINUED | OUTPATIENT
Start: 2023-10-04 | End: 2023-10-05

## 2023-10-04 RX ORDER — ONDANSETRON 2 MG/ML
4 INJECTION INTRAMUSCULAR; INTRAVENOUS EVERY 6 HOURS PRN
Status: DISCONTINUED | OUTPATIENT
Start: 2023-10-04 | End: 2023-10-05

## 2023-10-04 RX ORDER — ACETAMINOPHEN 650 MG/1
650 SUPPOSITORY RECTAL EVERY 6 HOURS PRN
Status: DISCONTINUED | OUTPATIENT
Start: 2023-10-04 | End: 2023-10-05

## 2023-10-04 RX ADMIN — SODIUM CHLORIDE 1000 ML: 9 INJECTION, SOLUTION INTRAVENOUS at 12:05

## 2023-10-04 RX ADMIN — IOPAMIDOL 66 ML: 755 INJECTION, SOLUTION INTRAVENOUS at 13:06

## 2023-10-04 RX ADMIN — CEFTRIAXONE SODIUM 1 G: 1 INJECTION, POWDER, FOR SOLUTION INTRAMUSCULAR; INTRAVENOUS at 16:12

## 2023-10-04 RX ADMIN — SENNOSIDES 8.6 MG: 8.6 TABLET, FILM COATED ORAL at 20:23

## 2023-10-04 RX ADMIN — SODIUM CHLORIDE: 9 INJECTION, SOLUTION INTRAVENOUS at 16:11

## 2023-10-04 ASSESSMENT — ACTIVITIES OF DAILY LIVING (ADL)
TOILETING_ISSUES: YES
COMMUNICATION: UNABLE TO UNDERSTAND
BATHING: 2-->COMPLETELY DEPENDENT (NOT DEVELOPMENTALLY APPROPRIATE)
DESCRIBE_HEARING_LOSS: BILATERAL HEARING LOSS
TOILETING: 2-->COMPLETELY DEPENDENT
DOING_ERRANDS_INDEPENDENTLY_DIFFICULTY: NO
DIFFICULTY_EATING/SWALLOWING: YES
WALKING_OR_CLIMBING_STAIRS: AMBULATION DIFFICULTY, DEPENDENT
WEAR_GLASSES_OR_BLIND: NO
DRESS: 2-->COMPLETELY DEPENDENT (NOT DEVELOPMENTALLY APPROPRIATE)
DRESS: 2-->COMPLETELY DEPENDENT
TOILETING_ASSISTANCE: TOILETING DIFFICULTY, DEPENDENT
ADLS_ACUITY_SCORE: 54
SWALLOWING: 0-->SWALLOWS FOODS/LIQUIDS WITHOUT DIFFICULTY
CONCENTRATING,_REMEMBERING_OR_MAKING_DECISIONS_DIFFICULTY: YES
ADLS_ACUITY_SCORE: 35
TRANSFERRING: 2-->COMPLETELY DEPENDENT (NOT DEVELOPMENTALLY APPROPRIATE)
ADLS_ACUITY_SCORE: 54
CHANGE_IN_FUNCTIONAL_STATUS_SINCE_ONSET_OF_CURRENT_ILLNESS/INJURY: YES
ADLS_ACUITY_SCORE: 35
ADLS_ACUITY_SCORE: 35
DRESSING/BATHING: BATHING DIFFICULTY, ASSISTANCE 1 PERSON;DRESSING DIFFICULTY, ASSISTANCE 1 PERSON
WERE_AUXILIARY_AIDS_OFFERED?: NO
ADLS_ACUITY_SCORE: 54
HEARING_DIFFICULTY_OR_DEAF: YES
FALL_HISTORY_WITHIN_LAST_SIX_MONTHS: YES
TOILETING: 2-->COMPLETELY DEPENDENT (NOT DEVELOPMENTALLY APPROPRIATE)
EATING/SWALLOWING: OTHER (SEE COMMENTS)
DRESSING/BATHING_DIFFICULTY: YES
NUMBER_OF_TIMES_PATIENT_HAS_FALLEN_WITHIN_LAST_SIX_MONTHS: 3
PATIENT'S_PREFERRED_MEANS_OF_COMMUNICATION: ENGLISH SPEAKER WITH HEARING LOSS, NO SPEECH PROBLEMS.
SWALLOWING: 0-->SWALLOWS FOODS/LIQUIDS WITHOUT DIFFICULTY (DEVELOPMENTALLY APPROPRIATE)
DIFFICULTY_COMMUNICATING: YES
WALKING_OR_CLIMBING_STAIRS_DIFFICULTY: YES
DRESSING/BATHING_MANAGEMENT: ASSIST OF 1
TRANSFERRING: 2-->COMPLETELY DEPENDENT
EATING: 2-->COMPLETELY DEPENDENT (NOT DEVELOPMENTALLY APPROPRIATE)
EATING: 2-->COMPLETELY DEPENDENT

## 2023-10-04 NOTE — PHARMACY-ADMISSION MEDICATION HISTORY
Pharmacist Admission Medication History    Admission medication history is complete. The information provided in this note is only as accurate as the sources available at the time of the update.    Information Source(s): Facility (U/NH/) medication list/MAR and CareEverywhere/SureScripts via N/A    Pertinent Information:     Changes made to PTA medication list:  Added: None  Deleted: None  Changed: None    Medication Affordability:  Not including over the counter (OTC) medications, was there a time in the past 3 months when you did not take your medications as prescribed because of cost?: Unable to Assess    Allergies reviewed with patient and updates made in EHR: unable to assess    Medication History Completed By: Stephanie Peters [facility] 10/4/2023 2:14 PM    PTA Med List   Medication Sig Last Dose    acetaminophen (TYLENOL) 325 MG tablet Take 2 tablets (650 mg) by mouth every 6 hours as needed for mild pain Past Week    aspirin (ASA) 81 MG chewable tablet Take 2 tablets (162 mg) by mouth daily 10/4/2023 at am    glucosamine-chondroitin 500-400 MG CAPS per capsule Take 1 capsule by mouth daily 10/4/2023 at am    losartan (COZAAR) 50 MG tablet Take 1 tablet (50 mg) by mouth daily 10/4/2023 at am    multivitamin w/minerals (CERTAVITE/ANTIOXIDANTS) tablet Take 1 tablet by mouth daily 10/4/2023 at am    simvastatin (ZOCOR) 40 MG tablet Take 1 tablet (40 mg) by mouth daily 10/4/2023 at am

## 2023-10-04 NOTE — PLAN OF CARE
Goal Outcome Evaluation:      Plan of Care Reviewed With: patient    Overall Patient Progress: no change    Patient only oriented to self - by name only - not . Patient presents from ER. Had fall last Thursday at Assisted Living Facility, thought she was fine and then continued to decline in ambulation and cognitive abilities. Facility suggested she come to ER to get evaluated. Patient is a total care of all bathroom needs, dressing, feeding, etc. DaughterAlex, is POA and point of contact for all issues. She is a principal so please leave a voicemail for any pressing issues. Would like to be contacted about surgical plan for tomorrow, .  Daughter has parent teacher conferences tomorrow so am is best to contact if there is any information. Admission information has been completed,information comes from daughterAlex. Ana is supposed to have her Advanced Directive on file.

## 2023-10-04 NOTE — ED PROVIDER NOTES
Emergency Department Midlevel Supervisory Note     I personally saw the patient and performed a substantive portion of the visit including all aspects of the medical decision making.    ED Course:  12:05 PM  Chante Staley PA-C  staffed patient with me. I agree with their assessment and plan of management, and I will see the patient.  12:11 PM I met with the patient to introduce myself, gather additional history, perform my initial exam, and discuss the plan.   1:30 PM Spoke with radiology regarding patient's imaging.    Brief HPI:     Kim Ybarra is a 83 year old female who presents for evaluation of abdominal pain.  History significantly limited as the patient has dementia and is unable to provide reliable context of her presentation to the emergency department.  Initial report from EMS was that patient had an intermittent palpable mass in the right lower quadrant.      HPI limited due to dementia.    Per EMS, patient comes to the ED for intermittent right lower quadrant abdominal pain with concerns for a hernia or UTI.    I, Samanta Alexandre, am serving as a scribe to document services personally performed by Ruy Howell MD, based on my observations and the provider's statements to me.   I, Ruy Howell MD, attest that Samanta Alexandre was acting in a scribe capacity, has observed my performance of the services and has documented them in accordance with my direction.    Brief Physical Exam: BP (!) 160/71   Pulse 91   Temp 98.1  F (36.7  C) (Oral)   Resp 16   LMP  (LMP Unknown)   SpO2 97%   Constitutional:  Alert, in no acute distress  EYES: Conjunctivae clear  HENT:  Atraumatic, normocephalic  Respiratory:  Respirations even, unlabored, in no acute respiratory distress  Cardiovascular:  Regular rate and rhythm, good peripheral perfusion  GI: Soft, nondistended, nontender, no palpable masses, no rebound, no guarding   Neurologic:  Alert & oriented, no focal deficits noted  Psych: Normal mood and affect      MDM:  Patient presenting with report of intermittent palpable mass in the right lower quadrant needing a hernia assessment and UTI assessment this was history provided by EMS.  Patient herself cannot provide us with any reliable history.  As we worked through the patient's case ultimately reached out to the care facility who indicated that was in fact a primary concern but they also did relate that she is having more difficulty with transfers and has had multiple falls over the last several days.  Interestingly, the CT scan demonstrating a new right femoral neck fracture.  This was discussed with orthopedics and ultimately we are planning to meet the patient to the hospital for further care and management.  We added on some additional trauma studies given the lack of reliable history CT head negative CT cervical spine negative proceeding with admission to the hospital.  Please see KRYSTINA note for further details.       1. Hip fracture, right, closed, initial encounter (H)        Labs and Imaging:  Results for orders placed or performed during the hospital encounter of 10/04/23   CT Abdomen Pelvis w Contrast    Impression    IMPRESSION:   1.  New mildly displaced right femoral neck fracture.    2.  Large amount of retained stool in the colon, which could indicate constipation. No acute inflammation or obstruction.    3.  Tiny left pleural effusion with associated airspace opacities.    Dr. Howell was contacted by me on 10/4/2023 1:31 PM CDT and verbalized understanding of the result.   Basic metabolic panel   Result Value Ref Range    Sodium 133 (L) 135 - 145 mmol/L    Potassium 4.5 3.4 - 5.3 mmol/L    Chloride 100 98 - 107 mmol/L    Carbon Dioxide (CO2) 24 22 - 29 mmol/L    Anion Gap 9 7 - 15 mmol/L    Urea Nitrogen 24.5 (H) 8.0 - 23.0 mg/dL    Creatinine 1.12 (H) 0.51 - 0.95 mg/dL    GFR Estimate 49 (L) >60 mL/min/1.73m2    Calcium 9.3 8.8 - 10.2 mg/dL    Glucose 102 (H) 70 - 99 mg/dL   CBC with platelets and  differential   Result Value Ref Range    WBC Count 9.9 4.0 - 11.0 10e3/uL    RBC Count 4.33 3.80 - 5.20 10e6/uL    Hemoglobin 12.4 11.7 - 15.7 g/dL    Hematocrit 36.8 35.0 - 47.0 %    MCV 85 78 - 100 fL    MCH 28.6 26.5 - 33.0 pg    MCHC 33.7 31.5 - 36.5 g/dL    RDW 12.4 10.0 - 15.0 %    Platelet Count 238 150 - 450 10e3/uL    % Neutrophils 74 %    % Lymphocytes 10 %    % Monocytes 14 %    Mids % (Monos, Eos, Basos)      % Eosinophils 2 %    % Basophils 0 %    % Immature Granulocytes 0 %    NRBCs per 100 WBC 0 <1 /100    Absolute Neutrophils 7.3 1.6 - 8.3 10e3/uL    Absolute Lymphocytes 0.9 0.8 - 5.3 10e3/uL    Absolute Monocytes 1.4 (H) 0.0 - 1.3 10e3/uL    Mids Abs (Monos, Eos, Basos)      Absolute Eosinophils 0.2 0.0 - 0.7 10e3/uL    Absolute Basophils 0.0 0.0 - 0.2 10e3/uL    Absolute Immature Granulocytes 0.0 <=0.4 10e3/uL    Absolute NRBCs 0.0 10e3/uL   Hepatic function panel   Result Value Ref Range    Protein Total 5.9 (L) 6.4 - 8.3 g/dL    Albumin 2.8 (L) 3.5 - 5.2 g/dL    Bilirubin Total 0.3 <=1.2 mg/dL    Alkaline Phosphatase 86 35 - 104 U/L    AST 19 0 - 45 U/L    ALT 22 0 - 50 U/L    Bilirubin Direct <0.20 0.00 - 0.30 mg/dL   Result Value Ref Range    Lipase 27 13 - 60 U/L     I have reviewed the relevant laboratory and radiology studies      EKG:  Performed at: 10/4/2023 14:05:33    Impression: Sinus rhythm with premature atrial complexes, Anteroseptal infarct, Abnormal ECG    Rate: 90  Rhythm: Sinus rhythm  Axis: 70  NV Interval: 146  QRS Interval: 82  QTc Interval: 435  ST Changes: None  Comparison: Nonspecific T wave abnormality no longer evident in inferior leads, Anteroseptal infarct is now present, Premature atrial complexes are now present when compared to ECG of 3/12/2023 06:07    I have independently reviewed and interpreted the EKG(s) documented above.      Procedures:  I was present for the key portions of this procedure: none    Ruy Howell MD  [facility]  EMERGENCY DEPARTMENT  [address] [place] [id number]  [phone]       Ruy Howell MD  10/04/23 0880

## 2023-10-04 NOTE — ED PROVIDER NOTES
EMERGENCY DEPARTMENT ENCOUNTER   NAME: Kim Ybarra ; AGE: 83 year old female ; YOB: 1940 ; MRN: [id number] ; PCP: Sveta Emanuel     Evaluation Date & Time:   10/4/2023 11:19 AM    ED PROVIDER: Chante Staley PA-C    CHIEF COMPLAINT     Abdominal Pain      ED COURSE, MEDICAL DECISION MAKING, PLAN   Pertinent Labs & Imaging studies reviewed. (See chart for details)     ED course:   11:58 AM: Evaluated by myself and orders placed  12:11 PM: Dr. Howell evaluated patient   1:00 PM: Attempted to call patient's daughter for collaborative information, however no answer.   1:40 PM: Called and spoke with the RN from her care facility to obtain more information.   2:17 PM: Both [facility] spoken with for admission.   2:19 PM: Bed requested    MDM:     Patient is a 83 year old female presenting to the ED by EMS for increased confusion noted by memory care staff as well as an intermittent lump in her right lower abdomen.    On exam patient is confused.  She tells me she is here for her broken hip.  She is oriented to self only.  She does not appear to have any pain.  With palpating her extremities, abdomen, back, and chest she does not grimace or express pain.  Abdomen is nondistended.  No palpable masses to the abdomen.  Oropharynx is a little dry.    Patient has a slightly elevated blood pressure otherwise vitally normal.  Patient does take Cozaar for this.  Unclear if she has received this today yet. No evidence to suggest this is acutely serious or life threatening.     Differentials include, but not limited to, UTI, abdominal pathology, hernia, dementia.    CBC, BMP, hepatic panel, and lipase collected.  No significant/emergent abnormalities.   Creatinine is slightly elevated at 1.12 (trending up from previous).   Lipase WNL.   EKG collected: Sinus rhythm with PACs. Rate 90. Age indeterminate anteroseptal infarct.     CT of the abdomen and pelvis collected: Radiologist did call and  report findings to Dr. Howell.  There is a right hip fracture appears to be acute/subacute.    At this point I called patient's nursing facility and spoke with the RN.  RN informs me that patient has fallen a couple of times including once on 8/29, 9/26, and 9/28.  Symptoms began over this last weekend.  RN notes that patient is needing more help with transfers.  She used to be a 1 assist, but is now needing at least 2.  She has been groaning a lot more than usual.  RN also notes that she had a couple of episodes of vomiting in the last couple of days, but none today.    Final assessment: Displaced right femoral neck fracture    Plan is to admit patient to the hospital for surgical intervention.    CT head and neck added on given reports of falls and the hip fracture.  These images do not reveal any acute/new findings.   Dedicated right pelvis and hip xrays completed at the request of ortho as well.     History:  Supplemental history from: Documented in chart, if applicable and Caregiver  External Record(s) reviewed: Documented in chart, if applicable.    Work Up:  Chart documentation includes differentials considered and any EKGs or imaging independently interpreted by provider, where specified.  In additional to work up documented, I considered the following work up: Documented in chart, if applicable.    External consultation:  Discussion of management with another provider: Orthopedics    Complicating factors:  Care impacted by chronic illness: Dementia  Care affected by social determinants of health: N/A    Disposition considerations: Admit.      FINAL IMPRESSION:    ICD-10-CM    1. Hip fracture, right, closed, initial encounter (H)  S72.001A             MEDICATIONS GIVEN IN THE EMERGENCY DEPARTMENT:  Medications   melatonin tablet 1 mg (has no administration in time range)   sodium chloride 0.9% infusion (has no administration in time range)   acetaminophen (TYLENOL) tablet 650 mg (has no administration in  time range)     Or   acetaminophen (TYLENOL) Suppository 650 mg (has no administration in time range)   HYDROmorphone (DILAUDID) half-tab 1 mg (has no administration in time range)   HYDROmorphone (DILAUDID) injection 0.2 mg (has no administration in time range)   ondansetron (ZOFRAN ODT) ODT tab 4 mg (has no administration in time range)     Or   ondansetron (ZOFRAN) injection 4 mg (has no administration in time range)   sodium chloride 0.9% BOLUS 1,000 mL (0 mLs Intravenous Stopped 10/4/23 1417)   iopamidol (ISOVUE-370) solution 66 mL (66 mLs Intravenous $Given 10/4/23 1306)         NEW PRESCRIPTIONS STARTED AT TODAY'S ED VISIT:  New Prescriptions    No medications on file       Discussed the results of all the tests and the disposition with patient and family/guardians.   All questions were answered.   The patient and/or family/guardian acknowledged understanding and was agreeable with the care plan.    HISTORY OF PRESENT ILLNESS   Patient information was obtained from: EMS report. History significantly limited by patient's advanced dementia.    Use of Intrepreter: N/A     Kim Ybarra is a 83 year old female with a pertinent history of Alzheimer's, hyperlipidemia, hypertension who presents to the ED by EMS for evaluation of increased confusion and an intermittent lump in the right lower quadrant of her abdomen noted by her memory care home staff.    Patient denies any pain.      MEDICAL HISTORY     Past Medical History:   Diagnosis Date    Poor historian        Past Surgical History:   Procedure Laterality Date    CLOSED REDUCTION, PERCUTANEOUS PINNING HIP Left 5/20/2023    Procedure: Closed reduction, percutaneous pinning left hip;  Surgeon: Lilly Rey MD;  Location: [facility] OR       History reviewed. No pertinent family history.    Social History     Tobacco Use    Smoking status: Never    Smokeless tobacco: Never   Substance Use Topics    Alcohol use: Not Currently    Drug use: Never       acetaminophen  (TYLENOL) 325 MG tablet  aspirin (ASA) 81 MG chewable tablet  glucosamine-chondroitin 500-400 MG CAPS per capsule  losartan (COZAAR) 50 MG tablet  multivitamin w/minerals (CERTAVITE/ANTIOXIDANTS) tablet  simvastatin (ZOCOR) 40 MG tablet        PHYSICAL EXAM     First Vitals:  Patient Vitals for the past 24 hrs:   BP Temp Temp src Pulse Resp SpO2   10/04/23 1400 (!) 160/71 -- -- 91 16 97 %   10/04/23 1200 137/65 -- -- 89 -- 96 %   10/04/23 1123 (!) 161/84 98.1  F (36.7  C) Oral 90 16 98 %         PHYSICAL EXAM:   Constitutional: No acute distress. Well developed and well nourished.   Eyes: PERRL. Conjunctivae clear b/l.   Mouth: Dry oropharynx.    Neck: FROM. Supple.   Cardio: Adequate perfusion. Regular rate. Regular rhythm. No murmurs.   Pulmonary: No respiratory distress. CTA b/l. Oxygenating well on RA.   Gastrointestinal: Bowel sounds present.  No palpable masses.  No palpable abdominal pain. No rebound or guarding.   Genitourinary: No suprapubic pain. No CVA tenderness.   Upper extremities: FROM. No edema.   Lower extremities: FROM. No edema.   Skin: Natural color. Warm. Dry. Intact. No bruising. No rashes. No diaphoresis.   Neuro: Baseline dementia. Oriented to person.   Psych: Calm and cooperative.       RESULTS     LAB:  All pertinent labs reviewed and interpreted  Labs Ordered and Resulted from Time of ED Arrival to Time of ED Departure   BASIC METABOLIC PANEL - Abnormal       Result Value    Sodium 133 (*)     Potassium 4.5      Chloride 100      Carbon Dioxide (CO2) 24      Anion Gap 9      Urea Nitrogen 24.5 (*)     Creatinine 1.12 (*)     GFR Estimate 49 (*)     Calcium 9.3      Glucose 102 (*)    CBC WITH PLATELETS AND DIFFERENTIAL - Abnormal    WBC Count 9.9      RBC Count 4.33      Hemoglobin 12.4      Hematocrit 36.8      MCV 85      MCH 28.6      MCHC 33.7      RDW 12.4      Platelet Count 238      % Neutrophils 74      % Lymphocytes 10      % Monocytes 14      Mids % (Monos, Eos, Basos)        %  Eosinophils 2      % Basophils 0      % Immature Granulocytes 0      NRBCs per 100 WBC 0      Absolute Neutrophils 7.3      Absolute Lymphocytes 0.9      Absolute Monocytes 1.4 (*)     Mids Abs (Monos, Eos, Basos)        Absolute Eosinophils 0.2      Absolute Basophils 0.0      Absolute Immature Granulocytes 0.0      Absolute NRBCs 0.0     HEPATIC FUNCTION PANEL - Abnormal    Protein Total 5.9 (*)     Albumin 2.8 (*)     Bilirubin Total 0.3      Alkaline Phosphatase 86      AST 19      ALT 22      Bilirubin Direct <0.20     INR - Abnormal    INR 1.22 (*)    LIPASE - Normal    Lipase 27     ROUTINE UA WITH MICROSCOPIC REFLEX TO CULTURE   PROCALCITONIN       RADIOLOGY:  Cervical spine CT w/o contrast   Final Result   IMPRESSION:   1.  No CT evidence for acute intracranial process.   2.  Brain atrophy and presumed chronic microvascular ischemic changes as above.      3.  1 cm benign parafalcine meningioma along the anterior right aspect of the falx as seen on previous CT scan.      4.  Degenerative change throughout the cervical spine with no evidence of fracture.      Head CT w/o contrast   Final Result   IMPRESSION:   1.  No CT evidence for acute intracranial process.   2.  Brain atrophy and presumed chronic microvascular ischemic changes as above.      3.  1 cm benign parafalcine meningioma along the anterior right aspect of the falx as seen on previous CT scan.      4.  Degenerative change throughout the cervical spine with no evidence of fracture.      CT Abdomen Pelvis w Contrast   Final Result   IMPRESSION:    1.  New mildly displaced right femoral neck fracture.      2.  Large amount of retained stool in the colon, which could indicate constipation. No acute inflammation or obstruction.      3.  Tiny left pleural effusion with associated airspace opacities.      Dr. Howell was contacted by me on 10/4/2023 1:31 PM CDT and verbalized understanding of the result.      Chest XR,  PA & LAT    (Results Pending)    XR Pelvis and Hip Right 2 Views    (Results Pending)       ECG:  EKG was collected and independently interpreted by myself and also confirmed by Dr. Howell.    Findings: Sinus rhythm with PACs. Rate 90. Age indeterminate anteroseptal infarct.     Comparisons: 3/12/2023 ECG PACs and infarct not present.        PROCEDURES     NA      Some or all of this documentation has been completed using dictation software and mild grammatical errors may be present. Please contact me with any concerns regarding this.       Chante Staley PA-C  Emergency Medicine   [facility] EMERGENCY DEPARTMENT

## 2023-10-04 NOTE — ED TRIAGE NOTES
Pt arrives to ER via [facility] from [facility] for evaluation of possible RLQ hernia and UTI. Staff states a mass intermittently appears in RLQ, not currently palpable. Pt also is confused at baseline, but staff says pt is more confused. VSS. GCS 14.      Triage Assessment       Row Name 10/04/23 1124       Triage Assessment (Adult)    Airway WDL WDL       Respiratory WDL    Respiratory WDL WDL       Skin Circulation/Temperature WDL    Skin Circulation/Temperature WDL WDL       Cardiac WDL    Cardiac WDL WDL       Peripheral/Neurovascular WDL    Peripheral Neurovascular WDL WDL       Cognitive/Neuro/Behavioral WDL    Cognitive/Neuro/Behavioral WDL X;speech;orientation    Level of Consciousness intermittent confusion    Arousal Level arouses to voice    Orientation disoriented to;time;situation    Speech word-finding difficulty;spontaneous    Mood/Behavior calm;cooperative       Pupils (CN II)    Pupil PERRLA yes       [place] Coma Scale    Best Eye Response 4-->(E4) spontaneous    Best Motor Response 6-->(M6) obeys commands    Best Verbal Response 4-->(V4) confused    [place] Coma Scale Score 14

## 2023-10-04 NOTE — ED NOTES
[facility] ED Handoff Report    ED Chief Complaint: Abdominal pain, confusion, fall    ED Diagnosis:  (S72.001A) Hip fracture, right, closed, initial encounter (H)  Plan: Case Request: HEMIARTHROPLASTY, HIP, BIPOLAR          PMH:    Past Medical History:   Diagnosis Date    Poor historian         Code Status:  Full Code     Falls Risk: Yes Band: Applied    Current Living Situation/Residence: lives in a skilled nursing facility in memory care unit    Elimination Status: Continent: No and wears briefs     Activity Level: Bed confined    Patients Preferred Language:  English     Needed: No    Vital Signs:  BP (!) 155/74   Pulse 92   Temp 98.1  F (36.7  C) (Oral)   Resp 16   LMP  (LMP Unknown)   SpO2 97%      Cardiac Rhythm: NSR w PAC    Pain Score: 1/10    Is the Patient Confused:  Yes    Last Food or Drink: 10/04/23 at 1400    Focused Assessment:  Pt arrived via EMS for evaluation of possible hernia and UTI, CT showed R hip fx, upon pt's daughter arriving to ER she states that pt had a fall last week that she knew about, pt denies pain then and states she is in little pain now, pt does not ambulate at baseline but is weaker per nursing staff    Tests Performed: Done: Labs and Imaging    Treatments Provided:  IVF and IV antbx    Family Dynamics/Concerns: No    Family Updated On Visitor Policy: Yes    Plan of Care Communicated to Family: Yes    Who Was Updated about Plan of Care: daughter    Belongings Checklist Done and Signed by Patient: No    Medications sent with patient: n/a    Covid: asymptomatic , not tested      RN: Agatha Bhandari RN 10/4/2023 4:18 PM

## 2023-10-04 NOTE — PLAN OF CARE
ORTHO PLAN OF CARE UPDATE    Patient details and imaging reviewed, demonstrating a displaced right femoral neck fracture.  Recommendation is for hemiarthroplasty to improve pain, mobility, and ease of care.    This has been discussed with the patient's family by the consulting PA, and family is interested in pursuing surgical care.  We will plan for this tomorrow pending medical clearance.  Patient is to be n.p.o. after midnight.  I have discussed the case with Dr. Feroz Oh, who has agreed to assume her care and perform the indicated procedure.    Ezra Santiago MD ................. 10/4/2023 3:40 PM  [facility]

## 2023-10-04 NOTE — H&P
Admission History and Physical   Kim Ybarra,    1940,   [id number]    [facility]   Hip fracture, right, closed, initial encounter (H) [S72.001A]    PCP: Sveta Emanuel,    Code status:  Full Code       Extended Emergency Contact Information  Primary Emergency Contact: Alex Ybarra  Mobile Phone: [phone]  Relation: Daughter       Principal Problem:    Hip fracture, right, closed, initial encounter (H)       ASSESSMENT AND PLAN:  Acute right hip fracture, history of recurrent falls  CT scan showing significant constipation, laxative started  CT abdomen showing airspace opacities, obtain formal chest x-ray  History of dementia resides in a care facility  Increasing confusion at nursing facility, check UA, check procalcitonin  Benign essential hypertension  CKD stage III, avoid nephrotoxins, BMP daily      Plan  Admit to medicine  Head and neck CT unremarkable for stroke or acute fractures  Orthopedics consulted, plan surgical intervention in a.m.  Home medication resumed as appropriate  As needed analgesia started  Nursing informed of falls precaution ordered  Begin senna and MiraLAX for constipation  Check procalcitonin empiric antibiotics for airspace opacities, chest x-ray pending      Full code for now until family available to provide any further information      Preop evaluation  Patient has a history of recurrent falls and recently had surgery mid  of this year for similar complaints.  She was medically optimized at that time with no perioperative complications.  She is therefore optimized if surgery is contemplated by orthopedics during this hospital stay      DVT PPX:  Mechanical    Barriers to discharge    Anticipated Discharge date inpatient multiple days          Chief Complaint:  Recurrent falls at nursing facility  Patient with dementia unable to provide adequate history    HPI:  Kim Ybarra is a 83 year old old female initially brought to ED for evaluation of abdominal  lump.  CT scan here showed incidental right hip fracture.  ED then obtained further information from nursing facility and were informed of patient having history of recurrent falls.  No reports of head injury, no loss of consciousness, no chest pain, no other secondary injuries.  Patient unable provide history but denies any pain, ED then ordered further evaluation including head and neck CT which is currently pending.  Orthopedics was consulted and she is admitted to medicine for evaluation.      Medical History  Past Medical History:   Diagnosis Date    Poor historian           Surgical History  She  has a past surgical history that includes Closed reduction, percutaneous pinning hip (Left, 5/20/2023).      SOCIAL HISTORY:  Social History     Socioeconomic History    Marital status: Single     Spouse name: Not on file    Number of children: Not on file    Years of education: Not on file    Highest education level: Not on file   Occupational History    Not on file   Tobacco Use    Smoking status: Never    Smokeless tobacco: Never   Substance and Sexual Activity    Alcohol use: Not Currently    Drug use: Never    Sexual activity: Not on file   Other Topics Concern    Not on file   Social History Narrative    Not on file     Social Determinants of Health     Financial Resource Strain: Not on file   Food Insecurity: Not on file   Transportation Needs: Not on file   Physical Activity: Not on file   Stress: Not on file   Social Connections: Not on file   Interpersonal Safety: Not on file   Housing Stability: Not on file       FAMILY HISTORY:  History reviewed. No pertinent family history.      ALLERGIES:  Allergies   Allergen Reactions    Other Food Allergy Unknown     Beef       MEDICATIONS: See pharmacy note        ROS:  12 point review of systems reviewed and is negative except as stated above      PHYSICAL EXAM:  BP (!) 160/71   Pulse 91   Temp 98.1  F (36.7  C) (Oral)   Resp 16   LMP  (LMP Unknown)   SpO2 97%      General: Patient is pleasantly confused  HEENT: Pupils equal and reactive,ENT WNL   Neck- supple, No JVP elevation, lymphadenopathy or thyromegaly. Trachea-central.  Chest: Clear to auscultation bilaterally.  Heart: S1S2 regular. No M/R/G.  Abdomen: Soft. NT, ND. No organomegaly. Bowel sounds- active.  Back: No spine tenderness. No CVA tenderness.  Extremities: No leg swelling. Peripheral pulses 2+ bilaterally.  Neuro: No focal neurological deficit  Skin: no skin rashes     DIAGNOSTIC DATA: Case discussed extensively ED physician      EKG Results: Personally reviewed no acute ischemia        Advanced Care Planning       Sami Root MD

## 2023-10-04 NOTE — CONSULTS
ORTHOPEDIC CONSULTATION    Consultation  Kim Ybarra,  1940, MRN [id number]    Hip fracture, right, closed, initial encounter (H) [S72.001A]    PCP: Sveta Emanuel, [phone]   Code status:  Prior       Extended Emergency Contact Information  Primary Emergency Contact: Alex Ybarra  Mobile Phone: [phone]  Relation: Daughter         IMPRESSION:  Mildly displaced right femoral neck fracture     PLAN:  This patient was discussed with Dr. Santiago, on-call surgeon for [facility] and they are in agreement with the following plan.   - Xray shows a displaced femoral neck fracture. Discussed treatment options including surgical intervention.  Likely the patient would benefit from a hemiarthroplasty. Discussed risks of procedure including but not limited to, injury to nerves, arteries or veins, malunion, nonunion, periprosthetic fracture, DVT or even death with the patient and they are in agreement with proceeding.  Unfortunately patient does have dementia and typically lives in a memory care unit.  She likely would not be appropriate for consenting for the procedure and we will have to discuss with family as well.  There is no family at bedside however I was able to speak with daughter POA who is willing to agree to proceed with surgery  - Will take to the OR tomorrow due to OR availability. Type & Screen completed.  - Contacted anesthesia & they will place block prior to surgery for improved pain control.   - NPO at midnight tonight  - Medical optimization per Hospitalist. Hgb 12.4. INR 1.22.  - Pain control. Currently well controlled on IV dilaudid.   -Hold all anticoagulation including aspirin  - NWB Left lower extremity.    Thank you for including [facility] in the care of Kim Ybarra. It has been a pleasure participating in their care.      CHIEF COMPLAINT: Right hip fracture      HISTORY OF PRESENT ILLNESS:  The patient is seen in orthopedic consultation at the request of Dr. Howell,  Ruy HA MD.  The patient is a 83 year old female with c/o right hip pain.  They presented to the ED with concern due to increasing confusion.  Patient lives at a memory care unit and has dementia at baseline.  Patient is a poor historian and during questioning mentions her broken hip however she seems to be referring to her left hip which has been fixed in the past.  She does not note any right hip pain to me at this time however conversation with nursing home staff indicates that she has had increased need for assist over the last week or so.  She has had multiple recent falls on a/29, 9/26, 9/28 per ED note.  At this time she denies any other symptoms to the right lower extremity such as pain radiating through the leg or numbness/tingling.  She denies any use of anticoagulation, however medical record indicates aspirin use    PAST MEDICAL HISTORY:     Past Medical History:   Diagnosis Date    Poor historian        ALLERGIES:   Other food allergy    MEDICATIONS ON ADMISSION:  Medications were reviewed.  They do include:   (Not in a hospital admission)      SOCIAL HISTORY:    reports that she has never smoked. She has never used smokeless tobacco. She reports that she does not currently use alcohol. She reports that she does not use drugs.     FAMILY HISTORY:  History reviewed. No pertinent family history.    REVIEW OF SYSTEMS:   See Admission History and Physical     PHYSICAL EXAMINATION:  General: On examination, the patient is resting comfortably, NAD and awake, lying supine in bed and oriented to person, place and time   SKIN: No ecchymosis noted over right hip, skin intact  Pulses:  Palpable bilateral dorsalis pedis pulses.  Sensation: intact and equal bilaterally to the distal lower extremities, feet and toes.  Tenderness: No tenderness of the right hip noted . No tenderness noted in calfs bilaterally  ROM: Equal ankle plantar and dorsiflexion, moves all toes bilaterally. Unable to lift leg off bed due to  pain  Motor:  +5/5 ankle DF, PF, EHL bilaterally.  Right hip and knee not assessed due to fracture. Contralateral hip flexion and knee extension +5/5.   Extremity: Right lower extremity externally rotated & shortened, thigh & calf compartments soft    Temp:  [98.1  F (36.7  C)] 98.1  F (36.7  C)  Pulse:  [89-91] 91  Resp:  [16] 16  BP: (137-161)/(65-84) 160/71  SpO2:  [96 %-98 %] 97 %    RADIOGRAPHIC EVALUATION:  Right hip CT scan personally reviewed.  Pending dedicated right hip radiographs  EXAM: CT ABDOMEN PELVIS W CONTRAST  LOCATION: [facility]  DATE: 10/4/2023     INDICATION: Dementia, reported abdominal pain from care facility  COMPARISON: 5/20/2023  TECHNIQUE: CT scan of the abdomen and pelvis was performed following injection of IV contrast. Multiplanar reformats were obtained. Dose reduction techniques were used.  CONTRAST: 66 mL Isovue 370     FINDINGS:   LOWER CHEST: Trace right pleural effusion with associated airspace opacities. Partially visualized coronary artery calcifications.     HEPATOBILIARY: No significant mass or bile duct dilatation. No calcified gallstones.      PANCREAS: Normal.     SPLEEN: Punctate calcified granulomas.     ADRENAL GLANDS: Normal.     KIDNEYS/BLADDER: Unchanged mild left pelvicalyceal fullness with no obstructing stone or lesion. Normal right kidney and bladder.     BOWEL: Diverticulosis of the colon. No acute inflammatory change. No obstruction. Large amount stool throughout the colon.     LYMPH NODES: Normal.     VASCULATURE: No abdominal aortic aneurysm.     PELVIC ORGANS: Coarse calcified fibroid.     MUSCULOSKELETAL: Left cannulated screws. Mildly displaced and angulated right femoral neck fracture, new since 5/20/2023.                                                                      IMPRESSION:   1.  New mildly displaced right femoral neck fracture.     2.  Large amount of retained stool in the colon, which could indicate constipation. No  acute inflammation or obstruction.     3.  Tiny left pleural effusion with associated airspace opacities.    LABORATORY DATA:     Lab Results   Component Value Date    INR 1.10 05/20/2023     Lab Results   Component Value Date    WBC 9.9 10/04/2023     Lab Results   Component Value Date    RBC 4.33 10/04/2023     Lab Results   Component Value Date    HGB 12.4 10/04/2023     Lab Results   Component Value Date    HCT 36.8 10/04/2023     No components found for: MCT  Lab Results   Component Value Date    MCV 85 10/04/2023     Lab Results   Component Value Date    MCH 28.6 10/04/2023     Lab Results   Component Value Date    MCHC 33.7 10/04/2023     Lab Results   Component Value Date    RDW 12.4 10/04/2023     Lab Results   Component Value Date     10/04/2023         Primo Valentine PA-C/Dr. Santiago  [facility]

## 2023-10-04 NOTE — ED NOTES
Bed: Pending sale to [facility]  Expected date:   Expected time:   Means of arrival:   Comments:  Mplwd, 83F, UTI/hernia

## 2023-10-05 ENCOUNTER — ANESTHESIA (OUTPATIENT)
Dept: SURGERY | Facility: HOSPITAL | Age: 83
DRG: 522 | End: 2023-10-05
Payer: COMMERCIAL

## 2023-10-05 ENCOUNTER — ANESTHESIA EVENT (OUTPATIENT)
Dept: SURGERY | Facility: HOSPITAL | Age: 83
DRG: 522 | End: 2023-10-05
Payer: COMMERCIAL

## 2023-10-05 ENCOUNTER — APPOINTMENT (OUTPATIENT)
Dept: RADIOLOGY | Facility: HOSPITAL | Age: 83
DRG: 522 | End: 2023-10-05
Attending: STUDENT IN AN ORGANIZED HEALTH CARE EDUCATION/TRAINING PROGRAM
Payer: COMMERCIAL

## 2023-10-05 LAB
ANION GAP SERPL CALCULATED.3IONS-SCNC: 9 MMOL/L (ref 7–15)
BUN SERPL-MCNC: 20.9 MG/DL (ref 8–23)
CALCIUM SERPL-MCNC: 9.7 MG/DL (ref 8.8–10.2)
CHLORIDE SERPL-SCNC: 105 MMOL/L (ref 98–107)
CREAT SERPL-MCNC: 1.06 MG/DL (ref 0.51–0.95)
DEPRECATED HCO3 PLAS-SCNC: 22 MMOL/L (ref 22–29)
EGFRCR SERPLBLD CKD-EPI 2021: 52 ML/MIN/1.73M2
ERYTHROCYTE [DISTWIDTH] IN BLOOD BY AUTOMATED COUNT: 12.3 % (ref 10–15)
GLUCOSE SERPL-MCNC: 87 MG/DL (ref 70–99)
HCT VFR BLD AUTO: 35.7 % (ref 35–47)
HGB BLD-MCNC: 11.6 G/DL (ref 11.7–15.7)
MCH RBC QN AUTO: 28.2 PG (ref 26.5–33)
MCHC RBC AUTO-ENTMCNC: 32.5 G/DL (ref 31.5–36.5)
MCV RBC AUTO: 87 FL (ref 78–100)
PLATELET # BLD AUTO: 242 10E3/UL (ref 150–450)
POTASSIUM SERPL-SCNC: 4.3 MMOL/L (ref 3.4–5.3)
RBC # BLD AUTO: 4.11 10E6/UL (ref 3.8–5.2)
SODIUM SERPL-SCNC: 136 MMOL/L (ref 135–145)
WBC # BLD AUTO: 8.2 10E3/UL (ref 4–11)

## 2023-10-05 PROCEDURE — 36415 COLL VENOUS BLD VENIPUNCTURE: CPT | Performed by: INTERNAL MEDICINE

## 2023-10-05 PROCEDURE — 80048 BASIC METABOLIC PNL TOTAL CA: CPT | Performed by: INTERNAL MEDICINE

## 2023-10-05 PROCEDURE — 250N000025 HC SEVOFLURANE, PER MIN: Performed by: STUDENT IN AN ORGANIZED HEALTH CARE EDUCATION/TRAINING PROGRAM

## 2023-10-05 PROCEDURE — 250N000013 HC RX MED GY IP 250 OP 250 PS 637: Performed by: PHYSICIAN ASSISTANT

## 2023-10-05 PROCEDURE — 250N000013 HC RX MED GY IP 250 OP 250 PS 637: Performed by: INTERNAL MEDICINE

## 2023-10-05 PROCEDURE — 710N000009 HC RECOVERY PHASE 1, LEVEL 1, PER MIN: Performed by: STUDENT IN AN ORGANIZED HEALTH CARE EDUCATION/TRAINING PROGRAM

## 2023-10-05 PROCEDURE — 999N000141 HC STATISTIC PRE-PROCEDURE NURSING ASSESSMENT: Performed by: STUDENT IN AN ORGANIZED HEALTH CARE EDUCATION/TRAINING PROGRAM

## 2023-10-05 PROCEDURE — 73552 X-RAY EXAM OF FEMUR 2/>: CPT | Mod: RT

## 2023-10-05 PROCEDURE — 250N000011 HC RX IP 250 OP 636: Performed by: PHYSICIAN ASSISTANT

## 2023-10-05 PROCEDURE — C1713 ANCHOR/SCREW BN/BN,TIS/BN: HCPCS | Performed by: STUDENT IN AN ORGANIZED HEALTH CARE EDUCATION/TRAINING PROGRAM

## 2023-10-05 PROCEDURE — 120N000001 HC R&B MED SURG/OB

## 2023-10-05 PROCEDURE — 370N000017 HC ANESTHESIA TECHNICAL FEE, PER MIN: Performed by: STUDENT IN AN ORGANIZED HEALTH CARE EDUCATION/TRAINING PROGRAM

## 2023-10-05 PROCEDURE — 258N000003 HC RX IP 258 OP 636: Performed by: NURSE ANESTHETIST, CERTIFIED REGISTERED

## 2023-10-05 PROCEDURE — 258N000003 HC RX IP 258 OP 636: Performed by: STUDENT IN AN ORGANIZED HEALTH CARE EDUCATION/TRAINING PROGRAM

## 2023-10-05 PROCEDURE — 250N000011 HC RX IP 250 OP 636: Mod: JZ | Performed by: NURSE ANESTHETIST, CERTIFIED REGISTERED

## 2023-10-05 PROCEDURE — 72170 X-RAY EXAM OF PELVIS: CPT

## 2023-10-05 PROCEDURE — 85027 COMPLETE CBC AUTOMATED: CPT | Performed by: INTERNAL MEDICINE

## 2023-10-05 PROCEDURE — 250N000011 HC RX IP 250 OP 636: Performed by: STUDENT IN AN ORGANIZED HEALTH CARE EDUCATION/TRAINING PROGRAM

## 2023-10-05 PROCEDURE — 258N000003 HC RX IP 258 OP 636: Performed by: PHYSICIAN ASSISTANT

## 2023-10-05 PROCEDURE — 360N000077 HC SURGERY LEVEL 4, PER MIN: Performed by: STUDENT IN AN ORGANIZED HEALTH CARE EDUCATION/TRAINING PROGRAM

## 2023-10-05 PROCEDURE — 272N000001 HC OR GENERAL SUPPLY STERILE: Performed by: STUDENT IN AN ORGANIZED HEALTH CARE EDUCATION/TRAINING PROGRAM

## 2023-10-05 PROCEDURE — 0SRR019 REPLACEMENT OF RIGHT HIP JOINT, FEMORAL SURFACE WITH METAL SYNTHETIC SUBSTITUTE, CEMENTED, OPEN APPROACH: ICD-10-PCS | Performed by: STUDENT IN AN ORGANIZED HEALTH CARE EDUCATION/TRAINING PROGRAM

## 2023-10-05 PROCEDURE — 250N000009 HC RX 250: Performed by: NURSE ANESTHETIST, CERTIFIED REGISTERED

## 2023-10-05 PROCEDURE — C1776 JOINT DEVICE (IMPLANTABLE): HCPCS | Performed by: STUDENT IN AN ORGANIZED HEALTH CARE EDUCATION/TRAINING PROGRAM

## 2023-10-05 PROCEDURE — 99232 SBSQ HOSP IP/OBS MODERATE 35: CPT | Performed by: HOSPITALIST

## 2023-10-05 DEVICE — ARTICUL/EZE FEMORAL HEAD DIAMETER 28MM +5 12/14 TAPER
Type: IMPLANTABLE DEVICE | Site: HIP | Status: FUNCTIONAL
Brand: ARTICUL/EZE

## 2023-10-05 DEVICE — SUMMIT FEMORAL STEM 12/14 TAPER CEMENTED SIZE 3 STD 108MM
Type: IMPLANTABLE DEVICE | Site: HIP | Status: FUNCTIONAL
Brand: SUMMIT

## 2023-10-05 DEVICE — PLUG CEMENT BUCK W/INSERT 18.5 71279422: Type: IMPLANTABLE DEVICE | Site: HIP | Status: FUNCTIONAL

## 2023-10-05 DEVICE — BONE CEMENT SIMPLEX W/TOBRAMYCIN 6197-9-001: Type: IMPLANTABLE DEVICE | Site: HIP | Status: FUNCTIONAL

## 2023-10-05 DEVICE — SELF CENTERING BI-POLAR HEAD 28MM ID 47MM OD
Type: IMPLANTABLE DEVICE | Site: HIP | Status: FUNCTIONAL
Brand: SELF CENTERING

## 2023-10-05 DEVICE — CEMENTRALIZER STEM CENTRALIZER 9.25MM CEMENTED
Type: IMPLANTABLE DEVICE | Site: HIP | Status: FUNCTIONAL
Brand: CEMENTRALIZER

## 2023-10-05 RX ORDER — FENTANYL CITRATE 50 UG/ML
50 INJECTION, SOLUTION INTRAMUSCULAR; INTRAVENOUS EVERY 5 MIN PRN
Status: DISCONTINUED | OUTPATIENT
Start: 2023-10-05 | End: 2023-10-05 | Stop reason: HOSPADM

## 2023-10-05 RX ORDER — ONDANSETRON 2 MG/ML
4 INJECTION INTRAMUSCULAR; INTRAVENOUS EVERY 30 MIN PRN
Status: DISCONTINUED | OUTPATIENT
Start: 2023-10-05 | End: 2023-10-05 | Stop reason: HOSPADM

## 2023-10-05 RX ORDER — SODIUM CHLORIDE, SODIUM LACTATE, POTASSIUM CHLORIDE, CALCIUM CHLORIDE 600; 310; 30; 20 MG/100ML; MG/100ML; MG/100ML; MG/100ML
INJECTION, SOLUTION INTRAVENOUS CONTINUOUS
Status: DISCONTINUED | OUTPATIENT
Start: 2023-10-05 | End: 2023-10-05 | Stop reason: HOSPADM

## 2023-10-05 RX ORDER — ONDANSETRON 2 MG/ML
INJECTION INTRAMUSCULAR; INTRAVENOUS PRN
Status: DISCONTINUED | OUTPATIENT
Start: 2023-10-05 | End: 2023-10-05

## 2023-10-05 RX ORDER — FENTANYL CITRATE 50 UG/ML
100 INJECTION, SOLUTION INTRAMUSCULAR; INTRAVENOUS
Status: DISCONTINUED | OUTPATIENT
Start: 2023-10-05 | End: 2023-10-05 | Stop reason: HOSPADM

## 2023-10-05 RX ORDER — ONDANSETRON 2 MG/ML
4 INJECTION INTRAMUSCULAR; INTRAVENOUS EVERY 6 HOURS PRN
Status: DISCONTINUED | OUTPATIENT
Start: 2023-10-05 | End: 2023-10-09 | Stop reason: HOSPADM

## 2023-10-05 RX ORDER — LIDOCAINE 40 MG/G
CREAM TOPICAL
Status: DISCONTINUED | OUTPATIENT
Start: 2023-10-05 | End: 2023-10-05 | Stop reason: HOSPADM

## 2023-10-05 RX ORDER — OXYCODONE HYDROCHLORIDE 5 MG/1
5 TABLET ORAL
Status: DISCONTINUED | OUTPATIENT
Start: 2023-10-05 | End: 2023-10-05 | Stop reason: HOSPADM

## 2023-10-05 RX ORDER — FENTANYL CITRATE 50 UG/ML
25 INJECTION, SOLUTION INTRAMUSCULAR; INTRAVENOUS EVERY 5 MIN PRN
Status: DISCONTINUED | OUTPATIENT
Start: 2023-10-05 | End: 2023-10-05 | Stop reason: HOSPADM

## 2023-10-05 RX ORDER — SODIUM CHLORIDE, SODIUM LACTATE, POTASSIUM CHLORIDE, CALCIUM CHLORIDE 600; 310; 30; 20 MG/100ML; MG/100ML; MG/100ML; MG/100ML
INJECTION, SOLUTION INTRAVENOUS CONTINUOUS
Status: DISCONTINUED | OUTPATIENT
Start: 2023-10-05 | End: 2023-10-07

## 2023-10-05 RX ORDER — CEFAZOLIN SODIUM/WATER 2 G/20 ML
SYRINGE (ML) INTRAVENOUS PRN
Status: DISCONTINUED | OUTPATIENT
Start: 2023-10-05 | End: 2023-10-05

## 2023-10-05 RX ORDER — OXYCODONE HYDROCHLORIDE 5 MG/1
10 TABLET ORAL
Status: DISCONTINUED | OUTPATIENT
Start: 2023-10-05 | End: 2023-10-05 | Stop reason: HOSPADM

## 2023-10-05 RX ORDER — PROPOFOL 10 MG/ML
INJECTION, EMULSION INTRAVENOUS PRN
Status: DISCONTINUED | OUTPATIENT
Start: 2023-10-05 | End: 2023-10-05

## 2023-10-05 RX ORDER — AMOXICILLIN 250 MG
1 CAPSULE ORAL 2 TIMES DAILY
Status: DISCONTINUED | OUTPATIENT
Start: 2023-10-05 | End: 2023-10-06

## 2023-10-05 RX ORDER — LIDOCAINE 40 MG/G
CREAM TOPICAL
Status: DISCONTINUED | OUTPATIENT
Start: 2023-10-05 | End: 2023-10-09 | Stop reason: HOSPADM

## 2023-10-05 RX ORDER — BISACODYL 10 MG
10 SUPPOSITORY, RECTAL RECTAL DAILY PRN
Status: DISCONTINUED | OUTPATIENT
Start: 2023-10-05 | End: 2023-10-09 | Stop reason: HOSPADM

## 2023-10-05 RX ORDER — KETAMINE HYDROCHLORIDE 10 MG/ML
INJECTION INTRAMUSCULAR; INTRAVENOUS PRN
Status: DISCONTINUED | OUTPATIENT
Start: 2023-10-05 | End: 2023-10-05

## 2023-10-05 RX ORDER — DEXAMETHASONE SODIUM PHOSPHATE 10 MG/ML
INJECTION, SOLUTION INTRAMUSCULAR; INTRAVENOUS PRN
Status: DISCONTINUED | OUTPATIENT
Start: 2023-10-05 | End: 2023-10-05

## 2023-10-05 RX ORDER — CEFAZOLIN SODIUM 1 G/3ML
1 INJECTION, POWDER, FOR SOLUTION INTRAMUSCULAR; INTRAVENOUS EVERY 8 HOURS
Status: COMPLETED | OUTPATIENT
Start: 2023-10-05 | End: 2023-10-06

## 2023-10-05 RX ORDER — ACETAMINOPHEN 325 MG/1
650 TABLET ORAL EVERY 4 HOURS PRN
Status: DISCONTINUED | OUTPATIENT
Start: 2023-10-08 | End: 2023-10-09 | Stop reason: HOSPADM

## 2023-10-05 RX ORDER — ONDANSETRON 4 MG/1
4 TABLET, ORALLY DISINTEGRATING ORAL EVERY 30 MIN PRN
Status: DISCONTINUED | OUTPATIENT
Start: 2023-10-05 | End: 2023-10-05 | Stop reason: HOSPADM

## 2023-10-05 RX ORDER — ACETAMINOPHEN 325 MG/1
975 TABLET ORAL EVERY 8 HOURS
Status: COMPLETED | OUTPATIENT
Start: 2023-10-05 | End: 2023-10-08

## 2023-10-05 RX ORDER — POLYETHYLENE GLYCOL 3350 17 G/17G
17 POWDER, FOR SOLUTION ORAL DAILY
Status: DISCONTINUED | OUTPATIENT
Start: 2023-10-06 | End: 2023-10-09 | Stop reason: HOSPADM

## 2023-10-05 RX ORDER — HYDROMORPHONE HCL IN WATER/PF 6 MG/30 ML
0.2 PATIENT CONTROLLED ANALGESIA SYRINGE INTRAVENOUS
Status: DISCONTINUED | OUTPATIENT
Start: 2023-10-05 | End: 2023-10-09 | Stop reason: HOSPADM

## 2023-10-05 RX ORDER — SODIUM CHLORIDE, SODIUM LACTATE, POTASSIUM CHLORIDE, CALCIUM CHLORIDE 600; 310; 30; 20 MG/100ML; MG/100ML; MG/100ML; MG/100ML
INJECTION, SOLUTION INTRAVENOUS CONTINUOUS PRN
Status: DISCONTINUED | OUTPATIENT
Start: 2023-10-05 | End: 2023-10-05

## 2023-10-05 RX ORDER — PROCHLORPERAZINE MALEATE 5 MG
5 TABLET ORAL EVERY 6 HOURS PRN
Status: DISCONTINUED | OUTPATIENT
Start: 2023-10-05 | End: 2023-10-09 | Stop reason: HOSPADM

## 2023-10-05 RX ORDER — FENTANYL CITRATE 50 UG/ML
INJECTION, SOLUTION INTRAMUSCULAR; INTRAVENOUS PRN
Status: DISCONTINUED | OUTPATIENT
Start: 2023-10-05 | End: 2023-10-05

## 2023-10-05 RX ORDER — ASPIRIN 81 MG/1
81 TABLET ORAL 2 TIMES DAILY
Status: DISCONTINUED | OUTPATIENT
Start: 2023-10-05 | End: 2023-10-09 | Stop reason: HOSPADM

## 2023-10-05 RX ORDER — OXYCODONE HYDROCHLORIDE 5 MG/1
5 TABLET ORAL EVERY 4 HOURS PRN
Status: DISCONTINUED | OUTPATIENT
Start: 2023-10-05 | End: 2023-10-09 | Stop reason: HOSPADM

## 2023-10-05 RX ORDER — ONDANSETRON 4 MG/1
4 TABLET, ORALLY DISINTEGRATING ORAL EVERY 6 HOURS PRN
Status: DISCONTINUED | OUTPATIENT
Start: 2023-10-05 | End: 2023-10-09 | Stop reason: HOSPADM

## 2023-10-05 RX ORDER — BUPIVACAINE HYDROCHLORIDE 5 MG/ML
INJECTION, SOLUTION EPIDURAL; INTRACAUDAL
Status: COMPLETED | OUTPATIENT
Start: 2023-10-05 | End: 2023-10-05

## 2023-10-05 RX ORDER — LIDOCAINE HYDROCHLORIDE 10 MG/ML
INJECTION, SOLUTION INFILTRATION; PERINEURAL PRN
Status: DISCONTINUED | OUTPATIENT
Start: 2023-10-05 | End: 2023-10-05

## 2023-10-05 RX ORDER — HYDROMORPHONE HCL IN WATER/PF 6 MG/30 ML
0.1 PATIENT CONTROLLED ANALGESIA SYRINGE INTRAVENOUS
Status: DISCONTINUED | OUTPATIENT
Start: 2023-10-05 | End: 2023-10-09 | Stop reason: HOSPADM

## 2023-10-05 RX ADMIN — ACETAMINOPHEN 650 MG: 325 TABLET ORAL at 08:18

## 2023-10-05 RX ADMIN — FENTANYL CITRATE 50 MCG: 50 INJECTION INTRAMUSCULAR; INTRAVENOUS at 13:29

## 2023-10-05 RX ADMIN — FENTANYL CITRATE 50 MCG: 50 INJECTION INTRAMUSCULAR; INTRAVENOUS at 12:56

## 2023-10-05 RX ADMIN — ROCURONIUM BROMIDE 10 MG: 50 INJECTION, SOLUTION INTRAVENOUS at 13:30

## 2023-10-05 RX ADMIN — SENNOSIDES 8.6 MG: 8.6 TABLET, FILM COATED ORAL at 08:03

## 2023-10-05 RX ADMIN — SODIUM CHLORIDE, POTASSIUM CHLORIDE, SODIUM LACTATE AND CALCIUM CHLORIDE: 600; 310; 30; 20 INJECTION, SOLUTION INTRAVENOUS at 12:52

## 2023-10-05 RX ADMIN — PROPOFOL 120 MG: 10 INJECTION, EMULSION INTRAVENOUS at 12:56

## 2023-10-05 RX ADMIN — SIMVASTATIN 40 MG: 10 TABLET, FILM COATED ORAL at 08:02

## 2023-10-05 RX ADMIN — BUPIVACAINE HYDROCHLORIDE 10 ML: 5 INJECTION, SOLUTION EPIDURAL; INTRACAUDAL; PERINEURAL at 13:03

## 2023-10-05 RX ADMIN — LIDOCAINE HYDROCHLORIDE 30 MG: 10 INJECTION, SOLUTION INFILTRATION; PERINEURAL at 12:56

## 2023-10-05 RX ADMIN — KETAMINE HYDROCHLORIDE 20 MG: 10 INJECTION INTRAMUSCULAR; INTRAVENOUS at 12:52

## 2023-10-05 RX ADMIN — DEXAMETHASONE SODIUM PHOSPHATE 5 MG: 10 INJECTION, SOLUTION INTRAMUSCULAR; INTRAVENOUS at 12:56

## 2023-10-05 RX ADMIN — FENTANYL CITRATE 50 MCG: 50 INJECTION INTRAMUSCULAR; INTRAVENOUS at 13:56

## 2023-10-05 RX ADMIN — ROCURONIUM BROMIDE 40 MG: 50 INJECTION, SOLUTION INTRAVENOUS at 12:56

## 2023-10-05 RX ADMIN — SENNOSIDES AND DOCUSATE SODIUM 1 TABLET: 50; 8.6 TABLET ORAL at 20:51

## 2023-10-05 RX ADMIN — FENTANYL CITRATE 50 MCG: 50 INJECTION INTRAMUSCULAR; INTRAVENOUS at 13:45

## 2023-10-05 RX ADMIN — Medication 81 MG: at 20:51

## 2023-10-05 RX ADMIN — POLYETHYLENE GLYCOL 3350 17 G: 17 POWDER, FOR SOLUTION ORAL at 08:03

## 2023-10-05 RX ADMIN — SODIUM CHLORIDE, POTASSIUM CHLORIDE, SODIUM LACTATE AND CALCIUM CHLORIDE: 600; 310; 30; 20 INJECTION, SOLUTION INTRAVENOUS at 20:54

## 2023-10-05 RX ADMIN — Medication 2 G: at 13:07

## 2023-10-05 RX ADMIN — SODIUM CHLORIDE, POTASSIUM CHLORIDE, SODIUM LACTATE AND CALCIUM CHLORIDE: 600; 310; 30; 20 INJECTION, SOLUTION INTRAVENOUS at 12:32

## 2023-10-05 RX ADMIN — ROCURONIUM BROMIDE 10 MG: 50 INJECTION, SOLUTION INTRAVENOUS at 14:03

## 2023-10-05 RX ADMIN — LOSARTAN POTASSIUM 50 MG: 50 TABLET, FILM COATED ORAL at 08:02

## 2023-10-05 RX ADMIN — SUGAMMADEX 150 MG: 100 INJECTION, SOLUTION INTRAVENOUS at 15:07

## 2023-10-05 RX ADMIN — PHENYLEPHRINE HYDROCHLORIDE 200 MCG: 10 INJECTION INTRAVENOUS at 13:07

## 2023-10-05 RX ADMIN — CEFAZOLIN 1 G: 1 INJECTION, POWDER, FOR SOLUTION INTRAMUSCULAR; INTRAVENOUS at 20:50

## 2023-10-05 RX ADMIN — ONDANSETRON 4 MG: 2 INJECTION INTRAMUSCULAR; INTRAVENOUS at 14:47

## 2023-10-05 ASSESSMENT — ACTIVITIES OF DAILY LIVING (ADL)
ADLS_ACUITY_SCORE: 68
ADLS_ACUITY_SCORE: 60
ADLS_ACUITY_SCORE: 68
ADLS_ACUITY_SCORE: 60
ADLS_ACUITY_SCORE: 62
ADLS_ACUITY_SCORE: 70
ADLS_ACUITY_SCORE: 54
ADLS_ACUITY_SCORE: 68
ADLS_ACUITY_SCORE: 68
ADLS_ACUITY_SCORE: 70

## 2023-10-05 NOTE — BRIEF OP NOTE
[facility]    Brief Operative Note    Pre-operative diagnosis: Hip fracture, right, closed, initial encounter (H) [S72.001A]  Post-operative diagnosis Same as pre-operative diagnosis    Procedure: HEMIARTHROPLASTY, HIP, BIPOLAR, Right - Hip    Surgeon: Surgeon(s) and Role:     * Feroz Oh MD - Primary  Anesthesia: Choice with Block   Estimated Blood Loss: 150 mL    Drains: None  Specimens:   ID Type Source Tests Collected by Time Destination   A : RIGHT FEMORAL HEAD OR DOCUMENTATION ONLY Tissue Hip, Right OR DOCUMENTATION ONLY Feroz Oh MD 10/5/2023  1:52 PM      Findings:   Intact labrum and acetabular cartilage. Significant osteopenia. No distal propagation of fracture .  Complications: None.  Implants:   Implant Name Type Inv. Item Serial No.  Lot No. LRB No. Used Action   BONE CEMENT SIMPLEX W/TOBRAMYCIN 6197-9-001 - [id number] Cement, Bone BONE CEMENT SIMPLEX W/TOBRAMYCIN 6197-9-001  [employer]  Right 1 Implanted   BONE CEMENT SIMPLEX W/TOBRAMYCIN 6197-9-001 - [id number] Cement, Bone BONE CEMENT SIMPLEX W/TOBRAMYCIN 6197-9-001  [employer]  Right 1 Implanted   PLUG CEMENT LOPEZ W/INSERT 18.5 [id number] - [id number] Total Joint Component/Insert PLUG CEMENT LOPEZ W/INSERT 18.5 [id number]  [employer]  Right 1 Implanted   IMP STEM CENTRALIZER DEPUY 9.25MM 1376- - [id number] Total Joint Component/Insert IMP STEM CENTRALIZER DEPUY 9.25MM 1376-  [employer]-  Right 1 Implanted   STEM FEMORAL SUMMIT STD CEMENTED SZ 3 - [id number] Total Joint Component/Insert STEM FEMORAL SUMMIT STD CEMENTED SZ 3  [employer]-  Right 1 Implanted   IMP HEAD FEMORAL DEPUY BIPOLAR [id number] - [id number] Total Joint Component/Insert IMP HEAD FEMORAL DEPUY BIPOLAR [id number]  [employer]-  Right 1 Implanted   IMP HEAD FEMORAL DEPUY [place] 28MM +5MM 1365- - [id number] Total Joint Component/Insert IMP HEAD FEMORAL  DEPUY [place] 28MM +5MM 1365-  [employer] [facility]- [id number] Right 1 Implanted

## 2023-10-05 NOTE — PLAN OF CARE
Goal Outcome Evaluation:      Plan of Care Reviewed With: patient    Problem: Plan of Care - These are the overarching goals to be used throughout the patient stay.    Goal: Absence of Hospital-Acquired Illness or Injury  Intervention: Prevent Skin Injury  Recent Flowsheet Documentation  Taken 10/5/2023 1130 by Ping Fonseca, RN  Body Position: sitting up in bed  Taken 10/5/2023 0800 by Ping Fonseca, RN  Body Position: sitting up in bed

## 2023-10-05 NOTE — ANESTHESIA PROCEDURE NOTES
"Fascia iliaca Procedure Note    Pre-Procedure   Staff -        Anesthesiologist:  Saulo Ha DO       Performed By: anesthesiologist       Procedure Start/Stop Times: 10/5/2023 1:03 PM and 10/5/2023 1:08 PM       Pre-Anesthestic Checklist: patient identified, IV checked, site marked, risks and benefits discussed, informed consent, monitors and equipment checked, pre-op evaluation, at physician/surgeon's request and post-op pain management  Timeout:       Correct Patient: Yes        Correct Procedure: Yes        Correct Site: Yes        Correct Position: Yes        Correct Laterality: Yes        Site Marked: Yes  Procedure Documentation  Procedure: Fascia iliaca       Laterality: right       Patient Position: supine       Skin prep: Chloraprep       Needle Type: insulated       Needle Gauge: 21.        Needle Length (millimeters): 100        Ultrasound guided       1. Ultrasound was used to identify targeted nerve, plexus, vascular marker, or fascial plane and place a needle adjacent to it in real-time.       2. Ultrasound was used to visualize the spread of anesthetic in close proximity to the above referenced structure.       3. A permanent image is entered into the patient's record.    Assessment/Narrative         The placement was negative for: blood aspirated, painful injection and site bleeding       Paresthesias: No.       Bolus given via needle..        Secured via.        Insertion/Infusion Method: Single Shot       Complications: none    Medication(s) Administered   Bupivacaine 0.5% PF (Infiltration) - Infiltration   10 mL - 10/5/2023 1:03:00 PM  Medication Administration Time: 10/5/2023 1:03 PM      FOR [facility] ([facility]) ONLY:   Pain Team Contact information: please page the Pain Team Via [employer]. Search \"Pain\". During daytime hours, please page the attending first. At night please page the resident first.      "

## 2023-10-05 NOTE — PLAN OF CARE
Goal Outcome Evaluation:      Plan of Care Reviewed With: patient    Overall Patient Progress: no change    Outcome Evaluation: Pt alert to self only. Denied pain. PAINAD scores 0-2. Total cares. Refused dinner but agreed to be fed 1 cup of applesauce. Forgetful, flat expression. [place] changed and draining appropriately. NPO since midnight. Not able to make needs known. NS running at 50 mL/hr. Large BM, brief changed, repositioned. VSS. Slept between cares.    BP (!) 141/70 (BP Location: Left arm)   Pulse 97   Temp 98.6  F (37  C) (Oral)   Resp 17   LMP  (LMP Unknown)   SpO2 96%     Hank Browning RN

## 2023-10-05 NOTE — OP NOTE
PATIENT NAME: Kim Ybarra    MEDICAL RECORD #: [id number]    DATE OF OPERATION: 10/5/2023    PREOPERATIVE DIAGNOSIS: RIGHT displaced femoral neck fracture    POSTOPERATIVE DIAGNOSIS: RIGHT displaced femoral neck fracture    OPERATION: RIGHT hip hemiarthroplasty    SURGEON: Feroz Oh MD    ASSISTANT(S): Ton Gary PA-C    A skilled assistant was required due to the nature of the case for patient position, retraction, exposure, implant placement, closure and dressing application.    ANESTHESIA: General Endotracheal Anesthesia; Fascia Iliaca Block.    COMPLICATIONS: No Immediate    ANTIBIOTICS: Cefazolin in a weight based dose IV given within 1 hour prior to skin incision    EBL: 150cc    FINDINGS:  Displaced fracture of the femoral neck.  Acetabular cartilage and labrum intact.  No distal propagation of the fracture. Significant osteopenia.    IMPLANTS:  System: Fonuy  Femoral Stem: CStem 3 offset, Size standard  Femoral Head: 47mm outer diameter, 28 mm ID +5 mm Bipolar Head    PATIENT HISTORY:  The patient is a 83 year old year-old female with a past medical history of CKD3, HTN, dementia who lives in a memory care facility who sustained a femoral neck fracture as a result of a fall.  After considering the patient's condition and the impact of their hip injury on the patient's quality of life and risks of nonoperative treatment, partial hip replacement was offered as a reasonable option.    Prior to the surgery I discussed the nature of the hip replacement surgery including alternatives to surgery and the purpose of, and indications for proceeding with surgery with the patient's medical POA, Alex Ybarra. I discussed that this surgery is a shared decision between the patient, patient's family and the surgeon.    Risks and benefits and alternatives of the procedure have been explained to the patient and their family.  Anesthesia complications and risks include but are not limited to stroke, heart attack, and  death. The surgical risks include but are not limited to infection, instability/dislocation, bleeding, nerve and blood vessel injury, deep vein thrombosis, pulmonary embolus, stiffness, pain, scar, need for reoperation, leg length discrepancy, thigh numbness, weakness, and mechanical failure of the implant including loosening, metal complications, metal allergy, wear or breakage.  I discused the expected recovery from surgery and the importance of compliance with all our pre and post-operative recommendations in order to maximize the recovery. The patient/family understands the risks of loss of life, loss of limb and, loss of function and wishes to proceed. They understand they are at increased risk for instability given their history of dementia and recent falls.  A signed and witnessed consent was obtained and placed in the chart.    DESCRIPTION OF PROCEDURE:  The patient was identified in the preoperative area. A signed and witnessed consent was confirmed in the chart. The surgery team confirmed with the patient/family the operative plan and surgical site. The surgical site was confirmed by the patient/family and marked by the surgical team. The patient was given the opportunity to ask any further questions and all questions were answered.     The patient was brought to the operating room where anesthesia was induced by the anesthesia team without incident. A gates catheter was aseptically inserted if indicated per protocol.     PATIENT POSITIONING:   The patient was placed in the lateral decubitus position on a standard table using a Galvan positioner. An axillary role was placed. All extremities were padded to ensure adequate protection including floating the peroneal nerve of the down leg.     TIME OUT:  A timeout was performed prior to the procedure which verified the correct patient, positioning, operation to be performed, operative site, antibiotics, allergies, imaging, and any other concerns. All parties  were in agreement.     PROCEDURE IN DETAIL:  The operative site was cleaned, prepped, and draped in the usual sterile fashion. A final timeout was performed with all parties in agreement. A modified anterolateral approach to the hip was utilized. A 12 cm skin incision was made centered over the greater trochanter in line with the femur. This was taken down through skin and subcutaneous tissue using a 10 blade. Bleeding was controlled using electrocautery. The fascia was identified and split in line with the femur. The charnley retractor was then placed.  A split was made in the anterior 1/3 of the abductors down to femoral neck remnant and along the anterior border of vastus lateralis at the vastus ridge. A The abductor tendon was tagged and the abductors and anterior hip capsule were elevated as a sleeve along the anterolateral femoral neck and trochanter to expose the hip.     The femoral neck fracture site was exposed and a fresh osteotomy was made in order to assist with removal of the fractured femoral head and neck from the acetabulum. The femoral cut was made using the saw taking care to protect deep structures and the greater trochanter.  The broken femoral head was then removed from the acetabulum. Care was taken to preserve the labrum.  Next the femoral head was sized and the socket trialed for Bipolar femoral head size until appropriate fit and stability was achieved.     Next, the lateral remnant of neck was removed using a rongeur and the femoral canal penetrated using the canal finder.  The femur was then sequentially broached. Care was taken to avoid varus angulation of the stem and to match native anteversion.  Once a stable broach was in, a trial head and neck were placed.  The hip was quite difficult to reduce on gentle reduction maneuvers, so decision was made to place once size smaller broach, use the calcar planer to plane down the proximal femur to allow for more room for reduction. Due to  significant osteopenia and concern for iatrogenic fracture with reduction attempts, the decision was made to cement a stem in at this point.     The femur was re-exposed and neck calcar planed. The broach was then removed.The canal was irrigated and the calcar and proximal bone closely examined.  There was no evidence of fractures.  A distal cement restrictor was measured and placed 1.5cm distal to the tip of the planned stem.  The canal was irrigated with pulse lavage and dried with a sponge. Two packs of cement were mixed and then injected and pressurized into the canal. The final stem was inserted to the appropriate depth with appropriate version and allowed to dry with minimal movement.    Once the cement was hard, the hip was then trialed with trial head lengths. Once there was stable construct with restoration of soft tissue tension and leg lengths, this final head and neck length was opened.    The benítez taper was then cleaned and dried and the final head impacted into place.  The acetabulum was irrigated and confirmed to be free of debris. The hip was then reduced and taken through range of motion. The hip was stable in extension and external rotation at neutral and maximum adduction, with flexion past 90 degrees and internal rotation past 60 degrees. It did not sublux or impinge throughout range of motion. Leg lengths were appropriately restored with appropriate soft tissue shuck tensioning and knee flexion.     The hip was then copiously irrigated with normal saline. The hip was then injected with the periarticular cocktail. The hip was then closed in layers. The abductors were repaired with interrupted # 5 Ethibond. The fascia was closed with #1 Ethibond followed by a #1 Stratfix. The subcutaneous layer with 0-Vicryl in the fat, and 2-0 Vicryl in the subdermal layer. The subcuticular layer was closed with a 3-0 monocryl. The skin was then cleaned and glued and steri-strips placed followed by a sterile  dressing.     The drapes were then taken down and the patient was placed supine. Leg lengths were confirmed to be appropriate and the patient's lower extremities were warm and well perfused with brisk capillary refill and palpable pulses. The patient was then awoken, transferred to the hospital cart and taken to the PACU in stable condition. The patient tolerated the procedure well. The patient's family/caregivers were made aware of their condition.     Final sponge and needle counts were correct x2.     POSTOPERATIVE PLAN:  Pain Control: Continue per multimodal pain protocol. Minimize opioid pain medications as able.  Weight Bearing: Weight bearing as tolerated  Precautions: None  DVT Prophylaxis: Risk stratified DVT prophylaxis. Aspirin 81 mg BID x 4 weeks -- given history of recent falls want to minimize possibility of bleeding sequelae. Early ambulation and SCDs while in bed.  Antibiotics: 24 hours of perioperative antibiotics -- medicine has subsequently discontinued cetriaxone for possible pneumonia, so will keep antibiotics as standard 24 hour dose.  Diet: Advance diet as tolerated from orthopedic perspective  GI: Plan for aggressive bowel regimen to prevent constipation from opioid medications  Lines: HLIV once tolerating PO  PT/OT: Eval and treatment. Will follow up on recommendations.  Follow up:  2 weeks  Discharge plan: Pending PT/OT evaluation and ongoing workup by medical team    Feroz Oh MD  Orthopedic Surgery  [facility]

## 2023-10-05 NOTE — ANESTHESIA PROCEDURE NOTES
Airway         Procedure Start/Stop Times: 10/5/2023 12:58 PM  Staff -        CRNA: Lilian Baxter APRN CRNA       Performed By: CRNAIndications and Patient Condition       Indications for airway management: vikram-procedural       Induction type:intravenous       Mask difficulty assessment: 1 - vent by mask    Final Airway Details       Final airway type: endotracheal airway       Successful airway: ETT - single  Endotracheal Airway Details        ETT size (mm): 7.5       Cuffed: yes       Successful intubation technique: direct laryngoscopy       DL Blade Type: Bellamy 2       Grade View of Cords: 1       Adjucts: stylet       Position: Right       Measured from: gums/teeth       Secured at (cm): 23    Post intubation assessment        Placement verified by: capnometry and equal breath sounds        Number of attempts at approach: 1       Secured with: silk tape       Ease of procedure: easy       Dentition: Intact       Dental guard used and removed. Dental Guard Type: Proguard Red.    Medication(s) Administered   Medication Administration Time: 10/5/2023 12:58 PM

## 2023-10-05 NOTE — ANESTHESIA PREPROCEDURE EVALUATION
Anesthesia Pre-Procedure Evaluation    Patient: Kim Ybarra   MRN: [id number] : 1940        Procedure : Procedure(s):  HEMIARTHROPLASTY, HIP, BIPOLAR          Past Medical History:   Diagnosis Date     Poor historian       Past Surgical History:   Procedure Laterality Date     CLOSED REDUCTION, PERCUTANEOUS PINNING HIP Left 2023    Procedure: Closed reduction, percutaneous pinning left hip;  Surgeon: Lilly Rey MD;  Location: [facility] OR      Allergies   Allergen Reactions     Other Food Allergy Unknown     Beef      Social History     Tobacco Use     Smoking status: Never     Smokeless tobacco: Never   Substance Use Topics     Alcohol use: Not Currently      Wt Readings from Last 1 Encounters:   23 61.2 kg (135 lb)        Anesthesia Evaluation            ROS/MED HX  ENT/Pulmonary:       Neurologic:     (+)   dementia,                             Cardiovascular:     (+)  hypertension- -   -  - -                                      METS/Exercise Tolerance:     Hematologic:       Musculoskeletal:       GI/Hepatic:     (+) GERD,                   Renal/Genitourinary:       Endo:       Psychiatric/Substance Use:       Infectious Disease:       Malignancy:       Other:            Physical Exam    Airway        Mallampati: II   TM distance: > 3 FB   Neck ROM: full     Respiratory Devices and Support         Dental           Cardiovascular   cardiovascular exam normal          Pulmonary   pulmonary exam normal            OUTSIDE LABS:  CBC:   Lab Results   Component Value Date    WBC 8.2 10/05/2023    WBC 9.9 10/04/2023    HGB 11.6 (L) 10/05/2023    HGB 12.4 10/04/2023    HCT 35.7 10/05/2023    HCT 36.8 10/04/2023     10/05/2023     10/04/2023     BMP:   Lab Results   Component Value Date     10/05/2023     (L) 10/04/2023    POTASSIUM 4.3 10/05/2023    POTASSIUM 4.5 10/04/2023    CHLORIDE 105 10/05/2023    CHLORIDE 100 10/04/2023    CO2 22 10/05/2023    CO2 24 10/04/2023     BUN 20.9 10/05/2023    BUN 24.5 (H) 10/04/2023    CR 1.06 (H) 10/05/2023    CR 1.12 (H) 10/04/2023    GLC 87 10/05/2023     (H) 10/04/2023     COAGS:   Lab Results   Component Value Date    INR 1.22 (H) 10/04/2023     POC: No results found for: BGM, HCG, HCGS  HEPATIC:   Lab Results   Component Value Date    ALBUMIN 2.8 (L) 10/04/2023    PROTTOTAL 5.9 (L) 10/04/2023    ALT 22 10/04/2023    AST 19 10/04/2023    ALKPHOS 86 10/04/2023    BILITOTAL 0.3 10/04/2023     OTHER:   Lab Results   Component Value Date    LACT 1.1 05/25/2023    SUZY 9.7 10/05/2023    PHOS 3.0 05/20/2023    MAG 2.1 05/20/2023    LIPASE 27 10/04/2023       Anesthesia Plan    ASA Status:  3       Anesthesia Type: General.     - Airway: ETT      Maintenance: Balanced.        Consents    Anesthesia Plan(s) and associated risks, benefits, and realistic alternatives discussed. Questions answered and patient/representative(s) expressed understanding.     - Discussed:     - Discussed with:  Other (See Comment)       - Patient is DNR/DNI Status: Yes             Suspend during perioperative period? No.         Postoperative Care    Pain management: IV analgesics, Oral pain medications.   PONV prophylaxis: Dexamethasone or Solumedrol, Ondansetron (or other 5HT-3)     Comments:    Other Comments: Patient unable to orient to place, self. Spoke with daughter via phone who is POA. Had understanding of surgery as well as anesthetic. Did not have any questions. Was agreeable to a nerve block to help with post operative pain. Daughter stated that she did not want to suspend the DNR status of patient, thus she will remain to be DNR, no chest compressions during operation.             Saulo Ha, DO

## 2023-10-05 NOTE — PROGRESS NOTES
[facility]    Medicine Progress Note - Hospitalist Service    Date of Admission:  10/4/2023    Assessment & Plan     82 y/o female with h/o HTN, CKD stage 3, dementia who resides in a care facility was sent to ED for multiple falls over last several days and difficulty transfers. Work up revealed R hip fracture.     Right hip fracture  S/p  multiple falls recently at her care facility  -CT head and CT C Spine no acute findings  -Reviewed hip xrays, revealed acutely displaced subcapital fracture of right hip  -Ortho following, planned to OR later today  -Pain control with tylenol, dilaudid  -Post op DVT ppx, diet and weight bearing status per ortho recommendations  -Will likely need TCU on discharge    Dementia   -Currently residing in a care facility  -Increased confusion per report on admission at her care facility  -CT head and CT C Spine no acute findings  -No evidence of any infection  -Monitor closely with pain management post-op, limit opoids ?    Constipation  -Continue currently bowel regimen with Senna and miralax      CT abdomen showed some opacities but Chest xray negative, stopped antibiotics now.    Hypertension - Stable  -Continue PTA losartan       Diet: NPO per Anesthesia Guidelines for Procedure/Surgery Except for: Meds    DVT Prophylaxis: Pneumatic Compression Devices  Dooley Catheter: Not present  Lines: None     Cardiac Monitoring: ACTIVE order. Indication: Tachyarrhythmias, acute (48 hours)  Code Status: Full Code      Clinically Significant Risk Factors Present on Admission           # Hypercalcemia: corrected calcium is >10.1, will monitor as appropriate    # Hypoalbuminemia: Lowest albumin = 2.8 g/dL at 10/4/2023 12:03 PM, will monitor as appropriate  # Coagulation Defect: INR = 1.22 (Ref range: 0.85 - 1.15) and/or PTT = N/A, will monitor for bleeding  # Drug Induced Platelet Defect: home medication list includes an antiplatelet medication   # Hypertension: Home  "medication list includes antihypertensive(s)                 Disposition Plan      Expected Discharge Date: 10/08/2023    Discharge Delays: Procedure Pending (enter procedure & time in comments)              Tamika Cullen MD  Hospitalist Service  [facility]  Securely message with Amber (more info)  Text page via [employer] Paging/Directory   ______________________________________________________________________    Interval History   .  Patient is new to me. Chart reviewed and events noted.  Patient seen and examined.   Pleasantly confused, tells me \"I borke my hip\", unable to provide any other history. Appears comfortable and not in pain      Physical Exam   Vital Signs: Temp: 97.8  F (36.6  C) Temp src: Oral BP: (!) 146/78 Pulse: 99   Resp: 16 SpO2: 95 % O2 Device: None (Room air)    Weight: 0 lbs 0 oz    General: NAD  HEENT:EOMI, AT,NC  CVS:RRR, no edema  RS:CTAB  Neurology:Awake, alert, pleasantly confused.  Psy:Calm      Medical Decision Making       >40 MINUTES SPENT BY ME on the date of service doing chart review, history, exam, documentation & further activities per the note.      Plan d/w patient bedside RN, SW/CM    Data     I have personally reviewed the following data over the past 24 hrs:    8.2  \   11.6 (L)   / 242     136 105 20.9 /  87   4.3 22 1.06 (H) \     ALT: 22 AST: 19 AP: 86 TBILI: 0.3   ALB: 2.8 (L) TOT PROTEIN: 5.9 (L) LIPASE: 27     Procal: 0.10 (H) CRP: N/A Lactic Acid: N/A       INR:  1.22 (H) PTT:  N/A   D-dimer:  N/A Fibrinogen:  N/A       "

## 2023-10-05 NOTE — CONSULTS
"Care Management Initial Consult    General Information  Assessment completed with: Children, daughter Alex  Type of CM/SW Visit: Initial Assessment    Primary Care Provider verified and updated as needed: Yes   Readmission within the last 30 days:           Advance Care Planning:            Communication Assessment  Patient's communication style: spoken language (English or Bilingual)    Hearing Difficulty or Deaf: yes   Wear Glasses or Blind: no    Cognitive  Cognitive/Neuro/Behavioral: .WDL except, orientation, level of consciousness  Level of Consciousness: confused  Arousal Level: arouses to voice  Orientation: disoriented to, place, time, situation  Mood/Behavior: calm, cooperative     Speech:  (repetitive (\"that's Mash\" for example)    Living Environment:   People in home: alone     Current living Arrangements: assisted living  Name of Facility: [facility]   Able to return to prior arrangements:  (Pending)  Living Arrangement Comments: Memory Care    Family/Social Support:  Care provided by: other (see comments)  Provides care for: no one, unable/limited ability to care for self  Marital Status:              Description of Support System: Supportive           Does the patient's insurance plan have a 3 day qualifying hospital stay waiver?  No    Lifestyle & Psychosocial Needs:  Social Determinants of Health     Food Insecurity: Not on file   Depression: Not on file   Housing Stability: Not on file   Tobacco Use: Low Risk  (10/4/2023)    Patient History     Smoking Tobacco Use: Never     Smokeless Tobacco Use: Never     Passive Exposure: Not on file   Financial Resource Strain: Not on file   Alcohol Use: Not on file   Transportation Needs: Not on file   Physical Activity: Not on file   Interpersonal Safety: Not on file   Stress: Not on file   Social Connections: Not on file       Functional Status:  Prior to admission patient needed assistance:   Dependent ADLs:: Ambulation-walker, Wheelchair-with assist, " Transfers, Toileting, Grooming, Dressing, Bathing  Dependent IADLs:: Cleaning, Cooking, Shopping, Laundry, Medication Management, Meal Preparation, Money Management, Transportation, Incontinence       Mental Health Status:  Mental Health Status: No Current Concerns       Chemical Dependency Status:  Chemical Dependency Status: No Current Concerns             Values/Beliefs:  Spiritual, Cultural Beliefs, Shinto Practices, Values that affect care:    Description of Beliefs that Will Affect Care: read bible verse to her - join in prayewr            Additional Information:  Assessment completed with patient's daughter Alex.   Patient lives at [facility] in [place]. [phone] (Desiree, Director of Services)  Daughter reports patient is assist of 1 with all mobility, uses a walker but mostly a wheelchair at baseline.  CN will follow and work with daughter and facility on discharge planning.    Jeaneth Cordero, SW

## 2023-10-05 NOTE — INTERVAL H&P NOTE
I have reviewed the surgical (or preoperative) H&P that is linked to this encounter, and examined the patient. There are no significant changes    Clinical Conditions Present on Arrival:  Clinically Significant Risk Factors Present on Admission         # Hyponatremia: Lowest Na = 133 mmol/L in last 30 days, will monitor as appropriate      # Hypoalbuminemia: Lowest albumin = 2.8 g/dL at 10/4/2023 12:03 PM, will monitor as appropriate   # Coagulation Defect: INR = 1.22 (Ref range: 0.85 - 1.15) and/or PTT = N/A, will monitor for bleeding  # Drug Induced Platelet Defect: home medication list includes an antiplatelet medication

## 2023-10-05 NOTE — ANESTHESIA CARE TRANSFER NOTE
Patient: Kim Ybarra    Procedure: Procedure(s):  HEMIARTHROPLASTY, HIP, BIPOLAR       Diagnosis: Hip fracture, right, closed, initial encounter (H) [S72.001A]  Diagnosis Additional Information: No value filed.    Anesthesia Type:   General     Note:    Oropharynx: oropharynx clear of all foreign objects  Level of Consciousness: drowsy  Oxygen Supplementation: face mask    Independent Airway: airway patency satisfactory and stable  Dentition: dentition unchanged  Vital Signs Stable: post-procedure vital signs reviewed and stable  Report to RN Given: handoff report given  Patient transferred to: PACU    Handoff Report: Identifed the Patient, Identified the Reponsible Provider, Reviewed the pertinent medical history, Discussed the surgical course, Reviewed Intra-OP anesthesia mangement and issues during anesthesia, Set expectations for post-procedure period and Allowed opportunity for questions and acknowledgement of understanding      Vitals:  Vitals Value Taken Time   /73 10/05/23 1518   Temp 97.9    Pulse 84 10/05/23 1518   Resp 26 10/05/23 1518   SpO2 100 % 10/05/23 1518   Vitals shown include unvalidated device data.    Electronically Signed By: JERONIMO Gabriel CRNA  October 5, 2023  3:20 PM

## 2023-10-05 NOTE — ANESTHESIA POSTPROCEDURE EVALUATION
Patient: Kim Ybarra    Procedure: Procedure(s):  HEMIARTHROPLASTY, HIP, BIPOLAR       Anesthesia Type:  General    Note:  Disposition: Outpatient   Postop Pain Control: Uneventful            Sign Out: Well controlled pain   PONV: No   Neuro/Psych: Uneventful            Sign Out: Acceptable/Baseline neuro status   Airway/Respiratory: Uneventful            Sign Out: Acceptable/Baseline resp. status   CV/Hemodynamics: Uneventful            Sign Out: Acceptable CV status; No obvious hypovolemia; No obvious fluid overload   Other NRE: NONE   DID A NON-ROUTINE EVENT OCCUR? No    Event details/Postop Comments:  Patient recovering comfortably.        Last vitals:  Vitals Value Taken Time   /77 10/05/23 1545   Temp 36.6  C (97.9  F) 10/05/23 1518   Pulse 91 10/05/23 1547   Resp 17 10/05/23 1547   SpO2 95 % 10/05/23 1547   Vitals shown include unvalidated device data.    Electronically Signed By: Saulo Ha DO  October 5, 2023  3:49 PM

## 2023-10-05 NOTE — PLAN OF CARE
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    She is very cooperative.    She denied pain.      Problem: Plan of Care - These are the overarching goals to be used throughout the patient stay.    Goal: Absence of Hospital-Acquired Illness or Injury  Outcome: Progressing  Intervention: Identify and Manage Fall Risk  Recent Flowsheet Documentation  Taken 10/5/2023 0800 by Ping Fonseca RN  Safety Promotion/Fall Prevention:   activity supervised   assistive device/personal items within reach   increased rounding and observation  Intervention: Prevent Skin Injury  Recent Flowsheet Documentation  Taken 10/5/2023 0800 by Ping Fonseca, RN  Body Position: sitting up in bed  Intervention: Prevent and Manage VTE (Venous Thromboembolism) Risk  Recent Flowsheet Documentation  Taken 10/5/2023 0800 by Ping Fonseca, RN  VTE Prevention/Management: SCDs (sequential compression devices) off   Goal Outcome Evaluation:

## 2023-10-06 ENCOUNTER — APPOINTMENT (OUTPATIENT)
Dept: OCCUPATIONAL THERAPY | Facility: HOSPITAL | Age: 83
DRG: 522 | End: 2023-10-06
Payer: COMMERCIAL

## 2023-10-06 ENCOUNTER — APPOINTMENT (OUTPATIENT)
Dept: PHYSICAL THERAPY | Facility: HOSPITAL | Age: 83
DRG: 522 | End: 2023-10-06
Attending: PHYSICIAN ASSISTANT
Payer: COMMERCIAL

## 2023-10-06 PROBLEM — F01.C0 SEVERE VASCULAR DEMENTIA WITHOUT BEHAVIORAL DISTURBANCE, PSYCHOTIC DISTURBANCE, MOOD DISTURBANCE, OR ANXIETY (H): Status: ACTIVE | Noted: 2023-10-06

## 2023-10-06 LAB
ATRIAL RATE - MUSE: 90 BPM
DIASTOLIC BLOOD PRESSURE - MUSE: 86 MMHG
ERYTHROCYTE [DISTWIDTH] IN BLOOD BY AUTOMATED COUNT: 12.1 % (ref 10–15)
GLUCOSE BLDC GLUCOMTR-MCNC: 93 MG/DL (ref 70–99)
HCT VFR BLD AUTO: 31.1 % (ref 35–47)
HGB BLD-MCNC: 10.2 G/DL (ref 11.7–15.7)
HOLD SPECIMEN: NORMAL
INTERPRETATION ECG - MUSE: NORMAL
MCH RBC QN AUTO: 28.1 PG (ref 26.5–33)
MCHC RBC AUTO-ENTMCNC: 32.8 G/DL (ref 31.5–36.5)
MCV RBC AUTO: 86 FL (ref 78–100)
P AXIS - MUSE: 69 DEGREES
PLATELET # BLD AUTO: 278 10E3/UL (ref 150–450)
PR INTERVAL - MUSE: 146 MS
QRS DURATION - MUSE: 82 MS
QT - MUSE: 356 MS
QTC - MUSE: 435 MS
R AXIS - MUSE: 70 DEGREES
RBC # BLD AUTO: 3.63 10E6/UL (ref 3.8–5.2)
SYSTOLIC BLOOD PRESSURE - MUSE: 178 MMHG
T AXIS - MUSE: 71 DEGREES
VENTRICULAR RATE- MUSE: 90 BPM
WBC # BLD AUTO: 11.7 10E3/UL (ref 4–11)

## 2023-10-06 PROCEDURE — 36415 COLL VENOUS BLD VENIPUNCTURE: CPT | Performed by: HOSPITALIST

## 2023-10-06 PROCEDURE — 97161 PT EVAL LOW COMPLEX 20 MIN: CPT | Mod: GP

## 2023-10-06 PROCEDURE — 250N000013 HC RX MED GY IP 250 OP 250 PS 637: Performed by: PHYSICIAN ASSISTANT

## 2023-10-06 PROCEDURE — 99232 SBSQ HOSP IP/OBS MODERATE 35: CPT | Performed by: INTERNAL MEDICINE

## 2023-10-06 PROCEDURE — 97530 THERAPEUTIC ACTIVITIES: CPT | Mod: GP

## 2023-10-06 PROCEDURE — 250N000011 HC RX IP 250 OP 636: Performed by: PHYSICIAN ASSISTANT

## 2023-10-06 PROCEDURE — 97166 OT EVAL MOD COMPLEX 45 MIN: CPT | Mod: GO

## 2023-10-06 PROCEDURE — 250N000013 HC RX MED GY IP 250 OP 250 PS 637: Performed by: INTERNAL MEDICINE

## 2023-10-06 PROCEDURE — 258N000003 HC RX IP 258 OP 636: Performed by: PHYSICIAN ASSISTANT

## 2023-10-06 PROCEDURE — 85027 COMPLETE CBC AUTOMATED: CPT | Performed by: HOSPITALIST

## 2023-10-06 PROCEDURE — 120N000001 HC R&B MED SURG/OB

## 2023-10-06 PROCEDURE — 97535 SELF CARE MNGMENT TRAINING: CPT | Mod: GO

## 2023-10-06 RX ORDER — AMOXICILLIN 250 MG
2 CAPSULE ORAL 2 TIMES DAILY
Status: DISCONTINUED | OUTPATIENT
Start: 2023-10-06 | End: 2023-10-09 | Stop reason: HOSPADM

## 2023-10-06 RX ORDER — AMOXICILLIN 250 MG
1 CAPSULE ORAL 2 TIMES DAILY
Qty: 30 TABLET | Refills: 0 | Status: SHIPPED | OUTPATIENT
Start: 2023-10-06

## 2023-10-06 RX ORDER — ACETAMINOPHEN 325 MG/1
975 TABLET ORAL EVERY 8 HOURS
Qty: 60 TABLET | Refills: 0 | Status: SHIPPED | OUTPATIENT
Start: 2023-10-06

## 2023-10-06 RX ORDER — OXYCODONE HYDROCHLORIDE 5 MG/1
2.5 TABLET ORAL EVERY 4 HOURS PRN
Qty: 10 TABLET | Refills: 0 | Status: SHIPPED | OUTPATIENT
Start: 2023-10-06 | End: 2023-10-09

## 2023-10-06 RX ADMIN — ACETAMINOPHEN 975 MG: 325 TABLET ORAL at 08:14

## 2023-10-06 RX ADMIN — LOSARTAN POTASSIUM 50 MG: 50 TABLET, FILM COATED ORAL at 08:14

## 2023-10-06 RX ADMIN — ACETAMINOPHEN 975 MG: 325 TABLET ORAL at 16:23

## 2023-10-06 RX ADMIN — CEFAZOLIN 1 G: 1 INJECTION, POWDER, FOR SOLUTION INTRAMUSCULAR; INTRAVENOUS at 13:45

## 2023-10-06 RX ADMIN — POLYETHYLENE GLYCOL 3350 17 G: 17 POWDER, FOR SOLUTION ORAL at 08:14

## 2023-10-06 RX ADMIN — SODIUM CHLORIDE, POTASSIUM CHLORIDE, SODIUM LACTATE AND CALCIUM CHLORIDE: 600; 310; 30; 20 INJECTION, SOLUTION INTRAVENOUS at 11:21

## 2023-10-06 RX ADMIN — Medication 81 MG: at 21:18

## 2023-10-06 RX ADMIN — ACETAMINOPHEN 975 MG: 325 TABLET ORAL at 01:01

## 2023-10-06 RX ADMIN — SENNOSIDES AND DOCUSATE SODIUM 1 TABLET: 50; 8.6 TABLET ORAL at 08:14

## 2023-10-06 RX ADMIN — CEFAZOLIN 1 G: 1 INJECTION, POWDER, FOR SOLUTION INTRAMUSCULAR; INTRAVENOUS at 05:08

## 2023-10-06 RX ADMIN — SENNOSIDES AND DOCUSATE SODIUM 2 TABLET: 50; 8.6 TABLET ORAL at 21:18

## 2023-10-06 RX ADMIN — SIMVASTATIN 40 MG: 10 TABLET, FILM COATED ORAL at 08:15

## 2023-10-06 RX ADMIN — OXYCODONE HYDROCHLORIDE 5 MG: 5 TABLET ORAL at 21:19

## 2023-10-06 RX ADMIN — Medication 81 MG: at 08:14

## 2023-10-06 ASSESSMENT — ACTIVITIES OF DAILY LIVING (ADL)
ADLS_ACUITY_SCORE: 66
ADLS_ACUITY_SCORE: 62
ADLS_ACUITY_SCORE: 62
ADLS_ACUITY_SCORE: 68
ADLS_ACUITY_SCORE: 62
ADLS_ACUITY_SCORE: 62
ADLS_ACUITY_SCORE: 66
ADLS_ACUITY_SCORE: 62

## 2023-10-06 NOTE — PROGRESS NOTES
Orthopedic Progress Note      Assessment: 1 Day Post-Op  S/P Procedure(s):  HEMIARTHROPLASTY, HIP, BIPOLAR     Plan:   Pain Control: Continue per multimodal pain protocol. Minimize opioid pain medications as able.  Weight Bearing: Weight bearing as tolerated  Precautions: None  DVT Prophylaxis: Risk stratified DVT prophylaxis. Aspirin 81 mg BID x 4 weeks -- given history of recent falls want to minimize possibility of bleeding sequelae. Early ambulation and SCDs while in bed.  Diet: Advance diet as tolerated from orthopedic perspective  GI: Plan for aggressive bowel regimen to prevent constipation from opioid medications  Lines: HLIV once tolerating PO  PT/OT: Eval and treatment. Will follow up on recommendations.  Follow up:  2 weeks  Discharge plan: Pending PT/OT evaluation and ongoing workup by medical team      Subjective:  Pain: mild  Nausea, Vomiting:  No  Lightheadedness, Dizziness:  No  Neuro:  Patient denies new onset numbness or paresthesias  Fever, chills: No  Chest pain: No  SOB: No    Patient reports feeling well today.She is a bit confused today but pleasant and agreeable.  Does not note much pain in hip today.  Does not remember if she has had a BM. All questions/concerns answered.      Objective:  /67 (BP Location: Right arm)   Pulse 103   Temp 99.5  F (37.5  C) (Oral)   Resp 18   Wt 61.2 kg (135 lb)   LMP  (LMP Unknown)   SpO2 94%   BMI 23.17 kg/m      The patient is A&Ox3. Appears comfortable, sitting up in bed  Calves without tenderness, neg Román's  Brisk capillary refill in the toes.   Palpable Right dorsalis pedis pulse. Right foot warm & well-perfused.  Sensation is intact to light touch & equal bilaterally in the femoral, DP, SP & tibial nerve distributions.  ROM: Appropriately flexes & extends all toes bilaterally.   Motor: +5/5 dorsiflexion, plantar flexion & EHL bilaterally.   Leg lengths equal.  Dressing C/D/I without strikethrough, no surrounding erythema.      No drain      Pertinent Labs   Lab Results: personally reviewed.   Lab Results   Component Value Date    INR 1.22 (H) 10/04/2023    INR 1.10 05/20/2023     Lab Results   Component Value Date    WBC 11.7 (H) 10/06/2023    HGB 10.2 (L) 10/06/2023    HCT 31.1 (L) 10/06/2023    MCV 86 10/06/2023     10/06/2023     Lab Results   Component Value Date     10/05/2023    CO2 22 10/05/2023         Report completed by:  ZORAN COUGHLIN PA-C  Date: 10/06/2023  [facility]

## 2023-10-06 NOTE — PROGRESS NOTES
"   10/06/23 0930   Appointment Info   Signing Clinician's Name / Credentials (OT) Sveta Virginia, OTR/L   Living Environment   People in Home facility resident   Current Living Arrangements extended care facility  (memory care)   Living Environment Comments Pt unable to provide details on living environment d/t confusion.   Self-Care   Equipment Currently Used at Home walker, rolling   Fall history within last six months yes   Number of times patient has fallen within last six months 3   Activity/Exercise/Self-Care Comment Pt unable to provide information on PLOF, per chart review pt has assistance of 1 with ADLs and mobility at baseline using a walker.   Instrumental Activities of Daily Living (IADL)   IADL Comments Pt dependent with IADLs - lives in memory care.   General Information   Onset of Illness/Injury or Date of Surgery 10/05/23   Referring Physician Ton Gary PA-C   Patient/Family Therapy Goal Statement (OT) none stated   Additional Occupational Profile Info/Pertinent History of Current Problem Per chart review, pt \"is 82 y/o female with h/o HTN, CKD stage 3, dementia who resides in a care facility was sent to ED for multiple falls over last several days and difficulty transfers. Work up revealed R hip fracture.\"   Existing Precautions/Restrictions fall;weight bearing  (no hip precautions)   Right Lower Extremity (Weight-bearing Status) weight-bearing as tolerated (WBAT)   Cognitive Status Examination   Orientation Status not oriented to person, place or time  (knows she is in a hospital)   Cognitive Status Comments Pt with difficulty following commands accurately, attempting to follow commands and agreeable to participation in all therapist-directed tasks.   Pain Assessment   Patient Currently in Pain Yes, see Vital Sign flowsheet   Range of Motion Comprehensive   General Range of Motion no range of motion deficits identified   Strength Comprehensive (MMT)   Comment, General Manual Muscle Testing " (MMT) Assessment Generalized BUE weakness noted functionally.   Bed Mobility   Bed Mobility supine-sit   Supine-Sit [place] (Bed Mobility) maximum assist (25% patient effort);2 person assist   Assistive Device (Bed Mobility) draw sheet;bed rails   Transfers   Transfers sit-stand transfer;bed-chair transfer   Transfer Skill: Bed to Chair/Chair to Bed   Bed-Chair [place] (Transfers) dependent (less than 25% patient effort)   Transfer Comments liko lift   Sit-Stand Transfer   Sit-Stand [place] (Transfers) maximum assist (25% patient effort);2 person assist   Balance   Balance Comments Pt demo posterior leaning with unsupported sitting EOB with Mod A needed for safety, Max Ax2 to maintain upright posture with STS.   Activities of Daily Living   BADL Assessment/Intervention lower body dressing;grooming;toileting   Lower Body Dressing Assessment/Training   Position (Lower Body Dressing) supine   [place] Level (Lower Body Dressing) dependent (less than 25% patient effort)   Grooming Assessment/Training   Position (Grooming) edge of bed sitting   [place] Level (Grooming) minimum assist (75% patient effort)   Toileting   Comment, (Toileting) Unable to safely attempt transfer to toilet/commode d/t unable to pivot.   [place] Level (Toileting) not tested   Clinical Impression   Criteria for Skilled Therapeutic Interventions Met (OT) Yes, treatment indicated   OT Diagnosis Impaired ability to perform ADLs and functional mobility.   Influenced by the following impairments s/p R hip fracture   OT Problem List-Impairments impacting ADL problems related to;activity tolerance impaired;balance;cognition;mobility;inability to direct their own care;strength;pain;post-surgical precautions   Assessment of Occupational Performance 3-5 Performance Deficits   Identified Performance Deficits bed mobility, transfers, lower body dressing, toileting   Planned Therapy Interventions (OT) ADL retraining;balance  training;bed mobility training;cognition;strengthening;transfer training;progressive activity/exercise   Clinical Decision Making Complexity (OT) moderate complexity   Risk & Benefits of therapy have been explained evaluation/treatment results reviewed;care plan/treatment goals reviewed;risks/benefits reviewed;participants included;patient   Clinical Impression Comments Pt would benefit from skilled OT services to promote ability to perform ADLs and transfers s/p R hip fracture.   OT Total Evaluation Time   OT Eval, Moderate Complexity Minutes (04502) 10   OT Goals   Therapy Frequency (OT) 4 times/wk   OT Predicted Duration/Target Date for Goal Attainment 10/13/23   OT Goals Hygiene/Grooming;Bed Mobility;Transfers;Toilet Transfer/Toileting   OT: Hygiene/Grooming supervision/stand-by assist  (seated EOB)   OT: Bed Mobility Moderate assist;supine to/from sitting   OT: Transfer Moderate assist;with assistive device   OT: Toilet Transfer/Toileting Moderate assist;toilet transfer;cleaning and garment management;using adaptive equipment   Self-Care/Home Management   Self-Care/Home Mgmt/ADL, Compensatory, Meal Prep Minutes (96189) 8   Symptoms Noted During/After Treatment (Meal Preparation/Planning Training) fatigue;increased pain   Treatment Detail/Skilled Intervention Pt intially requiring Mod A for upright sitting balance unsupported EOB. After moderate VC for hand placement and tech, pt decreased to Min A. Pt completed additional STS with FWW with Max Ax2, unable to come to standing despite cueing d/t posterior leaning. Attempted STS with arm-in-arm tech x2, first attempt pt only partially able to lift hips d/t posterior pushing, second attempt to place zackary sling pt able to fully come to stand with Max Ax2. Updated pt's white board and notified nursing staff of recommendation for only using mechanical lift for pt's transfers at this time.   OT Discharge Planning   OT Plan Ax2 bed mobility and STS, EOB G/H, close  transfers/toileting as able   OT Discharge Recommendation (DC Rec) Transitional Care Facility   OT Rationale for DC Rec Pt currently requiring Max Ax2 for bed mobility and ADLs, zackary lift for transfers. Recommend TCU to progress strength and safety as pt Ax1 at memory care at baseline, unsure if memory care can provide assistance level pt currently needs.   OT Brief overview of current status Max Ax2 bed mobility, Max Ax2 STS, zackary lift for transfers, dependent lower body dressing, Min A G/H seated EOB   Total Session Time   Timed Code Treatment Minutes 8   Total Session Time (sum of timed and untimed services) 18

## 2023-10-06 NOTE — PLAN OF CARE
Patient returned from surgery around 1630.  She has been very drowsy this shift.   She had some broth and applesauce when daughter was visiting. Otherwise just some sips of water, while in the room.  She is on LR IV fluids running at 75 ml/hr.  She has denied pain so far this shift. Although she did moan slightly when lifting her leg slightly.  Vitals are stable.  Will pass onto monitor. Tanya Prabhakar RN     Problem: Plan of Care - These are the overarching goals to be used throughout the patient stay.    Goal: Absence of Hospital-Acquired Illness or Injury  Outcome: Progressing  Intervention: Identify and Manage Fall Risk  Recent Flowsheet Documentation  Taken 10/5/2023 1620 by Tanya Prabhakar RN  Safety Promotion/Fall Prevention:   activity supervised   assistive device/personal items within reach   mobility aid in reach   nonskid shoes/slippers when out of bed   room door open   safety round/check completed  Goal: Optimal Comfort and Wellbeing  Outcome: Progressing     Problem: Risk for Delirium  Goal: Optimal Coping  Outcome: Progressing  Goal: Improved Behavioral Control  Outcome: Progressing  Intervention: Minimize Safety Risk  Recent Flowsheet Documentation  Taken 10/5/2023 1620 by Tanya Prabhakar RN  Enhanced Safety Measures: room near unit station  Goal: Improved Attention and Thought Clarity  Outcome: Progressing  Goal: Improved Sleep  Outcome: Progressing   Goal Outcome Evaluation:

## 2023-10-06 NOTE — PROGRESS NOTES
10/06/23 0900   Appointment Info   Signing Clinician's Name / Credentials (PT) Chante Thomas, PT, DPT   Living Environment   People in Home facility resident   Current Living Arrangements extended care facility  ([facility])   Home Accessibility no concerns   Transportation Anticipated agency   Living Environment Comments Patient is unable to provide information on living environment.   Self-Care   Equipment Currently Used at Home walker, rolling   Fall history within last six months yes   Number of times patient has fallen within last six months   (At least 2)   Activity/Exercise/Self-Care Comment Patient is unable to provide information on PLOF.   General Information   Onset of Illness/Injury or Date of Surgery 10/04/23   Referring Physician Ton Gary, DAVID   Patient/Family Therapy Goals Statement (PT) None stated   Pertinent History of Current Problem (include personal factors and/or comorbidities that impact the POC) Hip fracture, right, closed, initial encounter   Existing Precautions/Restrictions no known precautions/restrictions   Weight-Bearing Status - LLE full weight-bearing   Weight-Bearing Status - RLE weight-bearing as tolerated   Cognition   Affect/Mental Status (Cognition) confused   Orientation Status (Cognition) oriented to;person;place;situation;disoriented to;time   Follows Commands (Cognition) follows one-step commands   Pain Assessment   Patient Currently in Pain Yes, see Vital Sign flowsheet   Range of Motion (ROM)   Range of Motion ROM deficits secondary to surgical procedure   Strength (Manual Muscle Testing)   Strength (Manual Muscle Testing) Deficits observed during functional mobility   Bed Mobility   Bed Mobility supine-sit   Supine-Sit [place] (Bed Mobility) maximum assist (25% patient effort);2 person assist   Bed Mobility Limitations cognitive deficits;decreased ability to use legs for bridging/pushing   Impairments Contributing to Impaired Bed Mobility  pain;decreased strength   Assistive Device (Bed Mobility) bed rails;draw sheet   Transfers   Transfers sit-stand transfer   Transfer Safety Concerns Noted decreased weight-shifting ability   Impairments Contributing to Impaired Transfers pain;decreased strength   Sit-Stand Transfer   Sit-Stand [place] (Transfers) maximum assist (25% patient effort);2 person assist   Assistive Device (Sit-Stand Transfers) walker, front-wheeled   Gait/Stairs (Locomotion)   [place] Level (Gait) unable to assess   Comment, (Gait/Stairs) Patient is unable to ambulate due to pain and weakness.   Clinical Impression   Criteria for Skilled Therapeutic Intervention Yes, treatment indicated   PT Diagnosis (PT) Impaired functional mobility   Influenced by the following impairments Pain, decreased strength   Functional limitations due to impairments Bed mobility, transfers, gait   Clinical Presentation (PT Evaluation Complexity) Stable/Uncomplicated   Clinical Presentation Rationale Patient presents as medically diagnosed.   Clinical Decision Making (Complexity) low complexity   Planned Therapy Interventions (PT) balance training;bed mobility training;gait training;home exercise program;patient/family education;ROM (range of motion);strengthening;transfer training;home program guidelines   Risk & Benefits of therapy have been explained evaluation/treatment results reviewed;care plan/treatment goals reviewed;patient   PT Total Evaluation Time   PT Eval, Low Complexity Minutes (70183) 15   Plan of Care Review   Plan of Care Reviewed With patient   Physical Therapy Goals   PT Frequency Daily   PT Predicted Duration/Target Date for Goal Attainment 10/13/23   PT Goals Bed Mobility;Transfers;Gait   PT: Bed Mobility Moderate assist;Supine to/from sit  (Ax1)   PT: Transfers Moderate assist;Sit to/from stand;Bed to/from chair;Assistive device  (Ax1)   PT: Gait Moderate assist;Assistive device;10 feet  (Ax1)   Interventions   Interventions  Quick Adds Therapeutic Activity   Therapeutic Activity   Therapeutic Activities: dynamic activities to improve functional performance Minutes (69794) 15   Symptoms Noted During/After Treatment Increased pain   Treatment Detail/Skilled Intervention Static sitting balance at EOB with CGA-min A to maintain midline. 3 additional sit<>stands from EOB with max A of 2, arm-in-arm. Sling placed under patient on third attempt. OH lift bed<>chair. Total A of 2 to reposition in chair.   PT Discharge Planning   PT Plan Bed mobility, transfers, standing tolerance, post-op hip exercises   PT Discharge Recommendation (DC Rec) Transitional Care Facility   PT Rationale for DC Rec Patient requires assist of 2 for bed mobility and mechanical lift for transfers. Per chart review, patient is an assist of 1 for mobility at baseline.   PT Brief overview of current status Mechanical lift for bed<>chair transfers.

## 2023-10-06 NOTE — PROGRESS NOTES
[facility]    Medicine Progress Note - Hospitalist Service    Date of Admission:  10/4/2023    Assessment & Plan     82 y/o female with h/o HTN, CKD stage 3, dementia who resides in a care facility was sent to ED for multiple falls over last several days and difficulty transfers. Work up revealed R hip fracture.     Right hip fracture  S/p  multiple falls recently at her care facility  -CT head and CT C Spine no acute findings  -Reviewed hip xrays, revealed acutely displaced subcapital fracture of right hip  -Ortho following, planned to OR later today  -Pain control with tylenol, dilaudid  -Post op DVT ppx, diet and weight bearing status per ortho recommendations  -Will likely need TCU on discharge    Dementia   -Currently residing in a care facility  -Increased confusion per report on admission at her care facility  -CT head and CT C Spine no acute findings  -No evidence of any infection  -Monitor closely with pain management post-op, limit opoids ?    Constipation  -Continue currently bowel regimen with Senna and miralax    CT abdomen showed some opacities but Chest xray negative, stopped antibiotics now.    Hypertension - Stable  -Continue PTA losartan    Diet: Advance Diet as Tolerated: Regular Diet Adult  Discharge Instruction - Regular Diet Adult    DVT Prophylaxis: Pneumatic Compression Devices  Dooley Catheter: PRESENT, indication: Other (Comment) (surgery)  Lines: None     Cardiac Monitoring: None  Code Status: Full Code      Clinically Significant Risk Factors              # Hypoalbuminemia: Lowest albumin = 2.8 g/dL at 10/4/2023 12:03 PM, will monitor as appropriate    # Coagulation Defect: INR = 1.22 (Ref range: 0.85 - 1.15) and/or PTT = N/A, will monitor for bleeding    Disposition Plan      Expected Discharge Date: 10/08/2023    Discharge Delays: PT Disposition recs needed  OT Disposition recs needed           Julia Gaitan MD  Hospitalist Service  [facility]  "Hospital  Securely message with Amber (more info)  Text page via [facility] Paging/Directory   ______________________________________________________________________    Interval History   Patient is new to me. Chart reviewed and events noted.  Patient seen and examined.   Pleasantly confused, unable to right hip pain, says \"ouch\" when gently touched to upper arms or legs.  Appears comfortable and not in pain, sitting in the chair.    Physical Exam   Vital Signs: Temp: 98.4  F (36.9  C) Temp src: Oral BP: 128/70 Pulse: 107   Resp: 18 SpO2: 97 % O2 Device: None (Room air) Oxygen Delivery: 1 LPM  Weight: 143 lbs 1.26 oz  General: NAD  HEENT:EOMI, AT,NC  CVS:RRR, no edema  RS:CTAB  Neurology:Awake, alert, pleasantly confused.  Psy:Calm    Medical Decision Making       >40 MINUTES SPENT BY ME on the date of service doing chart review, history, exam, documentation & further activities per the note.      Plan d/w patient bedside RN, SW/CM    Data     I have personally reviewed the following data over the past 24 hrs:    11.7 (H)  \   10.2 (L)   / 278     N/A N/A N/A /  93   N/A N/A N/A \       "

## 2023-10-06 NOTE — PLAN OF CARE
"  Problem: Plan of Care - These are the overarching goals to be used throughout the patient stay.    Goal: Optimal Comfort and Wellbeing  Outcome: Progressing  Intervention: Monitor Pain and Promote Comfort  Recent Flowsheet Documentation  Taken 10/6/2023 0813 by Janet Powell, RN  Pain Management Interventions:   cold applied   pillow support provided   rest   Goal Outcome Evaluation:  Pt occasionally states \"ow, ow\". Difficult to assess pts pain level d/t hx dementia. Used PAINAD scale, sched. APAP admin. & ice applied to R hip area. Pt currently sleeping reclined in chair.      Problem: Risk for Delirium  Goal: Improved Behavioral Control  Outcome: Progressing   No behaviors noted, pt calm & compliant.                           "

## 2023-10-06 NOTE — PROGRESS NOTES
SPIRITUAL HEALTH SERVICES Progress Note  [facility], P4    Saw pt Kim Ybarra per admission screening request.    Patient/Family Understanding of Illness and Goals of Care - Kim shared about her fall and broken hip. She reports feeling okay today.     Distress and Loss - Ongoing health challenges, otherwise no other distress noted at this time.     Strengths, Coping, and Resources - Not able to assess at this time.     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 Patient welcomed prayer and expressed appreciation for the visit.      Plan of Care - No further plans to visit at this time, but  staff available if further needs arise.     Jeronimo Sarmiento MDiv, [facility]  /Manager Spiritual Health Services  [phone]  l

## 2023-10-06 NOTE — PROGRESS NOTES
Care Management Follow Up    Length of Stay (days): 2    Expected Discharge Date: 10/08/2023     Concerns to be Addressed:       Patient plan of care discussed at interdisciplinary rounds: Yes    Anticipated Discharge Disposition:  10/8/2023     Anticipated Discharge Services:  TCU  Anticipated Discharge DME:  To be determined by TX team    Patient/family educated on Medicare website which has current facility and service quality ratings:  yes  Education Provided on the Discharge Plan:  yes  Patient/Family in Agreement with the Plan:  yes    Referrals Placed by CM/SW:  Multiple  Private pay costs discussed: Not applicable    Additional Information:  Previously assessed. Stopped by pt room and pt told SW to talk with daughter Alex. Called daughter to discuss plan. Daughter in agreement with TCU- reported that previously she had been sent back to memory care without TCU and was not successful due to unmet needs. Discussed facilities and pt needs. Daughter would like the placement to be as close to [place] as possible. [place] facilities are top choice. [facility] is unable to accommodate for memory care needs. Referrals also sent to [facility], [facility] and [place] care centers. Also referral sent to [facility] coordinator reported they maybe able to facilitate TCU AND memory care needs.     Sandra Blackburn, MARIO  1:25 PM   10/06/23

## 2023-10-06 NOTE — PROGRESS NOTES
Moved pt to 423 from 424 due to need for zackary room. Writer updated pts daughter Alex on room change.

## 2023-10-06 NOTE — PLAN OF CARE
Goal Outcome Evaluation:  Patient had a good night. Alert and oriented to a certain extent and answers some questions appropriately. Pain well controlled with scheduled Tylenol, repositioning and icing. Dooley patent with 550 ml output.  IVF at 75ml/hr. Dressing left hip CDI.                      Problem: Plan of Care - These are the overarching goals to be used throughout the patient stay.    Goal: Optimal Comfort and Wellbeing  Outcome: Progressing     Problem: Plan of Care - These are the overarching goals to be used throughout the patient stay.    Goal: Optimal Comfort and Wellbeing  Outcome: Progressing     Problem: Pain Acute  Goal: Optimal Pain Control and Function  Outcome: Progressing

## 2023-10-07 ENCOUNTER — APPOINTMENT (OUTPATIENT)
Dept: PHYSICAL THERAPY | Facility: HOSPITAL | Age: 83
DRG: 522 | End: 2023-10-07
Payer: COMMERCIAL

## 2023-10-07 PROBLEM — D62 ANEMIA DUE TO BLOOD LOSS, ACUTE: Status: ACTIVE | Noted: 2023-10-07

## 2023-10-07 LAB
GLUCOSE BLDC GLUCOMTR-MCNC: 99 MG/DL (ref 70–99)
HGB BLD-MCNC: 8.9 G/DL (ref 11.7–15.7)

## 2023-10-07 PROCEDURE — 258N000003 HC RX IP 258 OP 636: Performed by: PHYSICIAN ASSISTANT

## 2023-10-07 PROCEDURE — 97110 THERAPEUTIC EXERCISES: CPT | Mod: GP

## 2023-10-07 PROCEDURE — 250N000013 HC RX MED GY IP 250 OP 250 PS 637: Performed by: INTERNAL MEDICINE

## 2023-10-07 PROCEDURE — 85018 HEMOGLOBIN: CPT | Performed by: PHYSICIAN ASSISTANT

## 2023-10-07 PROCEDURE — 250N000013 HC RX MED GY IP 250 OP 250 PS 637: Performed by: PHYSICIAN ASSISTANT

## 2023-10-07 PROCEDURE — 36415 COLL VENOUS BLD VENIPUNCTURE: CPT | Performed by: PHYSICIAN ASSISTANT

## 2023-10-07 PROCEDURE — 120N000001 HC R&B MED SURG/OB

## 2023-10-07 PROCEDURE — 99232 SBSQ HOSP IP/OBS MODERATE 35: CPT | Performed by: INTERNAL MEDICINE

## 2023-10-07 RX ADMIN — ACETAMINOPHEN 975 MG: 325 TABLET ORAL at 01:12

## 2023-10-07 RX ADMIN — SIMVASTATIN 40 MG: 10 TABLET, FILM COATED ORAL at 09:21

## 2023-10-07 RX ADMIN — ACETAMINOPHEN 975 MG: 325 TABLET ORAL at 17:31

## 2023-10-07 RX ADMIN — Medication 81 MG: at 20:31

## 2023-10-07 RX ADMIN — LOSARTAN POTASSIUM 50 MG: 50 TABLET, FILM COATED ORAL at 09:22

## 2023-10-07 RX ADMIN — SENNOSIDES AND DOCUSATE SODIUM 2 TABLET: 50; 8.6 TABLET ORAL at 09:21

## 2023-10-07 RX ADMIN — POLYETHYLENE GLYCOL 3350 17 G: 17 POWDER, FOR SOLUTION ORAL at 09:21

## 2023-10-07 RX ADMIN — SODIUM CHLORIDE, POTASSIUM CHLORIDE, SODIUM LACTATE AND CALCIUM CHLORIDE: 600; 310; 30; 20 INJECTION, SOLUTION INTRAVENOUS at 01:12

## 2023-10-07 RX ADMIN — ACETAMINOPHEN 975 MG: 325 TABLET ORAL at 09:21

## 2023-10-07 RX ADMIN — Medication 81 MG: at 09:21

## 2023-10-07 ASSESSMENT — ACTIVITIES OF DAILY LIVING (ADL)
ADLS_ACUITY_SCORE: 68
ADLS_ACUITY_SCORE: 70
ADLS_ACUITY_SCORE: 68
ADLS_ACUITY_SCORE: 70
ADLS_ACUITY_SCORE: 68
ADLS_ACUITY_SCORE: 66
ADLS_ACUITY_SCORE: 70

## 2023-10-07 NOTE — PLAN OF CARE
"  Problem: Plan of Care - These are the overarching goals to be used throughout the patient stay.    Goal: Plan of Care Review  Description: The Plan of Care Review/Shift note should be completed every shift.  The Outcome Evaluation is a brief statement about your assessment that the patient is improving, declining, or no change.  This information will be displayed automatically on your shift note.  Outcome: Progressing  Goal: Patient-Specific Goal (Individualized)  Description: You can add care plan individualizations to a care plan. Examples of Individualization might be:  \"Parent requests to be called daily at 9am for status\", \"I have a hard time hearing out of my right ear\", or \"Do not touch me to wake me up as it startles me\".  Outcome: Progressing  Goal: Absence of Hospital-Acquired Illness or Injury  Outcome: Progressing  Intervention: Identify and Manage Fall Risk  Recent Flowsheet Documentation  Taken 10/6/2023 1607 by Samantha Garcia, RN  Safety Promotion/Fall Prevention: activity supervised  Goal: Optimal Comfort and Wellbeing  Outcome: Progressing  Intervention: Monitor Pain and Promote Comfort  Recent Flowsheet Documentation  Taken 10/6/2023 1607 by Samantha Garcia, RN  Pain Management Interventions:   medication (see MAR)   cold applied  Goal: Readiness for Transition of Care  Outcome: Progressing   Goal Outcome Evaluation:       Pt is alert and has confusion, seems to have pain with positioning and movement,got PRN oxycodone and proven effective.IVF at 75ml/hr infusing. Incisional site dressing clean, dry and intact.                 "

## 2023-10-07 NOTE — PLAN OF CARE
Problem: Pain Acute  Goal: Optimal Pain Control and Function  Outcome: Progressing     Problem: Plan of Care - These are the overarching goals to be used throughout the patient stay.    Goal: Optimal Comfort and Wellbeing  Outcome: Progressing     Problem: Plan of Care - These are the overarching goals to be used throughout the patient stay.    Goal: Absence of Hospital-Acquired Illness or Injury  Intervention: Prevent Skin Injury  Recent Flowsheet Documentation  Taken 10/7/2023 0112 by Gael Latham RN  Body Position: heels elevated   Goal Outcome Evaluation:       Pt Alert, on LR IV fluids running at 75 ml/hr. She has denied pain so far this shift, okay with schedule pain meds and repositioning as needed. Right hip dressing C/D/I. Pt had an uneventful night, No behaviors noted, pt pleasant  & compliant with cares.

## 2023-10-07 NOTE — PROGRESS NOTES
[facility]    Medicine Progress Note - Hospitalist Service    Date of Admission:  10/4/2023    Assessment & Plan     84 y/o female with h/o HTN, CKD stage 3, dementia who resides in a care facility was sent to ED for multiple falls over last several days and difficulty transfers. Work up revealed R hip fracture.     Right hip fracture  S/p  multiple falls recently at her care facility  -CT head and CT C Spine no acute findings  -Reviewed hip xrays, revealed acutely displaced subcapital fracture of right hip  -Ortho following, planned to OR later today  -Pain control with tylenol, dilaudid  -Post op DVT ppx, diet and weight bearing status per ortho recommendations  -Will likely need TCU on discharge    Dementia   -Currently residing in a care facility  -Increased confusion per report on admission at her care facility  -CT head and CT C Spine no acute findings  -No evidence of any infection  -Monitor closely with pain management post-op, limit opoids ?    Constipation  -Continue currently bowel regimen with Senna and miralax    CT abdomen showed some opacities but Chest xray negative, stopped antibiotics now.    Hypertension - Stable  -Continue PTA losartan    Diet: Advance Diet as Tolerated: Regular Diet Adult  Discharge Instruction - Regular Diet Adult    DVT Prophylaxis: Pneumatic Compression Devices  Dooley Catheter: PRESENT, indication: Other (Comment) (surgery)  Lines: None     Cardiac Monitoring: None  Code Status: Full Code      Clinically Significant Risk Factors     # Hypoalbuminemia: Lowest albumin = 2.8 g/dL at 10/4/2023 12:03 PM, will monitor as appropriate    Disposition Plan      Expected Discharge Date: 10/08/2023    Discharge Delays: PT Disposition recs needed  OT Disposition recs needed           Julia Gaitan MD  Hospitalist Service  [facility]  Securely message with [employer] (more info)  Text page via [facility] Paging/Directory    ______________________________________________________________________    Interval History   No new complaints.  Vital stable.  Cooperative with therapy.  Recommended and likely available on Monday.  D/W RN and CM.    Physical Exam   Vital Signs: Temp: 98.4  F (36.9  C) Temp src: Oral BP: 129/60 Pulse: 103   Resp: 20 SpO2: 96 % O2 Device: None (Room air)    Weight: 143 lbs 1.26 oz  General: NAD  HEENT:EOMI, AT,NC  CVS:RRR, no edema  RS:CTAB  Neurology:Awake, alert, pleasantly confused.  Psy:Calm    Medical Decision Making     >40 MINUTES SPENT BY ME on the date of service doing chart review, history, exam, documentation & further activities per the note.      Plan d/w patient bedside RN, SW/CM    Data     I have personally reviewed the following data over the past 24 hrs:    N/A  \   8.9 (L)   / N/A     N/A N/A N/A /  99   N/A N/A N/A \

## 2023-10-07 NOTE — PLAN OF CARE
Problem: Plan of Care - These are the overarching goals to be used throughout the patient stay.    Goal: Optimal Comfort and Wellbeing  Outcome: Progressing   Goal Outcome Evaluation:               Pt appears comfortable, currently reclined in chair sleeping. Cont. With sched APAP & ice application. Has PRN oxycodone if needed.      Problem: Risk for Delirium  Goal: Improved Behavioral Control  Outcome: Progressing      Pt pleasant & cooperative, confused per baseline dementia dx.     Dooley pulled at approx. 1040. Will bladder scan if no void by 1440. Per pts daughter pt incontinent at baseline, purewick to be placed while in bed, brief on currently.

## 2023-10-07 NOTE — PROGRESS NOTES
Ortho Progress Note    Subjective:  Pain controlled. Denies numbness, tingling, chest pain, shortness of breath.     Objective:  Vital signs in last 24 hours  Temp:  [98  F (36.7  C)-98.4  F (36.9  C)] 98.4  F (36.9  C)  Pulse:  [] 103  Resp:  [18-20] 20  BP: (117-129)/(56-70) 129/60  SpO2:  [92 %-97 %] 96 %  General: resting in bed, alert, NAD  Resp: breathing nonlabored  RLE: Dressing is c/d/I without strikethrough. Fires quad, SILT femoral nerve. 5/5 TA/GSC/EHL. SILT DP/SP/tib. Palpable DP pulse, foot wwp.       Pertinent Labs   Lab Results: personally reviewed.   Recent Labs   Lab 10/04/23  1400   INR 1.22*     Recent Labs   Lab 10/07/23  0613   HGB 8.9*     No results for input(s): CREATININE in the last 168 hours.    Assessment: 2 Days Post-Op   Procedure(s):  HEMIARTHROPLASTY, HIP, BIPOLAR     Plan:   Pain Control: Continue per multimodal pain protocol. Minimize opioid pain medications as able.  Weight Bearing: Weight bearing as tolerated  Precautions: None  DVT Prophylaxis: Risk stratified DVT prophylaxis. Aspirin 81 mg BID x 4 weeks -- given history of recent falls want to minimize possibility of bleeding sequelae. Early ambulation and SCDs while in bed.  Diet: Advance diet as tolerated from orthopedic perspective  GI: Plan for aggressive bowel regimen to prevent constipation from opioid medications  Lines: HLIV once tolerating PO  PT/OT: Eval and treatment. Will follow up on recommendations.  Follow up:  2 weeks  Discharge plan: Pending PT/OT evaluation and ongoing workup by medical team    Jesus Storm MD  [facility]

## 2023-10-08 ENCOUNTER — APPOINTMENT (OUTPATIENT)
Dept: PHYSICAL THERAPY | Facility: HOSPITAL | Age: 83
DRG: 522 | End: 2023-10-08
Payer: COMMERCIAL

## 2023-10-08 PROCEDURE — 97110 THERAPEUTIC EXERCISES: CPT | Mod: GP

## 2023-10-08 PROCEDURE — 120N000001 HC R&B MED SURG/OB

## 2023-10-08 PROCEDURE — 250N000013 HC RX MED GY IP 250 OP 250 PS 637: Performed by: PHYSICIAN ASSISTANT

## 2023-10-08 PROCEDURE — 250N000013 HC RX MED GY IP 250 OP 250 PS 637: Performed by: INTERNAL MEDICINE

## 2023-10-08 PROCEDURE — 99232 SBSQ HOSP IP/OBS MODERATE 35: CPT | Performed by: INTERNAL MEDICINE

## 2023-10-08 PROCEDURE — 97530 THERAPEUTIC ACTIVITIES: CPT | Mod: GP

## 2023-10-08 RX ADMIN — ACETAMINOPHEN 975 MG: 325 TABLET ORAL at 09:00

## 2023-10-08 RX ADMIN — Medication 81 MG: at 09:01

## 2023-10-08 RX ADMIN — LOSARTAN POTASSIUM 50 MG: 50 TABLET, FILM COATED ORAL at 09:01

## 2023-10-08 RX ADMIN — SIMVASTATIN 40 MG: 10 TABLET, FILM COATED ORAL at 09:00

## 2023-10-08 RX ADMIN — Medication 81 MG: at 21:04

## 2023-10-08 RX ADMIN — SENNOSIDES AND DOCUSATE SODIUM 2 TABLET: 50; 8.6 TABLET ORAL at 21:05

## 2023-10-08 ASSESSMENT — ACTIVITIES OF DAILY LIVING (ADL)
ADLS_ACUITY_SCORE: 68
ADLS_ACUITY_SCORE: 64
ADLS_ACUITY_SCORE: 68
ADLS_ACUITY_SCORE: 64
ADLS_ACUITY_SCORE: 68
ADLS_ACUITY_SCORE: 64
ADLS_ACUITY_SCORE: 68
ADLS_ACUITY_SCORE: 68
ADLS_ACUITY_SCORE: 64
ADLS_ACUITY_SCORE: 68

## 2023-10-08 NOTE — PLAN OF CARE
Problem: Plan of Care - These are the overarching goals to be used throughout the patient stay.    Goal: Absence of Hospital-Acquired Illness or Injury  Intervention: Identify and Manage Fall Risk  Recent Flowsheet Documentation  Taken 10/7/2023 1630 by Dora Garcia RN  Safety Promotion/Fall Prevention:   activity supervised   assistive device/personal items within reach   clutter free environment maintained   nonskid shoes/slippers when out of bed   patient and family education   room door open   safety round/check completed     Problem: Risk for Delirium  Goal: Improved Behavioral Control  Intervention: Minimize Safety Risk  Recent Flowsheet Documentation  Taken 10/7/2023 1630 by Dora Garcia RN  Enhanced Safety Measures: room near unit station  Goal: Improved Attention and Thought Clarity  Intervention: Maximize Cognitive Function  Recent Flowsheet Documentation  Taken 10/7/2023 1630 by Dora Garcia RN  Sensory Stimulation Regulation:   quiet environment promoted   lighting decreased   care clustered  Reorientation Measures: clock in view     Problem: Pain Acute  Goal: Optimal Pain Control and Function  Intervention: Prevent or Manage Pain  Recent Flowsheet Documentation  Taken 10/7/2023 1630 by Dora Garcia RN  Sensory Stimulation Regulation:   quiet environment promoted   lighting decreased   care clustered  Medication Review/Management: medications reviewed     Problem: Plan of Care - These are the overarching goals to be used throughout the patient stay.    Goal: Absence of Hospital-Acquired Illness or Injury  Outcome: Progressing  Intervention: Identify and Manage Fall Risk  Recent Flowsheet Documentation  Taken 10/7/2023 1630 by Dora Garcia RN  Safety Promotion/Fall Prevention:   activity supervised   assistive device/personal items within reach   clutter free environment maintained   nonskid shoes/slippers when out of bed   patient and family education   room door  open   safety round/check completed     Problem: Risk for Delirium  Goal: Improved Behavioral Control  Outcome: Progressing  Intervention: Minimize Safety Risk  Recent Flowsheet Documentation  Taken 10/7/2023 1630 by Dora Garcia RN  Enhanced Safety Measures: room near unit station  Goal: Improved Attention and Thought Clarity  Outcome: Progressing  Intervention: Maximize Cognitive Function  Recent Flowsheet Documentation  Taken 10/7/2023 1630 by Dora Garcia RN  Sensory Stimulation Regulation:   quiet environment promoted   lighting decreased   care clustered  Reorientation Measures: clock in view     Problem: Pain Acute  Goal: Optimal Pain Control and Function  Outcome: Progressing  Intervention: Prevent or Manage Pain  Recent Flowsheet Documentation  Taken 10/7/2023 1630 by Dora Garcia RN  Sensory Stimulation Regulation:   quiet environment promoted   lighting decreased   care clustered  Medication Review/Management: medications reviewed   Goal Outcome Evaluation:

## 2023-10-08 NOTE — PLAN OF CARE
Problem: Plan of Care - These are the overarching goals to be used throughout the patient stay.    Goal: Optimal Comfort and Wellbeing  10/8/2023 1729 by Janet Powell RN  Outcome: Progressing  10/8/2023 1128 by Janet Powell RN  Outcome: Progressing   Goal Outcome Evaluation:  Denies pain. Appears comfortable. Has been sleeping intermittently. Ice pack applied to R hip area.       Problem: Risk for Delirium  Goal: Improved Behavioral Control  10/8/2023 1729 by Janet Powell RN  Outcome: Progressing  10/8/2023 1128 by Janet Powell RN  Outcome: Progressing        No behaviors noted. Pt calm, cooperative & pleasant.

## 2023-10-08 NOTE — PROGRESS NOTES
Orthopedic Progress Note    Subjective:  No acute events.     Objective:  /59 (BP Location: Right arm)   Pulse 103   Temp 98.5  F (36.9  C) (Oral)   Resp 20   Wt 64.1 kg (141 lb 5 oz)   LMP  (LMP Unknown)   SpO2 99%   BMI 24.26 kg/m    Alert, interactive  RLE dressing CDI  Wiggles toes, fires PF/DF  SILT distally    Assessment: 3 Days Post-Op  S/P Procedure(s):  HEMIARTHROPLASTY, HIP, BIPOLAR    Plan:   - Continue PT/OT  - Weightbearing status: WBAT RLE  - Anticoagulation: ASA 81mg BID in addition to SCDs, megan stockings and early ambulation.  - Discharge planning: pending progress, follow up with Dr. Oh 2 weeks post discharge.     Report completed by:  Leroy Trevino MD  Date: 10/8/2023  Time: 7:43 AM

## 2023-10-08 NOTE — PROGRESS NOTES
[facility]    Medicine Progress Note - Hospitalist Service    Date of Admission:  10/4/2023    Assessment & Plan     84 y/o female with h/o HTN, CKD stage 3, dementia who resides in a care facility was sent to ED for multiple falls over last several days and difficulty transfers. Work up revealed R hip fracture.     Right hip fracture  S/p  multiple falls recently at her care facility  -CT head and CT C Spine no acute findings  -Reviewed hip xrays, revealed acutely displaced subcapital fracture of right hip  -Ortho following, planned to OR later today  -Pain control with tylenol, dilaudid  -Post op DVT ppx, diet and weight bearing status per ortho recommendations  -TCU on discharge    Dementia   -Currently residing in a care facility  -Increased confusion per report on admission at her care facility  -CT head and CT C Spine no acute findings  -No evidence of any infection  -Monitor closely with pain management post-op, limit opoids ?    Constipation  -Continue currently bowel regimen with Senna and miralax    CT abdomen showed some opacities but Chest xray negative, stopped antibiotics now.    Hypertension - Stable  -Continue PTA losartan    Diet: Advance Diet as Tolerated: Regular Diet Adult  Discharge Instruction - Regular Diet Adult    DVT Prophylaxis: Pneumatic Compression Devices  Dooley Catheter: Not present  Lines: None     Cardiac Monitoring: None  Code Status: Full Code      Clinically Significant Risk Factors     # Hypoalbuminemia: Lowest albumin = 2.8 g/dL at 10/4/2023 12:03 PM, will monitor as appropriate    Disposition Plan      Expected Discharge Date: 10/08/2023    Discharge Delays: PT Disposition recs needed  OT Disposition recs needed           Julia Gaitan MD  Hospitalist Service  [facility]  Securely message with [employer] (more info)  Text page via [employer] Paging/Directory  "  ______________________________________________________________________    Interval History   No new complaints.  Vital stable.  Cooperative with therapy.  Answering in short sentences.  Knows that she had hip fracture, states \"I fell out of bed\".  TCU recommended and likely available on Monday.  D/W RN and CM.    Physical Exam   Vital Signs: Temp: 98.4  F (36.9  C) Temp src: Oral BP: 134/73 Pulse: 99   Resp: 18 SpO2: 97 % O2 Device: None (Room air)    Weight: 143 lbs 1.26 oz  General: NAD  HEENT:EOMI, AT,NC, laying in bed, answering in short sentences  CVS:RRR, no edema  RS:CTAB  Neurology:Awake, alert, pleasantly confused.  Psy:Calm    Medical Decision Making     >40 MINUTES SPENT BY ME on the date of service doing chart review, history, exam, documentation & further activities per the note.      Plan d/w patient bedside RN, SW/CM    Data       N/A  \   8.9 (L)   / N/A     N/A N/A N/A /  99   N/A N/A N/A \         "

## 2023-10-08 NOTE — PLAN OF CARE
Problem: Risk for Delirium  Goal: Improved Behavioral Control  Outcome: Progressing  Goal: Improved Sleep  Outcome: Progressing     Problem: Pain Acute  Goal: Optimal Pain Control and Function  Outcome: Progressing  Intervention: Prevent or Manage Pain  Recent Flowsheet Documentation  Taken 10/8/2023 0100 by Diane Encarnacion RN  Medication Review/Management: medications reviewed   Goal Outcome Evaluation:    POD 3: Right hemiarthroplasty     No issues overnight. Slept well. Turned and repositioned. Right mepilex dressing clean, dry and intact. No observed pain at rest. Purewick in place, voiding well. Urinary output 950 ml.

## 2023-10-08 NOTE — PLAN OF CARE
Problem: Plan of Care - These are the overarching goals to be used throughout the patient stay.    Goal: Optimal Comfort and Wellbeing  Outcome: Progressing   Goal Outcome Evaluation:         Pt has denied pain. Sched. APAP admin. For POD 3 R hip. Ice applied intermittently to area. Pt currently sleeping reclined in chair.       Problem: Risk for Delirium  Goal: Improved Behavioral Control  Outcome: Progressing   Hx dementia. Pt has been calm/compliant/pleasant. No behaviors noted. Aware of self, in hospital & that she broke her hip, unaware of month/year currently.

## 2023-10-09 ENCOUNTER — APPOINTMENT (OUTPATIENT)
Dept: PHYSICAL THERAPY | Facility: HOSPITAL | Age: 83
DRG: 522 | End: 2023-10-09
Payer: COMMERCIAL

## 2023-10-09 ENCOUNTER — APPOINTMENT (OUTPATIENT)
Dept: OCCUPATIONAL THERAPY | Facility: HOSPITAL | Age: 83
DRG: 522 | End: 2023-10-09
Payer: COMMERCIAL

## 2023-10-09 VITALS
SYSTOLIC BLOOD PRESSURE: 154 MMHG | HEART RATE: 92 BPM | DIASTOLIC BLOOD PRESSURE: 77 MMHG | TEMPERATURE: 97.9 F | BODY MASS INDEX: 23.5 KG/M2 | RESPIRATION RATE: 16 BRPM | OXYGEN SATURATION: 99 % | WEIGHT: 136.91 LBS

## 2023-10-09 PROCEDURE — 250N000013 HC RX MED GY IP 250 OP 250 PS 637: Performed by: INTERNAL MEDICINE

## 2023-10-09 PROCEDURE — 250N000013 HC RX MED GY IP 250 OP 250 PS 637: Performed by: PHYSICIAN ASSISTANT

## 2023-10-09 PROCEDURE — 97110 THERAPEUTIC EXERCISES: CPT | Mod: GP

## 2023-10-09 PROCEDURE — 99239 HOSP IP/OBS DSCHRG MGMT >30: CPT | Performed by: INTERNAL MEDICINE

## 2023-10-09 PROCEDURE — 97535 SELF CARE MNGMENT TRAINING: CPT | Mod: GO

## 2023-10-09 PROCEDURE — 97530 THERAPEUTIC ACTIVITIES: CPT | Mod: GP

## 2023-10-09 RX ORDER — OXYCODONE HYDROCHLORIDE 5 MG/1
2.5 TABLET ORAL EVERY 4 HOURS PRN
Qty: 10 TABLET | Refills: 0 | Status: SHIPPED | OUTPATIENT
Start: 2023-10-09

## 2023-10-09 RX ADMIN — ACETAMINOPHEN 650 MG: 325 TABLET ORAL at 09:08

## 2023-10-09 RX ADMIN — SIMVASTATIN 40 MG: 10 TABLET, FILM COATED ORAL at 08:59

## 2023-10-09 RX ADMIN — Medication 81 MG: at 09:08

## 2023-10-09 RX ADMIN — LOSARTAN POTASSIUM 50 MG: 50 TABLET, FILM COATED ORAL at 09:08

## 2023-10-09 ASSESSMENT — ACTIVITIES OF DAILY LIVING (ADL)
ADLS_ACUITY_SCORE: 68
ADLS_ACUITY_SCORE: 72
ADLS_ACUITY_SCORE: 72
ADLS_ACUITY_SCORE: 68

## 2023-10-09 NOTE — PLAN OF CARE
Occupational Therapy Discharge Summary    Reason for therapy discharge:    Discharged to transitional care facility.    Progress towards therapy goal(s). See goals on Care Plan in [facility] electronic health record for goal details.  Goals not met.  Barriers to achieving goals:   discharge from facility.    Therapy recommendation(s):    Continued therapy is recommended.  Rationale/Recommendations:  recommend continued OT services at TCU.    Sveta Coleman OTR/BETITO 10/9/23

## 2023-10-09 NOTE — PROGRESS NOTES
Physical Therapy Discharge Summary    Reason for therapy discharge:    Discharged to transitional care facility.    Progress towards therapy goal(s). See goals on Care Plan in [facility] electronic health record for goal details.  Goals not met.  Barriers to achieving goals:   discharge from facility.    Therapy recommendation(s):    Continued therapy is recommended.  Rationale/Recommendations:  TCU to progress functional mobility and strength.

## 2023-10-09 NOTE — PROGRESS NOTES
Orthopedic Progress Note    Subjective:  No acute events. Confused this morning and does not appropriately respond to questions but follows commands and is very pleasant.  No new questions/concerns    Objective:  BP (!) 154/77 (BP Location: Left arm)   Pulse 92   Temp 97.9  F (36.6  C) (Oral)   Resp 16   Wt 62.1 kg (136 lb 14.5 oz)   LMP  (LMP Unknown)   SpO2 99%   BMI 23.50 kg/m    Alert, interactive  RLE dressing CDI  Wiggles toes, fires PF/DF  SILT distally    Assessment: 3 Days Post-Op  S/P Procedure(s):  HEMIARTHROPLASTY, HIP, BIPOLAR    Plan:   - Continue PT/OT  - Weightbearing status: WBAT RLE  - Anticoagulation: ASA 81mg BID in addition to SCDs, megan stockings and early ambulation.  - Discharge planning: TCU possibly today.  Okay to discharge from an orthopedic standpoint.  follow up with Dr. Oh 2 weeks post discharge if able    ZORAN COUGHLIN PA-C

## 2023-10-09 NOTE — PROGRESS NOTES
Pt ate lunch prior to discharge & brief changed d/t urinary incontinence. JUANITA gave transport staff paperwork. Writer assisted with transferring pt to stretcher w/transport staff. Transport staff in possession of 2 bags of pts belongings. Pt discharged from hospital via stretcher with transport staff. Writer called report to Samina velázquez at [facility].

## 2023-10-09 NOTE — PLAN OF CARE
NURSING PROGRESS NOTE  Shift Summary      Date: October 9, 2023     Neuro/Musculoskeletal:  A&O to person and place. Disoriented to time and situation.   Cardiac:   VSS.    Respiratory:  Sating in the 90s on RA  GI/:  Adequate urine output.   Diet/Appetite:  Tolerating regular diet. Good appetite.  Activity:  Assist of 2 with mechanical lift   Pain:  Denies. No s/s of verbal or nonverbal pain observed. Applied ice packs to surgical site.  Skin:  No new deficits noted.         Pertinent Shift Updates:  Slept intermittently overnight. Denied pain, NAD. Ice packs to surgical site. Aquacel dressing CDI, no drainage noted.       Plan:  discharge to TCU or return to memory care.      Mallika Deutsch RN  .................................................... October 9, 2023   3:38 AM

## 2023-10-09 NOTE — PROGRESS NOTES
Care Management Discharge Note    Discharge Date: 10/09/2023       Discharge Disposition:  [facility]   Discharge Services:  n/a  Discharge DME:  n/a    Discharge Transportation: agency    Private pay costs discussed: Not applicable    Does the patient's insurance plan have a 3 day qualifying hospital stay waiver?  No    PAS Confirmation Code:  [id number])  Patient/family educated on Medicare website which has current facility and service quality ratings:  yes    Education Provided on the Discharge Plan:  yes  Persons Notified of Discharge Plans: yes  Patient/Family in Agreement with the Plan: yes      Handoff Referral Completed: Yes    Additional Information:  Pt accepted to [facility]; message left for admissions to confirm as well as inquire about transport time that is preferred. SW following.  9:08 AM    SW spoke with admissions who confirmed acceptance. Pt in need of transport. [facility] ride set for 1230 with a  window between [phone] (agreebale to private pay). All parties aware and agreeable to discharge plan.  10:25 AM    BRANDEN Diego  10/9/2023

## 2023-10-09 NOTE — DISCHARGE SUMMARY
[facility]  Hospitalist Discharge Summary      Date of Admission:  10/4/2023  Date of Discharge:  10/9/2023  Discharging Provider: Julia Gaitan MD  Discharge Service: Hospitalist Service    Discharge Diagnoses   Left hip fracture  Status post hemiarthroplasty of left hip  Recurrent falls  Advanced dementia  Slow transit constipation  Essential hypertension    Follow-ups Needed After Discharge   Follow-up Appointments    Follow Up Care     Please follow-up with Dr. Oh's team in 2 weeks at [facility].   Call our scheduling line at [phone] to make an appointment, if you do   not already have one scheduled.       Follow Up and recommended labs and tests     Follow up with Nursing home physician.  No follow up labs or test are   needed.      Discharge Disposition   Discharged to home  Condition at discharge: Stable    Hospital Course   82 y/o female with h/o HTN, CKD stage 3, advanced dementia who resides in a care facility was sent to ED for multiple falls over last several days prior to admission.  Work up in ED revealed right displaced femoral neck fracture after ground-level fall.     Right hip fracture  Unstable gait and balance  S/p  multiple falls recently at her care facility  CT head and CT C Spine without acute findings  S/p right hip bipolar hemiarthroplasty  Uncomplicated postoperative course  Pain control with Tylenol, oxycodone  Therapy recommended PT/OT at TCU     Advanced dementia without behavioral issues  Prior to admission was residing in healthcare facility.  Increased confusion per report on admission at her care facility  CT head and CT C Spine no acute findings  No evidence of any infection  Balance between postoperative pain optimization and neurocognitive side effects of opiates     Slow transit constipation  Acute on chronic, decreased mobility, opiates for postop pain contributing   On bowel regimen with Senna and miralax     CT abdomen showed some  "opacities in lower lungs, treated for pneumonia with antibiotics.  But Chest xray negative, stopped antibiotics.     Essential hypertension - Stable  BP controlled with losartan.    Consultations This Hospital Stay   CARE MANAGEMENT / SOCIAL WORK IP CONSULT  PHYSICAL THERAPY ADULT IP CONSULT  OCCUPATIONAL THERAPY ADULT IP CONSULT    Code Status   Full Code    Time Spent on this Encounter   I, Julia Gaitan MD, personally saw the patient today and spent greater than 30 minutes discharging this patient.       Julia Gaitan MD  [address] [address]  Phone: [phone]  Fax: [phone]  ______________________________________________________________________    Physical Exam   Vital Signs: Temp: 97.9  F (36.6  C) Temp src: Oral BP: (!) 154/77 Pulse: 92   Resp: 16 SpO2: 99 % O2 Device: None (Room air)    Weight: 136 lbs 14.49 oz  General: NAD  HEENT:EOMI, AT,NC, laying in bed, answering in short sentences  CVS:RRR, no edema  RS:CTAB  Neurology:Awake, alert, pleasantly confused.  Psy:Calm       Primary Care Physician   Sveta Emanuel    Discharge Orders      Reason for your hospital stay    S/p R hip hemiarthroplasty     When to call - Contact Surgeon Team    You may experience symptoms that require follow-up before your scheduled appointment. Refer to the \"Stoplight Tool\" for instructions on when to contact your Surgeon Team if you are concerned about pain control, blood clots, constipation, or if you are unable to urinate.     When to call - Reach out to Urgent Care    If you are not able to reach your Surgeon Team and you need immediate care, go to the Orthopedic Walk-in Clinic or Urgent Care at your Surgeon's office.  Do NOT go to the Emergency Room unless you have shortness of breath, chest pain, or other signs of a medical emergency.     When to call - Reasons to Call 911    Call 911 immediately if you experience sudden-onset chest pain, arm " weakness/numbness, slurred speech, or shortness of breath     Discharge Instruction - Breathing exercises    Perform breathing exercises using your Incentive Spirometer 10 times per hour while awake for 2 weeks.     Symptoms - Fever Management    A low grade fever can be expected after surgery.  Use acetaminophen (TYLENOL) as needed for fever management.  Contact your Surgeon Team if you have a fever greater than 101.5 F, chills, and/or night sweats.     Symptoms - Constipation management    Constipation (hard, dry bowel movements) is expected after surgery due to the combination of being less active, the anesthetic, and the opioid pain medication.  You can do the following to help reduce constipation:  ~  FLUIDS:  Drink clear liquids (water or Gatorade), or juice (apple/prune).  ~  DIET:  Eat a fiber rich diet.    ~  ACTIVITY:  Get up and move around several times a day.  Increase your activity as you are able.  MEDICATIONS:  Reduce the risk of constipation by starting medications before you are constipated.  You can take Miralax   (1 packet as directed) and/or a stool softener (Senokot 1-2 tablets 1-2 times a day).  If you already have constipation and these medications are not working, you can get magnesium citrate and use as directed.  If you continue to have constipation you can try an over the counter suppository or enema.  Call your Surgeon Team if it has been greater than 3 days since your last bowel movement.     Symptoms - Reduced Urine Output    Changes in the amount of fluids you drank before and after surgery may result in problems urinating.  It is important to stay well-hydrated after surgery and drink plenty of water. If it has been greater than 8 hours since you have urinated despite drinking plenty of water, call your Surgeon Team.     Activity - Exercises to prevent blood clots    Unless otherwise directed by your Surgeon team, perform the following exercises at least three times per day for the  first four weeks after surgery to prevent blood clots in your legs: 1) Point and flex your feet (Ankle Pumps), 2) Move your ankle around in big circles, 3) Wiggle your toes, 4) Walk, even for short distances, several times a day, will help decrease the risk of blood clots.     Comfort and Pain Management - Pain after Surgery    Pain after surgery is normal and expected.  You will have some amount of pain for several weeks after surgery.  Your pain will improve with time.  There are several things you can do to help reduce your pain including: rest, ice, elevation, and using pain medications as needed. Contact your Surgeon Team if you have pain that persists or worsens after surgery despite rest, ice, elevation, and taking your medication(s) as prescribed. Contact your Surgeon Team if you have new numbness, tingling, or weakness in your operative extremity.     Comfort and Pain Management - Swelling after Surgery    Swelling and/or bruising of the surgical extremity is common and may persist for several months after surgery. In addition to frequent icing and elevation, gentle compressive support with an ACE wrap or tubigrip may help with swelling. Apply compression regularly, removing at least twice daily to perform skin checks. Contact your Surgeon Team if your swelling increases and is NOT associated with an increase in your activity level, or if your swelling increases and is associated with redness and pain.     Comfort and Pain Management - Cold therapy    Ice can be used to control swelling and discomfort after surgery. Place a thin towel over your operative site and apply the ice pack overtop. Leave ice pack in place for 20 minutes, then remove for 20 minutes. Repeat this 20 minutes on/20 minutes off routine as often as tolerated.     Medication Instructions - Acetaminophen (TYLENOL) Instructions    You were discharged with acetaminophen (TYLENOL) for pain management after surgery. Acetaminophen most  "effectively manages pain symptoms when it is taken on a schedule without missing doses (every four, six, or eight hours). Your Provider will prescribe a safe daily dose between 3000 - 4000 mg.  Do NOT exceed this daily dose. Most patients use acetaminophen for pain control for the first four weeks after surgery.  You can wean from this medication as your pain decreases.     Medication Instructions - Opioids - Tapering Instructions    In the first three days following surgery, your symptoms may warrant use of the narcotic pain medication every four to six hours as prescribed. This is normal. As your pain symptoms improve, focus your efforts on decreasing (tapering) use of narcotic medications. The most successful tapering strategy is to first, decrease the number of tablets you take every 4-6 hours to the minimum prescribed. Then, increase the amount of time between doses.  For example:  First, taper to   or 1 tablet every 4-6 hours.  Then, taper to   or 1 tablet every 6-8 hours.  Then, taper to   or 1 tablet every 8-10 hours.  Then, taper to   or 1 tablet every 10-12 hours.  Then, taper to   or 1 tablet at bedtime.  The bedtime dose can help with comfort during sleep and is typically the last dose to be discontinued after surgery.     Follow Up Care    Please follow-up with Dr. Oh's team in 2 weeks at [facility]. Call our scheduling line at [phone] to make an appointment, if you do not already have one scheduled.     Medication instructions -  Anticoagulation - aspirin    Take the aspirin as prescribed for a total of four weeks after surgery.  This is given to help minimize your risk of blood clot.     Comfort and Pain Management - LOWER Extremity Elevation    Swelling is expected for several months after surgery. This type of swelling is usually associated with gravity and activity, and can be improved with elevation.   The best way to do this is to get your \"toes above your nose\" by laying down and " placing several pillows lengthwise under your calf and heel. When elevating your leg keep your knee completely straight. Perform this elevation as often as possible especially for the first two weeks after surgery.     Medication Instructions - Opioid Instructions (0.5 - 1 tablet Q 4-6 hours, MAX 4 tablets)    You were discharged with an opioid medication (hydromorphone, oxycodone, hydrocodone, or tramadol). This medication should only be taken for breakthrough pain that is not controlled with acetaminophen (TYLENOL). If you rate your pain less than 3 you do not need this medication.  Pain rating 0-3:  You do not need this medication  Pain rating 4-6:  Take 1/2 tablet every 4-6 hours as needed  Pain rating 7-10:  Take 1 tablet every 4-6 hours as needed  Do not exceed 4 tablets per day     Activity    Activity as tolerated     Return to Driving    Return to driving - Driving is NOT permitted until directed by your provider. Under no circumstance are you permitted to drive while using narcotic pain medications.     Weight bearing as tolerated    Weight bearing as tolerated on your operative extremity.     Dressing / Wound Care - Wound    You have a clean dressing on your surgical wound. Dressing change instructions as follows: dressing will be removed at your follow-up appointment. Contact your Surgeon Team if you have increased redness, warmth around the surgical wound, and/or drainage from the surgical wound.     Dressing / Wound care - Shower with wound/dressing covered    You must COVER your dressing or incision with saran wrap (or any other non-permeable covering) to allow the incision to remain dry while showering.  You may shower 2 days after surgery as long as the surgical wound stays dry. Continue to cover your dressing or incision for showering until your first office visit.  You are strictly prohibited from soaking or submerging the surgical wound underwater.     Dressing / Wound Care - NO Tub Bathing     Tub bathing, swimming, or any other activities that will cause your incision to be submerged in water should be avoided until allowed by your Surgeon.     No flexion past 90 degrees    No bending at waist past 90 degrees.     No internal rotation    No pivoting on your operative leg.     No adduction past midline    No crossing your operative leg.     General info for SNF    Length of Stay Estimate: Short Term Care: Estimated # of Days 31-90  Condition at Discharge: Stable  Level of care:skilled   Rehabilitation Potential: Fair  Admission H&P remains valid and up-to-date: Yes  Recent Chemotherapy: N/A  Use Nursing Home Standing Orders: Yes     Mantoux instructions    Give two-step Mantoux (PPD) Per Facility Policy Yes     Follow Up and recommended labs and tests    Follow up with Nursing home physician.  No follow up labs or test are needed.     Reason for your hospital stay    Ground level fall, hip fracture     Activity - Up with assistive device     Activity - Up with nursing assistance     Physical Therapy Adult Consult    Evaluate and treat as clinically indicated.    Reason: Impaired gait and mobility, fall, hip fracture     Occupational Therapy Adult Consult    Evaluate and treat as clinically indicated.    Reason: Advanced dementia, ADLs     Fall precautions     Hip precautions     Crutches DME    DME Documentation: Describe the reason for need to support medical necessity: Impaired gait status post hip surgery. I, the undersigned, certify that the above prescribed supplies are medically necessary for this patient and is both reasonable and necessary in reference to accepted standards of medical practice in the treatment of this patient's condition and is not prescribed as a convenience.     [facility]    DME Documentation: Describe the reason for need to support medical necessity: Impaired gait status post hip surgery. I, the undersigned, certify that the above prescribed supplies are medically necessary for  this patient and is both reasonable and necessary in reference to accepted standards of medical practice in the treatment of this patient's condition and is not prescribed as a convenience.     [facility]    : DME Documentation: Describe the reason for need to support medical necessity: Impaired gait status post hip surgery. I, the undersigned, certify that the above prescribed supplies are medically necessary for this patient and is both reasonable and necessary in reference to accepted standards of medical practice in the treatment of this patient's condition and is not prescribed as a convenience.     Discharge Instruction - Regular Diet Adult    Return to your pre-surgery diet unless instructed otherwise     Diet    Follow this diet upon discharge: Orders Placed This Encounter      Advance Diet as Tolerated: Regular Diet Adult      Discharge Instruction - Regular Diet Adult     Significant Results and Procedures   Results for orders placed or performed during the hospital encounter of 10/04/23   CT Abdomen Pelvis w Contrast    Narrative    EXAM: CT ABDOMEN PELVIS W CONTRAST  LOCATION: [facility]  DATE: 10/4/2023    INDICATION: Dementia, reported abdominal pain from care facility  COMPARISON: 5/20/2023  TECHNIQUE: CT scan of the abdomen and pelvis was performed following injection of IV contrast. Multiplanar reformats were obtained. Dose reduction techniques were used.  CONTRAST: 66 mL Isovue 370    FINDINGS:   LOWER CHEST: Trace right pleural effusion with associated airspace opacities. Partially visualized coronary artery calcifications.    HEPATOBILIARY: No significant mass or bile duct dilatation. No calcified gallstones.     PANCREAS: Normal.    SPLEEN: Punctate calcified granulomas.    ADRENAL GLANDS: Normal.    KIDNEYS/BLADDER: Unchanged mild left pelvicalyceal fullness with no obstructing stone or lesion. Normal right kidney and bladder.    BOWEL: Diverticulosis of the colon. No  acute inflammatory change. No obstruction. Large amount stool throughout the colon.    LYMPH NODES: Normal.    VASCULATURE: No abdominal aortic aneurysm.    PELVIC ORGANS: Coarse calcified fibroid.    MUSCULOSKELETAL: Left cannulated screws. Mildly displaced and angulated right femoral neck fracture, new since 5/20/2023.      Impression    IMPRESSION:   1.  New mildly displaced right femoral neck fracture.    2.  Large amount of retained stool in the colon, which could indicate constipation. No acute inflammation or obstruction.    3.  Tiny left pleural effusion with associated airspace opacities.    Dr. Howell was contacted by me on 10/4/2023 1:31 PM CDT and verbalized understanding of the result.   Head CT w/o contrast    Narrative    EXAM: CT HEAD W/O CONTRAST, CT CERVICAL SPINE W/O CONTRAST  LOCATION: [facility]  DATE: 10/4/2023    INDICATION: multiple falls dementia. Mental status change. Midline neck pain following traumatic injury to the neck.  COMPARISON: 05/20/2023.  TECHNIQUE: Routine CT Head and cervical spine without IV contrast. Multiplanar reformats. Dose reduction techniques were used.    FINDINGS:  INTRACRANIAL CONTENTS: No evidence of intracranial hemorrhage. Incidentally noted small 1 cm meningioma along the right aspect of the anterior falx (series 504 image #27). This finding was present on previous CT scan.    No evidence of acute infarct. Chronic small vessel change throughout the deep white matter of both cerebral hemispheres with patchy areas of low attenuation throughout the subcortical and deep white matter of both cerebral hemispheres. No evidence of   acute infarct. Posterior fossa structures including cerebellar hemispheres, fourth ventricle and region of CP angle cisterns are clear.    No CT evidence of acute infarct. Mild generalized volume loss. No hydrocephalus.     VISUALIZED ORBITS/SINUSES/MASTOIDS: Prior bilateral cataract surgery. Visualized portions of  the orbits are otherwise unremarkable. No paranasal sinus mucosal disease. No middle ear or mastoid effusion.    CT cervical spine:    No evidence of cervical spine fracture or prevertebral soft tissue swelling.    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 C2-C3 degenerative change at C3-C4 with bilateral foraminal stenosis. Degenerative change at C4-C5 with 2 mm of anterior degenerative subluxation and narrowing of the right foramen. Degenerative change at C5-C6 with   broad-based central ventral osteophyte/disc complex, bilateral uncovertebral joint and facet hypertrophic change and bilateral foraminal narrowing. Degenerative change at C6-C7 with left paracentral ventral osteophyte/disc complex, bilateral   uncovertebral joint and facet hypertrophic change and mild narrowing of the left foramen. Degenerative change at C6-C7 with central ventral osteophyte/disc complex and bilateral uncovertebral joint hypertrophic change.          Impression    IMPRESSION:  1.  No CT evidence for acute intracranial process.  2.  Brain atrophy and presumed chronic microvascular ischemic changes as above.    3.  1 cm benign parafalcine meningioma along the anterior right aspect of the falx as seen on previous CT scan.    4.  Degenerative change throughout the cervical spine with no evidence of fracture.   Cervical spine CT w/o contrast    Narrative    EXAM: CT HEAD W/O CONTRAST, CT CERVICAL SPINE W/O CONTRAST  LOCATION: [facility]  DATE: 10/4/2023    INDICATION: multiple falls dementia. Mental status change. Midline neck pain following traumatic injury to the neck.  COMPARISON: 05/20/2023.  TECHNIQUE: Routine CT Head and cervical spine without IV contrast. Multiplanar reformats. Dose reduction techniques were used.    FINDINGS:  INTRACRANIAL CONTENTS: No evidence of intracranial hemorrhage. Incidentally noted small 1 cm meningioma along the right aspect of the anterior falx (series 504 image #27). This finding was present  on previous CT scan.    No evidence of acute infarct. Chronic small vessel change throughout the deep white matter of both cerebral hemispheres with patchy areas of low attenuation throughout the subcortical and deep white matter of both cerebral hemispheres. No evidence of   acute infarct. Posterior fossa structures including cerebellar hemispheres, fourth ventricle and region of CP angle cisterns are clear.    No CT evidence of acute infarct. Mild generalized volume loss. No hydrocephalus.     VISUALIZED ORBITS/SINUSES/MASTOIDS: Prior bilateral cataract surgery. Visualized portions of the orbits are otherwise unremarkable. No paranasal sinus mucosal disease. No middle ear or mastoid effusion.    CT cervical spine:    No evidence of cervical spine fracture or prevertebral soft tissue swelling.    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 C2-C3 degenerative change at C3-C4 with bilateral foraminal stenosis. Degenerative change at C4-C5 with 2 mm of anterior degenerative subluxation and narrowing of the right foramen. Degenerative change at C5-C6 with   broad-based central ventral osteophyte/disc complex, bilateral uncovertebral joint and facet hypertrophic change and bilateral foraminal narrowing. Degenerative change at C6-C7 with left paracentral ventral osteophyte/disc complex, bilateral   uncovertebral joint and facet hypertrophic change and mild narrowing of the left foramen. Degenerative change at C6-C7 with central ventral osteophyte/disc complex and bilateral uncovertebral joint hypertrophic change.          Impression    IMPRESSION:  1.  No CT evidence for acute intracranial process.  2.  Brain atrophy and presumed chronic microvascular ischemic changes as above.    3.  1 cm benign parafalcine meningioma along the anterior right aspect of the falx as seen on previous CT scan.    4.  Degenerative change throughout the cervical spine with no evidence of fracture.   XR Pelvis and Hip Right 2 Views    Narrative    EXAM: XR  "PELVIS AND HIP RIGHT 2 VIEWS  LOCATION: [facility] [place]  DATE: 10/4/2023    INDICATION: Right hip injury and pain.  COMPARISON: 10/04/2023 radiographs.      Impression    IMPRESSION:   1.  Acute appearing fracture of the right subcapital femoral neck. Mild impaction and mild/moderate superolateral displacement.  2.  Normal right hip joint spacing and alignment.  3.  ORIF healed left proximal femur fracture with screw fixation.  4.  Contrast in the bladder.    XR Chest 1 View    Narrative    EXAM: XR CHEST 1 VIEW  LOCATION: [facility] [place]  DATE: 10/4/2023    INDICATION: falls, preop  COMPARISON: 3/11/2023      Impression    IMPRESSION: Negative chest.   XR Femur Right 2 Views    Narrative    EXAM: XR FEMUR RIGHT 2 VIEWS  LOCATION: [facility] [place]  DATE: 10/5/2023    INDICATION: Right hip pain. Fracture.  COMPARISON: None.      Impression    IMPRESSION: Acute moderately displaced subcapital fracture of the right femur with. Displacement of the femoral shaft. Osteopenia. Mild degenerative changes in the right hip.   POC US Guidance Needle Placement    Narrative    Ultrasound was performed as guidance to an anesthesia procedure.  Click   \"PACS images\" hyperlink below to view any stored images.  For specific   procedure details, view procedure note authored by anesthesia.   POC US Guidance Needle Placement    Narrative    Ultrasound was performed as guidance to an anesthesia procedure.  Click   \"PACS images\" hyperlink below to view any stored images.  For specific   procedure details, view procedure note authored by anesthesia.   XR Pelvis 1/2 Views    Narrative    EXAM: XR PELVIS 1/2 VIEWS  LOCATION: [facility] [place]  DATE: 10/5/2023    INDICATION: Right hip hemiarthroplasty.  COMPARISON: 10/05/2023 radiographs.      Impression    IMPRESSION: There is a new bipolar right hip hemiarthroplasty. Excellent position and alignment of components. " There is no evidence of complication or periprosthetic fracture. No other change.     Discharge Medications   Current Discharge Medication List        START taking these medications    Details   oxyCODONE (ROXICODONE) 5 MG tablet Take 0.5 tablets (2.5 mg) by mouth every 4 hours as needed  Qty: 10 tablet, Refills: 0    Associated Diagnoses: Hip fracture, left, closed, initial encounter (H)      senna-docusate (SENOKOT-S/PERICOLACE) 8.6-50 MG tablet Take 1 tablet by mouth 2 times daily  Qty: 30 tablet, Refills: 0    Associated Diagnoses: Hip fracture, left, closed, initial encounter (H)           CONTINUE these medications which have CHANGED    Details   acetaminophen (TYLENOL) 325 MG tablet Take 3 tablets (975 mg) by mouth every 8 hours  Qty: 60 tablet, Refills: 0    Associated Diagnoses: Hip fracture, left, closed, initial encounter (H)           CONTINUE these medications which have NOT CHANGED    Details   aspirin (ASA) 81 MG chewable tablet Take 2 tablets (162 mg) by mouth daily  Qty: 28 tablet, Refills: 0    Associated Diagnoses: Hip fracture, left, closed, initial encounter (H)      glucosamine-chondroitin 500-400 MG CAPS per capsule Take 1 capsule by mouth daily      losartan (COZAAR) 50 MG tablet Take 1 tablet (50 mg) by mouth daily  Qty: 30 tablet, Refills: 0    Associated Diagnoses: Hypertension, unspecified type      multivitamin w/minerals (CERTAVITE/ANTIOXIDANTS) tablet Take 1 tablet by mouth daily  Qty: 30 tablet, Refills: 0    Associated Diagnoses: Hip fracture, left, closed, initial encounter (H)      simvastatin (ZOCOR) 40 MG tablet Take 1 tablet (40 mg) by mouth daily  Qty: 30 tablet, Refills: 0    Associated Diagnoses: Hyperlipidemia LDL goal <100           Allergies   Allergies   Allergen Reactions    Other Food Allergy Unknown     Beef

## 2023-10-10 ENCOUNTER — PATIENT OUTREACH (OUTPATIENT)
Dept: CARE COORDINATION | Facility: CLINIC | Age: 83
End: 2023-10-10
Payer: COMMERCIAL

## 2023-10-10 NOTE — PROGRESS NOTES
[facility]    Background: Transitional Care Management program identified per system criteria and reviewed by [facility] team for possible outreach.    Assessment: Upon chart review, CCRC Team member will not proceed with patient outreach related to this episode of Transitional Care Management program due to reason below:    Non-[facility] TCU: CCRC team member noted patient discharged to TCU/ARU/LTACH. Patient is not established with a [facility] currently supported by Primary Care-Care Coordination therefore handoff to Primary Care-Care Coordination is not appropriate at this time.    Plan: Transitional Care Management episode addressed appropriately per reason noted above.      Erin Spence MA  [facility], [facility]    *Connected Care Resource Team does NOT follow patient ongoing. Referrals are identified based on internal discharge reports and the outreach is to ensure patient has an understanding of their discharge instructions.

## 2023-10-23 ENCOUNTER — LAB REQUISITION (OUTPATIENT)
Dept: LAB | Facility: CLINIC | Age: 83
End: 2023-10-23
Payer: COMMERCIAL

## 2023-10-23 DIAGNOSIS — D64.9 ANEMIA, UNSPECIFIED: ICD-10-CM

## 2023-10-25 LAB
ERYTHROCYTE [DISTWIDTH] IN BLOOD BY AUTOMATED COUNT: 13.9 % (ref 10–15)
HCT VFR BLD AUTO: 31.3 % (ref 35–47)
HGB BLD-MCNC: 10.3 G/DL (ref 11.7–15.7)
MCH RBC QN AUTO: 28.4 PG (ref 26.5–33)
MCHC RBC AUTO-ENTMCNC: 32.9 G/DL (ref 31.5–36.5)
MCV RBC AUTO: 86 FL (ref 78–100)
PLATELET # BLD AUTO: 307 10E3/UL (ref 150–450)
RBC # BLD AUTO: 3.63 10E6/UL (ref 3.8–5.2)
WBC # BLD AUTO: 6.5 10E3/UL (ref 4–11)

## 2023-10-25 PROCEDURE — 36415 COLL VENOUS BLD VENIPUNCTURE: CPT | Mod: ORL

## 2023-10-25 PROCEDURE — 85027 COMPLETE CBC AUTOMATED: CPT | Mod: ORL

## 2023-10-25 PROCEDURE — P9604 ONE-WAY ALLOW PRORATED TRIP: HCPCS | Mod: ORL

## 2023-11-30 ENCOUNTER — MEDICAL CORRESPONDENCE (OUTPATIENT)
Dept: HEALTH INFORMATION MANAGEMENT | Facility: CLINIC | Age: 83
End: 2023-11-30

## 2023-12-28 ENCOUNTER — APPOINTMENT (OUTPATIENT)
Dept: RADIOLOGY | Facility: HOSPITAL | Age: 83
End: 2023-12-28
Attending: FAMILY MEDICINE
Payer: COMMERCIAL

## 2023-12-28 ENCOUNTER — APPOINTMENT (OUTPATIENT)
Dept: CT IMAGING | Facility: HOSPITAL | Age: 83
End: 2023-12-28
Attending: FAMILY MEDICINE
Payer: COMMERCIAL

## 2023-12-28 ENCOUNTER — HOSPITAL ENCOUNTER (EMERGENCY)
Facility: HOSPITAL | Age: 83
Discharge: NURSING, REHAB OR LONG-TERM CARE | End: 2023-12-28
Attending: FAMILY MEDICINE | Admitting: FAMILY MEDICINE
Payer: COMMERCIAL

## 2023-12-28 VITALS
TEMPERATURE: 98.2 F | OXYGEN SATURATION: 97 % | SYSTOLIC BLOOD PRESSURE: 168 MMHG | HEART RATE: 91 BPM | RESPIRATION RATE: 26 BRPM | DIASTOLIC BLOOD PRESSURE: 73 MMHG
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
## 2023-12-28 DIAGNOSIS — Z96.641 S/P TOTAL RIGHT HIP ARTHROPLASTY: ICD-10-CM

## 2023-12-28 DIAGNOSIS — F01.C0 SEVERE VASCULAR DEMENTIA WITHOUT BEHAVIORAL DISTURBANCE, PSYCHOTIC DISTURBANCE, MOOD DISTURBANCE, OR ANXIETY (H): ICD-10-CM

## 2023-12-28 DIAGNOSIS — S00.83XA FOREHEAD CONTUSION, INITIAL ENCOUNTER: ICD-10-CM

## 2023-12-28 LAB
ALBUMIN UR-MCNC: 70 MG/DL
ANION GAP SERPL CALCULATED.3IONS-SCNC: 10 MMOL/L (ref 7–15)
APPEARANCE UR: CLEAR
BASOPHILS # BLD AUTO: 0 10E3/UL (ref 0–0.2)
BASOPHILS NFR BLD AUTO: 0 %
BILIRUB UR QL STRIP: NEGATIVE
BUN SERPL-MCNC: 27.3 MG/DL (ref 8–23)
CALCIUM SERPL-MCNC: 10.3 MG/DL (ref 8.8–10.2)
CHLORIDE SERPL-SCNC: 104 MMOL/L (ref 98–107)
COLOR UR AUTO: YELLOW
CREAT SERPL-MCNC: 1.36 MG/DL (ref 0.51–0.95)
DEPRECATED HCO3 PLAS-SCNC: 24 MMOL/L (ref 22–29)
EGFRCR SERPLBLD CKD-EPI 2021: 39 ML/MIN/1.73M2
EOSINOPHIL # BLD AUTO: 0 10E3/UL (ref 0–0.7)
EOSINOPHIL NFR BLD AUTO: 0 %
ERYTHROCYTE [DISTWIDTH] IN BLOOD BY AUTOMATED COUNT: 14 % (ref 10–15)
GLUCOSE SERPL-MCNC: 146 MG/DL (ref 70–99)
GLUCOSE UR STRIP-MCNC: NEGATIVE MG/DL
HCT VFR BLD AUTO: 38.1 % (ref 35–47)
HGB BLD-MCNC: 12.3 G/DL (ref 11.7–15.7)
HGB UR QL STRIP: NEGATIVE
IMM GRANULOCYTES # BLD: 0.1 10E3/UL
IMM GRANULOCYTES NFR BLD: 1 %
KETONES UR STRIP-MCNC: NEGATIVE MG/DL
LEUKOCYTE ESTERASE UR QL STRIP: NEGATIVE
LYMPHOCYTES # BLD AUTO: 0.7 10E3/UL (ref 0.8–5.3)
LYMPHOCYTES NFR BLD AUTO: 5 %
MAGNESIUM SERPL-MCNC: 2.1 MG/DL (ref 1.7–2.3)
MCH RBC QN AUTO: 28.4 PG (ref 26.5–33)
MCHC RBC AUTO-ENTMCNC: 32.3 G/DL (ref 31.5–36.5)
MCV RBC AUTO: 88 FL (ref 78–100)
MONOCYTES # BLD AUTO: 1.4 10E3/UL (ref 0–1.3)
MONOCYTES NFR BLD AUTO: 10 %
MUCOUS THREADS #/AREA URNS LPF: PRESENT /LPF
NEUTROPHILS # BLD AUTO: 11.8 10E3/UL (ref 1.6–8.3)
NEUTROPHILS NFR BLD AUTO: 84 %
NITRATE UR QL: NEGATIVE
NRBC # BLD AUTO: 0 10E3/UL
NRBC BLD AUTO-RTO: 0 /100
PH UR STRIP: 5.5 [PH] (ref 5–7)
PLATELET # BLD AUTO: 213 10E3/UL (ref 150–450)
POTASSIUM SERPL-SCNC: 4.7 MMOL/L (ref 3.4–5.3)
RBC # BLD AUTO: 4.33 10E6/UL (ref 3.8–5.2)
RBC URINE: 0 /HPF
SODIUM SERPL-SCNC: 138 MMOL/L (ref 135–145)
SP GR UR STRIP: 1.03 (ref 1–1.03)
TROPONIN T SERPL HS-MCNC: 33 NG/L
TROPONIN T SERPL HS-MCNC: 34 NG/L
UROBILINOGEN UR STRIP-MCNC: <2 MG/DL
WBC # BLD AUTO: 14 10E3/UL (ref 4–11)
WBC URINE: 2 /HPF

## 2023-12-28 PROCEDURE — 84484 ASSAY OF TROPONIN QUANT: CPT | Performed by: FAMILY MEDICINE

## 2023-12-28 PROCEDURE — 73552 X-RAY EXAM OF FEMUR 2/>: CPT | Mod: RT

## 2023-12-28 PROCEDURE — 80048 BASIC METABOLIC PNL TOTAL CA: CPT | Performed by: FAMILY MEDICINE

## 2023-12-28 PROCEDURE — 81001 URINALYSIS AUTO W/SCOPE: CPT | Performed by: FAMILY MEDICINE

## 2023-12-28 PROCEDURE — 93005 ELECTROCARDIOGRAM TRACING: CPT | Performed by: FAMILY MEDICINE

## 2023-12-28 PROCEDURE — 72170 X-RAY EXAM OF PELVIS: CPT

## 2023-12-28 PROCEDURE — 85025 COMPLETE CBC W/AUTO DIFF WBC: CPT | Performed by: FAMILY MEDICINE

## 2023-12-28 PROCEDURE — 99285 EMERGENCY DEPT VISIT HI MDM: CPT | Mod: 25

## 2023-12-28 PROCEDURE — 72125 CT NECK SPINE W/O DYE: CPT

## 2023-12-28 PROCEDURE — 36415 COLL VENOUS BLD VENIPUNCTURE: CPT | Performed by: FAMILY MEDICINE

## 2023-12-28 PROCEDURE — 83735 ASSAY OF MAGNESIUM: CPT | Performed by: FAMILY MEDICINE

## 2023-12-28 PROCEDURE — 70450 CT HEAD/BRAIN W/O DYE: CPT

## 2023-12-28 ASSESSMENT — ACTIVITIES OF DAILY LIVING (ADL)
ADLS_ACUITY_SCORE: 35
ADLS_ACUITY_SCORE: 35

## 2023-12-28 NOTE — ED PROVIDER NOTES
EMERGENCY DEPARTMENT ENCOUNTER      NAME: Kim Ybarra  AGE: 83 year old female  YOB: 1940  MRN: [id number]  EVALUATION DATE & TIME: 12/28/2023  7:51 AM    PCP: Sveta Emanuel    ED PROVIDER: Bernabe Sinha M.D.    Chief Complaint   Patient presents with    Fall       FINAL IMPRESSION:  1. Forehead contusion, initial encounter    2. Fall, initial encounter    3. 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 S/P total right hip arthroplasty        ED COURSE & MEDICAL DECISION MAKING:    Pertinent Labs & Imaging studies independently interpreted by me. (See chart for details)  7:53 AM  Patient seen and examined, history from primary nurse and EMS, patient has fairly dense dementia and is unable to provide effective history.  Patient presents today after an unwitnessed fall at [facility].  Unable to provide any history.  She has a hematoma on the left forehead, CT scan of the head and neck ordered.  Also repeatedly says she has a right hip fracture, however no pain with straight leg raise.  Reviewed prior home care visit from December 11 when primary care provider saw her from follow-up from right femoral shaft fracture as well as complaints of constipation which appears to be fairly chronic.  Also reviewed prior admission from October 4 when patient was admitted with a right hip fracture and underwent hemiarthroplasty.  X-ray will be ordered due to patient concern but clinically fracture or dislocation seems unlikely.  Will also perform EKG and labs to evaluate for cause of her fall, this likely is from deconditioning and patient has a history of frequent falls, and currently uses a wheelchair.  8:48 AM labs ordered and independently interpreted by me with mild leukocytosis, creatinine of 1.36 which is slightly elevated for the patient as her baseline is around 1, most with troponin of 0.34 with normal EKG.  Repeat troponin  is ordered.  Radiographic studies are pending as is urinalysis.  8:55 AM x-ray independently interpreted by me with right hip replacement and left screws, no new fracture or dislocation.  9:16 AM CT scan of the head independently interpreted by me negative for acute intracranial findings  10:42 AM labs independently interpreted by me with no evidence for urinary infection on urinalysis, repeat troponin is stable and review of prior records shows that troponin is normal in the 30s to 40s.  Patient is stable for discharge.    At the conclusion of the encounter I discussed the results of all of the tests and the disposition. The questions were answered. The patient or family acknowledged understanding and was agreeable with the care plan.     Medical Decision Making    History:  Supplemental history from: EMS and Other: nursing  External Record(s) reviewed: Documented in chart, if applicable.    Work Up:  Chart documentation includes differential considered and any EKGs or imaging independently interpreted by provider, where specified.  In additional to work up documented, I considered the following work up: Documented in chart, if applicable.    External consultation:  Discussion of management with another provider: Documented in chart, if applicable    Complicating factors:  Care impacted by chronic illness: Dementia and Hypertension  Care affected by social determinants of health: Access to Affordable Health Care    Disposition considerations: Discharge. No recommendations on prescription strength medication(s). N/A.    EKG:    Performed at: 8:11 AM  Impression: No acute ischemic changes  Rate: 92  Rhythm: Sinus  Axis: Normal  FL Interval: 138  QRS Interval: 82  QTc Interval: 425  ST Changes: No acute ischemic changes  Comparison: October 2023 no changes    I have independently reviewed and interpreted the EKG(s) documented above.    PROCEDURES:       MEDICATIONS GIVEN IN THE EMERGENCY:  Medications - No data to  display    NEW PRESCRIPTIONS STARTED AT TODAY'S ER VISIT  New Prescriptions    No medications on file       =================================================================    HPI    Patient information was obtained from: EMS, primary nurse      Kim Ybarra is a 83 year old female with a pertinent history of dementia who presents to this ED for evaluation of fall.  Patient had an unwitnessed fall this morning, was last seen about 30 minutes prior to this.  Patient repeatedly says she has a right hip fracture but denies any pain anywhere now.      REVIEW OF SYSTEMS   Review of Systems   All other systems reviewed and negative    PAST MEDICAL HISTORY:  Past Medical History:   Diagnosis Date    Poor historian        PAST SURGICAL HISTORY:  Past Surgical History:   Procedure Laterality Date    CLOSED REDUCTION, PERCUTANEOUS PINNING HIP Left 5/20/2023    Procedure: Closed reduction, percutaneous pinning left hip;  Surgeon: Lilly Rey MD;  Location:  OR    OPEN REDUCTION INTERNAL FIXATION HIP BIPOLAR Right 10/5/2023    Procedure: HEMIARTHROPLASTY, HIP, BIPOLAR;  Surgeon: Feroz Oh MD;  Location: [facility] OR       CURRENT MEDICATIONS:    No current facility-administered medications for this encounter.     Current Outpatient Medications   Medication    acetaminophen (TYLENOL) 325 MG tablet    aspirin (ASA) 81 MG chewable tablet    glucosamine-chondroitin 500-400 MG CAPS per capsule    losartan (COZAAR) 50 MG tablet    multivitamin w/minerals (CERTAVITE/ANTIOXIDANTS) tablet    oxyCODONE (ROXICODONE) 5 MG tablet    senna-docusate (SENOKOT-S/PERICOLACE) 8.6-50 MG tablet    simvastatin (ZOCOR) 40 MG tablet       ALLERGIES:  Allergies   Allergen Reactions    Other Food Allergy Unknown     Beef       FAMILY HISTORY:  No family history on file.    SOCIAL HISTORY:   Social History     Socioeconomic History    Marital status: Single   Tobacco Use    Smoking status: Never    Smokeless tobacco: Never    Substance and Sexual Activity    Alcohol use: Not Currently    Drug use: Never       VITALS:  BP (!) 170/75   Pulse 88   Temp 98.2  F (36.8  C) (Oral)   Resp 26   LMP  (LMP Unknown)   SpO2 97%     PHYSICAL EXAM:  Physical Exam  Vitals and nursing note reviewed.   Constitutional:       Appearance: Normal appearance.   HENT:      Head: Normocephalic.      Comments: Left forehead hematoma     Right Ear: External ear normal.      Left Ear: External ear normal.      Nose: Nose normal.      Mouth/Throat:      Mouth: Mucous membranes are moist.   Eyes:      Extraocular Movements: Extraocular movements intact.      Conjunctiva/sclera: Conjunctivae normal.      Pupils: Pupils are equal, round, and reactive to light.   Cardiovascular:      Rate and Rhythm: Normal rate and regular rhythm.   Pulmonary:      Effort: Pulmonary effort is normal.      Breath sounds: Normal breath sounds. No wheezing or rales.   Abdominal:      General: Abdomen is flat. There is no distension.      Palpations: Abdomen is soft.      Tenderness: There is no abdominal tenderness. There is no guarding.   Musculoskeletal:         General: Normal range of motion.      Cervical back: Normal range of motion and neck supple.      Right lower leg: No edema.      Left lower leg: No edema.   Lymphadenopathy:      Cervical: No cervical adenopathy.   Skin:     General: Skin is warm and dry.   Neurological:      General: No focal deficit present.      Mental Status: She is alert. Mental status is at baseline.      Comments: No gross focal neurologic deficits   Psychiatric:         Mood and Affect: Mood normal.         Behavior: Behavior normal.         Thought Content: Thought content normal.          LAB:  All pertinent labs reviewed and interpreted.  Results for orders placed or performed during the hospital encounter of 12/28/23   Head CT w/o contrast    Impression    IMPRESSION:  1.  No CT evidence for acute intracranial process.  2.  Brain atrophy and  presumed chronic microvascular ischemic changes as above.   XR Femur Right 2 Views    Impression    IMPRESSION: Diffuse osseous demineralization. Cemented right hip hemiarthroplasty. No evidence of loosening. No definite fracture, however, evaluation is degraded due to degree of osseous demineralization.    Mild degenerative arthrosis of the right knee.   XR Pelvis 1/2 Views    Impression    IMPRESSION: Diffuse osseous demineralization. Cemented right hip hemiarthroplasty. No evidence of loosening. No definite fracture, however, evaluation is degraded due to degree of osseous demineralization.    Mild degenerative arthrosis of the right knee.   CT Cervical Spine w/o Contrast    Impression    IMPRESSION:  1.  No fracture or posttraumatic subluxation.   Basic metabolic panel   Result Value Ref Range    Sodium 138 135 - 145 mmol/L    Potassium 4.7 3.4 - 5.3 mmol/L    Chloride 104 98 - 107 mmol/L    Carbon Dioxide (CO2) 24 22 - 29 mmol/L    Anion Gap 10 7 - 15 mmol/L    Urea Nitrogen 27.3 (H) 8.0 - 23.0 mg/dL    Creatinine 1.36 (H) 0.51 - 0.95 mg/dL    GFR Estimate 39 (L) >60 mL/min/1.73m2    Calcium 10.3 (H) 8.8 - 10.2 mg/dL    Glucose 146 (H) 70 - 99 mg/dL   Result Value Ref Range    Troponin T, High Sensitivity 34 (H) <=14 ng/L   Result Value Ref Range    Magnesium 2.1 1.7 - 2.3 mg/dL   UA with Microscopic reflex to Culture    Specimen: Urine, Midstream   Result Value Ref Range    Color Urine Yellow Colorless, Straw, Light Yellow, Yellow    Appearance Urine Clear Clear    Glucose Urine Negative Negative mg/dL    Bilirubin Urine Negative Negative    Ketones Urine Negative Negative mg/dL    Specific Gravity Urine 1.030 1.001 - 1.030    Blood Urine Negative Negative    pH Urine 5.5 5.0 - 7.0    Protein Albumin Urine 70 (A) Negative mg/dL    Urobilinogen Urine <2.0 <2.0 mg/dL    Nitrite Urine Negative Negative    Leukocyte Esterase Urine Negative Negative    Mucus Urine Present (A) None Seen /LPF    RBC Urine 0 <=2 /HPF     WBC Urine 2 <=5 /HPF   CBC with platelets and differential   Result Value Ref Range    WBC Count 14.0 (H) 4.0 - 11.0 10e3/uL    RBC Count 4.33 3.80 - 5.20 10e6/uL    Hemoglobin 12.3 11.7 - 15.7 g/dL    Hematocrit 38.1 35.0 - 47.0 %    MCV 88 78 - 100 fL    MCH 28.4 26.5 - 33.0 pg    MCHC 32.3 31.5 - 36.5 g/dL    RDW 14.0 10.0 - 15.0 %    Platelet Count 213 150 - 450 10e3/uL    % Neutrophils 84 %    % Lymphocytes 5 %    % Monocytes 10 %    % Eosinophils 0 %    % Basophils 0 %    % Immature Granulocytes 1 %    NRBCs per 100 WBC 0 <1 /100    Absolute Neutrophils 11.8 (H) 1.6 - 8.3 10e3/uL    Absolute Lymphocytes 0.7 (L) 0.8 - 5.3 10e3/uL    Absolute Monocytes 1.4 (H) 0.0 - 1.3 10e3/uL    Absolute Eosinophils 0.0 0.0 - 0.7 10e3/uL    Absolute Basophils 0.0 0.0 - 0.2 10e3/uL    Absolute Immature Granulocytes 0.1 <=0.4 10e3/uL    Absolute NRBCs 0.0 10e3/uL   Result Value Ref Range    Troponin T, High Sensitivity 33 (H) <=14 ng/L       RADIOLOGY:  Reviewed all pertinent imaging. Please see official radiology report.  CT Cervical Spine w/o Contrast   Final Result   IMPRESSION:   1.  No fracture or posttraumatic subluxation.      Head CT w/o contrast   Final Result   IMPRESSION:   1.  No CT evidence for acute intracranial process.   2.  Brain atrophy and presumed chronic microvascular ischemic changes as above.      XR Pelvis 1/2 Views   Final Result   IMPRESSION: Diffuse osseous demineralization. Cemented right hip hemiarthroplasty. No evidence of loosening. No definite fracture, however, evaluation is degraded due to degree of osseous demineralization.      Mild degenerative arthrosis of the right knee.      XR Femur Right 2 Views   Final Result   IMPRESSION: Diffuse osseous demineralization. Cemented right hip hemiarthroplasty. No evidence of loosening. No definite fracture, however, evaluation is degraded due to degree of osseous demineralization.      Mild degenerative arthrosis of the right knee.        Bernabe RÍOS  Ernestine PUENTE  Emergency Medicine  [facility] EMERGENCY DEPARTMENT  [facility] [facility] [id number] [phone]  Dept: [phone]       Bernabe Sinha MD  12/28/23 1042

## 2023-12-28 NOTE — ED NOTES
Bed: JNED-06  Expected date:   Expected time:   Means of arrival:   Comments:  [facility], fall- hematoma to forehead, no thinners

## 2023-12-28 NOTE — ED TRIAGE NOTES
"Pt came via EMS from [facility]. Per EMS facility workers found pt on the floor. They checked on her 30 minutes prior to er fall. Pt not on blood thinners. Hypertensive. Did not take her morning meds. Pt BP for EMS was 199/100. 92% on RA. Pt had multiple previous falls. Pt has a hematoma on her forehead on the left side. Pt denies pain. Oriented to person, place and situation. Pt believes she \"broke my right hip\".      Triage Assessment (Adult)       Row Name 12/28/23 0755          Triage Assessment    Airway WDL WDL        Respiratory WDL    Respiratory WDL WDL        Skin Circulation/Temperature WDL    Skin Circulation/Temperature WDL WDL        Cardiac WDL    Cardiac WDL WDL        Peripheral/Neurovascular WDL    Peripheral Neurovascular WDL WDL        Cognitive/Neuro/Behavioral WDL    Cognitive/Neuro/Behavioral WDL X     Level of Consciousness confused     Orientation time        Pupils (CN II)    Pupil PERRLA yes     Pupil Size Left 2 mm     Pupil Size Right 2 mm                     "

## 2024-01-04 ENCOUNTER — DOCUMENTATION ONLY (OUTPATIENT)
Dept: OTHER | Facility: CLINIC | Age: 84
End: 2024-01-04
Payer: COMMERCIAL

## 2024-02-26 ENCOUNTER — LAB REQUISITION (OUTPATIENT)
Dept: LAB | Facility: CLINIC | Age: 84
End: 2024-02-26
Payer: COMMERCIAL

## 2024-02-26 DIAGNOSIS — I10 ESSENTIAL (PRIMARY) HYPERTENSION: ICD-10-CM

## 2024-02-26 DIAGNOSIS — E55.9 VITAMIN D DEFICIENCY, UNSPECIFIED: ICD-10-CM

## 2024-02-27 LAB
ERYTHROCYTE [DISTWIDTH] IN BLOOD BY AUTOMATED COUNT: 14 % (ref 10–15)
HCT VFR BLD AUTO: 35.5 % (ref 35–47)
HGB BLD-MCNC: 11.4 G/DL (ref 11.7–15.7)
MCH RBC QN AUTO: 28.8 PG (ref 26.5–33)
MCHC RBC AUTO-ENTMCNC: 32.1 G/DL (ref 31.5–36.5)
MCV RBC AUTO: 90 FL (ref 78–100)
PLATELET # BLD AUTO: 216 10E3/UL (ref 150–450)
RBC # BLD AUTO: 3.96 10E6/UL (ref 3.8–5.2)
WBC # BLD AUTO: 8.5 10E3/UL (ref 4–11)

## 2024-02-27 PROCEDURE — 82306 VITAMIN D 25 HYDROXY: CPT | Mod: ORL | Performed by: NURSE PRACTITIONER

## 2024-02-27 PROCEDURE — 36415 COLL VENOUS BLD VENIPUNCTURE: CPT | Mod: ORL | Performed by: NURSE PRACTITIONER

## 2024-02-27 PROCEDURE — 80053 COMPREHEN METABOLIC PANEL: CPT | Mod: ORL | Performed by: NURSE PRACTITIONER

## 2024-02-27 PROCEDURE — 85027 COMPLETE CBC AUTOMATED: CPT | Mod: ORL | Performed by: NURSE PRACTITIONER

## 2024-02-27 PROCEDURE — P9603 ONE-WAY ALLOW PRORATED MILES: HCPCS | Mod: ORL | Performed by: NURSE PRACTITIONER

## 2024-02-28 LAB
ALBUMIN SERPL BCG-MCNC: 3.7 G/DL (ref 3.5–5.2)
ALP SERPL-CCNC: 90 U/L (ref 40–150)
ALT SERPL W P-5'-P-CCNC: 10 U/L (ref 0–50)
ANION GAP SERPL CALCULATED.3IONS-SCNC: 10 MMOL/L (ref 7–15)
AST SERPL W P-5'-P-CCNC: 15 U/L (ref 0–45)
BILIRUB SERPL-MCNC: 0.2 MG/DL
BUN SERPL-MCNC: 25.7 MG/DL (ref 8–23)
CALCIUM SERPL-MCNC: 9.8 MG/DL (ref 8.8–10.2)
CHLORIDE SERPL-SCNC: 107 MMOL/L (ref 98–107)
CREAT SERPL-MCNC: 1.34 MG/DL (ref 0.51–0.95)
DEPRECATED HCO3 PLAS-SCNC: 25 MMOL/L (ref 22–29)
EGFRCR SERPLBLD CKD-EPI 2021: 39 ML/MIN/1.73M2
GLUCOSE SERPL-MCNC: 93 MG/DL (ref 70–99)
POTASSIUM SERPL-SCNC: 4.5 MMOL/L (ref 3.4–5.3)
PROT SERPL-MCNC: 6 G/DL (ref 6.4–8.3)
SODIUM SERPL-SCNC: 142 MMOL/L (ref 135–145)
VIT D+METAB SERPL-MCNC: 34 NG/ML (ref 20–50)

## (undated) DEVICE — RETRIVER SUTURE LASSO HOFFEE BLUE 710000

## (undated) DEVICE — DRAPE IOBAN ISOLATION VERTICAL 6619

## (undated) DEVICE — SU MONOCRYL 3-0 PS-2 18" UND Y497G

## (undated) DEVICE — PILLOW HIP ABDUCTION CONCAVE 1.9

## (undated) DEVICE — SU STRATAFIX PDS PLUS 1 CT-1 18" SXPP1A404

## (undated) DEVICE — DRAPE POUCH IRR 1016

## (undated) DEVICE — GOWN IMPERVIOUS SPECIALTY XLG/XLONG 32474

## (undated) DEVICE — DRAPE CONVERTORS U-DRAPE 60X72" 8476

## (undated) DEVICE — TUBING SUCTION MEDI-VAC 1/4"X20' N620A

## (undated) DEVICE — BASIN SET MAJOR

## (undated) DEVICE — DRAPE C-ARM W/STRAPS 42X72" 07-CA104

## (undated) DEVICE — SYR BULB IRRIG DOVER 60 ML LATEX FREE 67000

## (undated) DEVICE — SUCTION IRR SYSTEM W/O TIP INTERPULSE HANDPIECE 0210-100-000

## (undated) DEVICE — SPONGE LAP 18X18" X8435

## (undated) DEVICE — SUTURE VICRYL+ 1 27IN CT-1 UND VCP261H

## (undated) DEVICE — SUTURE VICRYL+ 0 27IN CT-1 UND VCP260H

## (undated) DEVICE — GLOVE BIOGEL PI MICRO SZ 7.5 48575

## (undated) DEVICE — BONE CEMENT MIXEVAC HI VAC W/CARTRIDGE 0306-563-000

## (undated) DEVICE — PREP CHLORAPREP 26ML TINTED HI-LITE ORANGE 930815

## (undated) DEVICE — CUSTOM PACK TOTAL HIP SOP5BTHHEA

## (undated) DEVICE — SU MONOCRYL 3-0 PS-2 18" UND MCP497G

## (undated) DEVICE — Device

## (undated) DEVICE — SOL NACL 0.9% INJ 1000ML BAG 2B1324X

## (undated) DEVICE — SOL WATER IRRIG 1000ML BOTTLE 2F7114

## (undated) DEVICE — GOWN XLG DISP 9545

## (undated) DEVICE — SUCTION MANIFOLD NEPTUNE 2 SYS 4 PORT 0702-020-000

## (undated) DEVICE — SUTURE VICRYL+ 2-0 27IN CT-1 UND VCP259H

## (undated) DEVICE — BRUSH FEMORAL CANAL NARROW 110033

## (undated) DEVICE — DRSG AQUACEL AG HYDROFIBER  3.5X10" 422605

## (undated) DEVICE — LINEN TOWEL PACK X5 5464

## (undated) DEVICE — DRAPE IOBAN INCISE 23X17" 6650EZ

## (undated) DEVICE — DRESSING MEPILEX BORDER POST-OP 4X10

## (undated) DEVICE — ESU GROUND PAD ADULT W/CORD E7507

## (undated) DEVICE — DRSG STERI STRIP 1/2X4" R1547

## (undated) DEVICE — SU ETHIBOND 2 V-37 4X30" MX69G

## (undated) DEVICE — PLATE GROUNDING ADULT W/CORD 9165L

## (undated) DEVICE — BONE CLEANING TIP INTERPULSE FEMORAL CANAL 0210-008-000

## (undated) DEVICE — DRSG PRIMAPORE 03 1/8X6" 66000318

## (undated) DEVICE — BLADE SAGITTAL WIDE (SO-618) 2108-118

## (undated) DEVICE — STRAP KNEE/BODY 31143004

## (undated) DEVICE — SU MONOCRYL 2-0 SH 27" UND Y417H

## (undated) DEVICE — DRAPE POUCH INSTRUMENT 1018

## (undated) DEVICE — HOLDER LIMB VELCRO OR 0814-1533

## (undated) DEVICE — GLOVE BIOGEL PI SZ 7.5 40875

## (undated) DEVICE — PAD HIP POSITIONING MCGUIRE 707-CPM

## (undated) DEVICE — BONE CLEANING TIP INTERPULSE  0210-010-000

## (undated) DEVICE — A3 SUPPLIES- SEE NURSING INFO PAGE

## (undated) DEVICE — SOL NACL 0.9% IRRIG 1000ML BOTTLE 2F7124

## (undated) RX ORDER — FENTANYL CITRATE 50 UG/ML
INJECTION, SOLUTION INTRAMUSCULAR; INTRAVENOUS
Status: DISPENSED
Start: 2023-10-05

## (undated) RX ORDER — ONDANSETRON 2 MG/ML
INJECTION INTRAMUSCULAR; INTRAVENOUS
Status: DISPENSED
Start: 2023-10-05

## (undated) RX ORDER — CEFAZOLIN SODIUM 1 G/3ML
INJECTION, POWDER, FOR SOLUTION INTRAMUSCULAR; INTRAVENOUS
Status: DISPENSED
Start: 2023-10-05

## (undated) RX ORDER — PROPOFOL 10 MG/ML
INJECTION, EMULSION INTRAVENOUS
Status: DISPENSED
Start: 2023-05-20

## (undated) RX ORDER — PROPOFOL 10 MG/ML
INJECTION, EMULSION INTRAVENOUS
Status: DISPENSED
Start: 2023-10-05

## (undated) RX ORDER — LIDOCAINE HYDROCHLORIDE 10 MG/ML
INJECTION, SOLUTION EPIDURAL; INFILTRATION; INTRACAUDAL; PERINEURAL
Status: DISPENSED
Start: 2023-10-05

## (undated) RX ORDER — DEXAMETHASONE SODIUM PHOSPHATE 10 MG/ML
INJECTION, SOLUTION INTRAMUSCULAR; INTRAVENOUS
Status: DISPENSED
Start: 2023-10-05

## (undated) RX ORDER — FENTANYL CITRATE 50 UG/ML
INJECTION, SOLUTION INTRAMUSCULAR; INTRAVENOUS
Status: DISPENSED
Start: 2023-05-20

## (undated) RX ORDER — DEXAMETHASONE SODIUM PHOSPHATE 4 MG/ML
INJECTION, SOLUTION INTRA-ARTICULAR; INTRALESIONAL; INTRAMUSCULAR; INTRAVENOUS; SOFT TISSUE
Status: DISPENSED
Start: 2023-05-20

## (undated) RX ORDER — ONDANSETRON 2 MG/ML
INJECTION INTRAMUSCULAR; INTRAVENOUS
Status: DISPENSED
Start: 2023-05-20

## (undated) RX ORDER — FENTANYL CITRATE-0.9 % NACL/PF 10 MCG/ML
PLASTIC BAG, INJECTION (ML) INTRAVENOUS
Status: DISPENSED
Start: 2023-05-20